# Patient Record
Sex: MALE | Race: BLACK OR AFRICAN AMERICAN | Employment: UNEMPLOYED | URBAN - METROPOLITAN AREA
[De-identification: names, ages, dates, MRNs, and addresses within clinical notes are randomized per-mention and may not be internally consistent; named-entity substitution may affect disease eponyms.]

---

## 2019-06-01 ENCOUNTER — HOSPITAL ENCOUNTER (EMERGENCY)
Age: 19
Discharge: HOME | End: 2019-06-01
Payer: MEDICAID

## 2019-06-01 VITALS
DIASTOLIC BLOOD PRESSURE: 66 MMHG | TEMPERATURE: 97.2 F | OXYGEN SATURATION: 100 % | WEIGHT: 130 LBS | HEIGHT: 70 IN | HEART RATE: 69 BPM | RESPIRATION RATE: 16 BRPM | BODY MASS INDEX: 18.61 KG/M2 | SYSTOLIC BLOOD PRESSURE: 104 MMHG

## 2019-06-01 DIAGNOSIS — R11.2 NAUSEA AND VOMITING, INTRACTABILITY OF VOMITING NOT SPECIFIED, UNSPECIFIED VOMITING TYPE: ICD-10-CM

## 2019-06-01 DIAGNOSIS — E86.0 DEHYDRATION: Primary | ICD-10-CM

## 2019-06-01 LAB
ALBUMIN SERPL-MCNC: 5.2 G/DL (ref 3.5–5.2)
ALP BLD-CCNC: 140 U/L (ref 40–129)
ALT SERPL-CCNC: 14 U/L (ref 0–40)
ANION GAP SERPL CALCULATED.3IONS-SCNC: 12 MMOL/L (ref 7–16)
AST SERPL-CCNC: 30 U/L (ref 0–39)
BACTERIA: ABNORMAL /HPF
BASOPHILS ABSOLUTE: 0.01 E9/L (ref 0–0.2)
BASOPHILS RELATIVE PERCENT: 0.1 % (ref 0–2)
BILIRUB SERPL-MCNC: 0.3 MG/DL (ref 0–1.2)
BILIRUBIN URINE: NEGATIVE
BLOOD, URINE: ABNORMAL
BUN BLDV-MCNC: 22 MG/DL (ref 6–20)
CALCIUM SERPL-MCNC: 10.1 MG/DL (ref 8.6–10.2)
CHLORIDE BLD-SCNC: 100 MMOL/L (ref 98–107)
CLARITY: CLEAR
CO2: 24 MMOL/L (ref 22–29)
COLOR: YELLOW
CREAT SERPL-MCNC: 1.2 MG/DL (ref 0.7–1.2)
EOSINOPHILS ABSOLUTE: 0 E9/L (ref 0.05–0.5)
EOSINOPHILS RELATIVE PERCENT: 0 % (ref 0–6)
GFR AFRICAN AMERICAN: >60
GFR NON-AFRICAN AMERICAN: >60 ML/MIN/1.73
GLUCOSE BLD-MCNC: 96 MG/DL (ref 74–99)
GLUCOSE URINE: NEGATIVE MG/DL
HCT VFR BLD CALC: 41.2 % (ref 37–54)
HEMOGLOBIN: 13.7 G/DL (ref 12.5–16.5)
IMMATURE GRANULOCYTES #: 0.05 E9/L
IMMATURE GRANULOCYTES %: 0.6 % (ref 0–5)
KETONES, URINE: 15 MG/DL
LACTIC ACID: 0.9 MMOL/L (ref 0.5–2.2)
LEUKOCYTE ESTERASE, URINE: NEGATIVE
LIPASE: 23 U/L (ref 13–60)
LYMPHOCYTES ABSOLUTE: 0.66 E9/L (ref 1.5–4)
LYMPHOCYTES RELATIVE PERCENT: 7.4 % (ref 20–42)
MCH RBC QN AUTO: 29.8 PG (ref 26–35)
MCHC RBC AUTO-ENTMCNC: 33.3 % (ref 32–34.5)
MCV RBC AUTO: 89.8 FL (ref 80–99.9)
MONOCYTES ABSOLUTE: 0.54 E9/L (ref 0.1–0.95)
MONOCYTES RELATIVE PERCENT: 6.1 % (ref 2–12)
NEUTROPHILS ABSOLUTE: 7.64 E9/L (ref 1.8–7.3)
NEUTROPHILS RELATIVE PERCENT: 85.8 % (ref 43–80)
NITRITE, URINE: NEGATIVE
PDW BLD-RTO: 13.3 FL (ref 11.5–15)
PH UA: 5.5 (ref 5–9)
PLATELET # BLD: 248 E9/L (ref 130–450)
PMV BLD AUTO: 10 FL (ref 7–12)
POTASSIUM SERPL-SCNC: 5.1 MMOL/L (ref 3.5–5)
PROTEIN UA: 30 MG/DL
RBC # BLD: 4.59 E12/L (ref 3.8–5.8)
RBC UA: ABNORMAL /HPF (ref 0–2)
SODIUM BLD-SCNC: 136 MMOL/L (ref 132–146)
SPECIFIC GRAVITY UA: 1.02 (ref 1–1.03)
TOTAL PROTEIN: 9.4 G/DL (ref 6.4–8.3)
UROBILINOGEN, URINE: 0.2 E.U./DL
WBC # BLD: 8.9 E9/L (ref 4.5–11.5)
WBC UA: ABNORMAL /HPF (ref 0–5)

## 2019-06-01 PROCEDURE — 6360000002 HC RX W HCPCS: Performed by: NURSE PRACTITIONER

## 2019-06-01 PROCEDURE — 2580000003 HC RX 258: Performed by: NURSE PRACTITIONER

## 2019-06-01 PROCEDURE — 96374 THER/PROPH/DIAG INJ IV PUSH: CPT

## 2019-06-01 PROCEDURE — 81001 URINALYSIS AUTO W/SCOPE: CPT

## 2019-06-01 PROCEDURE — 99283 EMERGENCY DEPT VISIT LOW MDM: CPT

## 2019-06-01 PROCEDURE — 83690 ASSAY OF LIPASE: CPT

## 2019-06-01 PROCEDURE — 83605 ASSAY OF LACTIC ACID: CPT

## 2019-06-01 PROCEDURE — 36415 COLL VENOUS BLD VENIPUNCTURE: CPT

## 2019-06-01 PROCEDURE — 85025 COMPLETE CBC W/AUTO DIFF WBC: CPT

## 2019-06-01 PROCEDURE — 80053 COMPREHEN METABOLIC PANEL: CPT

## 2019-06-01 RX ORDER — ONDANSETRON 2 MG/ML
4 INJECTION INTRAMUSCULAR; INTRAVENOUS ONCE
Status: COMPLETED | OUTPATIENT
Start: 2019-06-01 | End: 2019-06-01

## 2019-06-01 RX ORDER — ONDANSETRON 4 MG/1
4 TABLET, ORALLY DISINTEGRATING ORAL EVERY 8 HOURS PRN
Qty: 24 TABLET | Refills: 0 | Status: SHIPPED | OUTPATIENT
Start: 2019-06-01 | End: 2020-06-29

## 2019-06-01 RX ORDER — 0.9 % SODIUM CHLORIDE 0.9 %
1000 INTRAVENOUS SOLUTION INTRAVENOUS ONCE
Status: COMPLETED | OUTPATIENT
Start: 2019-06-01 | End: 2019-06-01

## 2019-06-01 RX ADMIN — ONDANSETRON HYDROCHLORIDE 4 MG: 2 SOLUTION INTRAMUSCULAR; INTRAVENOUS at 02:51

## 2019-06-01 RX ADMIN — SODIUM CHLORIDE 1000 ML: 9 INJECTION, SOLUTION INTRAVENOUS at 02:50

## 2019-06-01 SDOH — HEALTH STABILITY: MENTAL HEALTH: HOW OFTEN DO YOU HAVE A DRINK CONTAINING ALCOHOL?: NEVER

## 2019-06-01 ASSESSMENT — PAIN DESCRIPTION - ORIENTATION
ORIENTATION: MID;UPPER
ORIENTATION: MID;UPPER

## 2019-06-01 ASSESSMENT — PAIN DESCRIPTION - PROGRESSION
CLINICAL_PROGRESSION: NOT CHANGED
CLINICAL_PROGRESSION: RESOLVED

## 2019-06-01 ASSESSMENT — PAIN SCALES - GENERAL
PAINLEVEL_OUTOF10: 7
PAINLEVEL_OUTOF10: 7

## 2019-06-01 ASSESSMENT — PAIN DESCRIPTION - LOCATION
LOCATION: ABDOMEN
LOCATION: ABDOMEN

## 2019-06-01 ASSESSMENT — PAIN DESCRIPTION - PAIN TYPE
TYPE: ACUTE PAIN
TYPE: ACUTE PAIN

## 2019-06-01 ASSESSMENT — PAIN DESCRIPTION - DESCRIPTORS
DESCRIPTORS: SHARP
DESCRIPTORS: STABBING

## 2019-06-01 ASSESSMENT — PAIN DESCRIPTION - FREQUENCY
FREQUENCY: INTERMITTENT
FREQUENCY: INTERMITTENT

## 2019-06-01 ASSESSMENT — PAIN DESCRIPTION - ONSET: ONSET: ON-GOING

## 2019-06-01 NOTE — ED NOTES
Patient remains A&OX4, respirations non-labored, skin warm/dry, no distress noted. Patient discharged per order.       Johnathon Joaquin RN  06/01/19 8854

## 2019-06-04 NOTE — ED PROVIDER NOTES
[facility]   HPI:  6/4/19, Time: 12:30 AM         Nunu Henson is a 22 y.o. male presenting to the ED for nausea and vomiting after playing basketball. Patient reports having midepigastric abdominal pain as well as emesis ×5 this evening. Patient reports that he attempted to eat and drink while playing basketball but afterwards started having emesis. Denies any other family members ill with symptoms. Denies any diarrhea. Denies any back pain with urination. Review of Systems:   Pertinent positives and negatives are stated within HPI, all other systems reviewed and are negative.          --------------------------------------------- PAST HISTORY ---------------------------------------------  Past Medical History:  has no past medical history on file. Past Surgical History:  has no past surgical history on file. Social History:  reports that he has never smoked. He has never used smokeless tobacco. He reports that he has current or past drug history. Drug: Marijuana. He reports that he does not drink alcohol. Family History: family history is not on file. The patients home medications have been reviewed. Allergies: Patient has no known allergies.     -------------------------------------------------- RESULTS -------------------------------------------------  All laboratory and radiology results have been personally reviewed by myself   LABS:  Results for orders placed or performed during the hospital encounter of 06/01/19   CBC Auto Differential   Result Value Ref Range    WBC 8.9 4.5 - 11.5 E9/L    RBC 4.59 3.80 - 5.80 E12/L    Hemoglobin 13.7 12.5 - 16.5 g/dL    Hematocrit 41.2 37.0 - 54.0 %    MCV 89.8 80.0 - 99.9 fL    MCH 29.8 26.0 - 35.0 pg    MCHC 33.3 32.0 - 34.5 %    RDW 13.3 11.5 - 15.0 fL    Platelets 417 509 - 179 E9/L    MPV 10.0 7.0 - 12.0 fL    Neutrophils % 85.8 (H) 43.0 - 80.0 %    Immature Granulocytes % 0.6 0.0 - 5.0 %    Lymphocytes % 7.4 (L) 20.0 - 42.0 %    Monocytes % 6.1 2.0 - 12.0 %    Eosinophils % 0.0 0.0 - 6.0 %    Basophils % 0.1 0.0 - 2.0 %    Neutrophils # 7.64 (H) 1.80 - 7.30 E9/L    Immature Granulocytes # 0.05 E9/L    Lymphocytes # 0.66 (L) 1.50 - 4.00 E9/L    Monocytes # 0.54 0.10 - 0.95 E9/L    Eosinophils # 0.00 (L) 0.05 - 0.50 E9/L    Basophils # 0.01 0.00 - 0.20 E9/L   Comprehensive Metabolic Panel   Result Value Ref Range    Sodium 136 132 - 146 mmol/L    Potassium 5.1 (H) 3.5 - 5.0 mmol/L    Chloride 100 98 - 107 mmol/L    CO2 24 22 - 29 mmol/L    Anion Gap 12 7 - 16 mmol/L    Glucose 96 74 - 99 mg/dL    BUN 22 (H) 6 - 20 mg/dL    CREATININE 1.2 0.7 - 1.2 mg/dL    GFR Non-African American >60 >=60 mL/min/1.73    GFR African American >60     Calcium 10.1 8.6 - 10.2 mg/dL    Total Protein 9.4 (H) 6.4 - 8.3 g/dL    Alb 5.2 3.5 - 5.2 g/dL    Total Bilirubin 0.3 0.0 - 1.2 mg/dL    Alkaline Phosphatase 140 (H) 40 - 129 U/L    ALT 14 0 - 40 U/L    AST 30 0 - 39 U/L   Lactic Acid, Plasma   Result Value Ref Range    Lactic Acid 0.9 0.5 - 2.2 mmol/L   Lipase   Result Value Ref Range    Lipase 23 13 - 60 U/L   Urinalysis   Result Value Ref Range    Color, UA Yellow Straw/Yellow    Clarity, UA Clear Clear    Glucose, Ur Negative Negative mg/dL    Bilirubin Urine Negative Negative    Ketones, Urine 15 (A) Negative mg/dL    Specific Gravity, UA 1.025 1.005 - 1.030    Blood, Urine SMALL (A) Negative    pH, UA 5.5 5.0 - 9.0    Protein, UA 30 (A) Negative mg/dL    Urobilinogen, Urine 0.2 <2.0 E.U./dL    Nitrite, Urine Negative Negative    Leukocyte Esterase, Urine Negative Negative   Microscopic Urinalysis   Result Value Ref Range    WBC, UA 2-5 0 - 5 /HPF    RBC, UA 0-1 0 - 2 /HPF    Bacteria, UA FEW (A) /HPF       RADIOLOGY:  Interpreted by Radiologist.  No orders to display       ------------------------- NURSING NOTES AND VITALS REVIEWED ---------------------------   The nursing notes within the ED encounter and vital signs as below have been reviewed.    /66   Pulse 69 Temp 97.2 °F (36.2 °C) (Infrared)   Resp 16   Ht 5' 10\" (1.778 m)   Wt 130 lb (59 kg)   SpO2 100%   BMI 18.65 kg/m²   Oxygen Saturation Interpretation: Normal      ---------------------------------------------------PHYSICAL EXAM--------------------------------------      Constitutional/General: Alert and oriented x3, well appearing, non toxic in NAD  Head: Normocephalic and atraumatic  Eyes: PERRL, EOMI  Mouth: Oropharynx clear, handling secretions, no trismus  Neck: Supple, full ROM,   Pulmonary: Lungs clear to auscultation bilaterally, no wheezes, rales, or rhonchi. Not in respiratory distress  Cardiovascular:  Regular rate and rhythm, no murmurs, gallops, or rubs. 2+ distal pulses  Abdomen: Soft, non tender, non distended,   Extremities: Moves all extremities x 4. Warm and well perfused  Skin: warm and dry without rash  Neurologic: GCS 15,  Psych: Normal Affect      ------------------------------ ED COURSE/MEDICAL DECISION MAKING----------------------  Medications   0.9 % sodium chloride bolus (0 mLs Intravenous Stopped 6/1/19 3723)   ondansetron (ZOFRAN) injection 4 mg (4 mg Intravenous Given 6/1/19 2911)         ED COURSE:       Medical Decision Making: Plan will be for labs. Will also medicate with 1 L normal saline and IV Zofran. Patient neurovascularly intact. Labs resulted in are unremarkable. Patient upon reevaluation after 1 L normal saline. Patient reports feeling markedly improved. Plan will be further discharge home with prescription for Zofran he was educated on slowly advancing his diet and to start with clear liquids. Patient neurovascularly intact. Patient expressed understanding and safely discharged home       Counseling: The emergency provider has spoken with the patient and discussed todays results, in addition to providing specific details for the plan of care and counseling regarding the diagnosis and prognosis.   Questions are answered at this time and they are agreeable with the plan.      --------------------------------- IMPRESSION AND DISPOSITION ---------------------------------    IMPRESSION  1. Nausea and vomiting, intractability of vomiting not specified, unspecified vomiting type    2. Dehydration        DISPOSITION  Disposition: Discharge to home  Patient condition is good      NOTE: This report was transcribed using voice recognition software.  Every effort was made to ensure accuracy; however, inadvertent computerized transcription errors may be present     Perri Taveras, DARWIN - [facility]  06/04/19 3567 BERTA Lorenz, DARWIN - CNP  06/04/19 1225

## 2019-10-23 ENCOUNTER — HOSPITAL ENCOUNTER (EMERGENCY)
Age: 19
Discharge: HOME | End: 2019-10-23
Payer: MEDICAID

## 2019-10-23 ENCOUNTER — APPOINTMENT (OUTPATIENT)
Dept: GENERAL RADIOLOGY | Age: 19
End: 2019-10-23
Payer: MEDICAID

## 2019-10-23 VITALS
SYSTOLIC BLOOD PRESSURE: 117 MMHG | WEIGHT: 165 LBS | BODY MASS INDEX: 23.1 KG/M2 | RESPIRATION RATE: 16 BRPM | HEIGHT: 71 IN | TEMPERATURE: 98.3 F | DIASTOLIC BLOOD PRESSURE: 77 MMHG | HEART RATE: 70 BPM | OXYGEN SATURATION: 99 %

## 2019-10-23 DIAGNOSIS — J06.9 VIRAL URI WITH COUGH: Primary | ICD-10-CM

## 2019-10-23 PROCEDURE — 99283 EMERGENCY DEPT VISIT LOW MDM: CPT

## 2019-10-23 PROCEDURE — 71046 X-RAY EXAM CHEST 2 VIEWS: CPT

## 2019-10-23 RX ORDER — GUAIFENESIN/DEXTROMETHORPHAN 100-10MG/5
5 SYRUP ORAL 3 TIMES DAILY PRN
Qty: 120 ML | Refills: 0 | Status: SHIPPED | OUTPATIENT
Start: 2019-10-23 | End: 2019-11-02

## 2019-10-23 RX ORDER — METHYLPREDNISOLONE 4 MG/1
TABLET ORAL
Qty: 1 KIT | Refills: 0 | Status: SHIPPED | OUTPATIENT
Start: 2019-10-23 | End: 2019-10-29

## 2019-10-23 ASSESSMENT — PAIN DESCRIPTION - PAIN TYPE: TYPE: ACUTE PAIN

## 2019-10-23 ASSESSMENT — PAIN DESCRIPTION - LOCATION: LOCATION: CHEST

## 2019-10-23 ASSESSMENT — PAIN SCALES - GENERAL: PAINLEVEL_OUTOF10: 3

## 2019-10-23 ASSESSMENT — PAIN DESCRIPTION - DESCRIPTORS: DESCRIPTORS: BURNING

## 2019-10-23 ASSESSMENT — PAIN SCALES - WONG BAKER: WONGBAKER_NUMERICALRESPONSE: 2

## 2020-06-29 ENCOUNTER — HOSPITAL ENCOUNTER (EMERGENCY)
Age: 20
Discharge: HOME | End: 2020-06-29
Payer: MEDICAID

## 2020-06-29 ENCOUNTER — APPOINTMENT (OUTPATIENT)
Dept: CT IMAGING | Age: 20
End: 2020-06-29
Payer: MEDICAID

## 2020-06-29 VITALS
HEIGHT: 71 IN | TEMPERATURE: 97.9 F | RESPIRATION RATE: 14 BRPM | SYSTOLIC BLOOD PRESSURE: 109 MMHG | BODY MASS INDEX: 18.2 KG/M2 | HEART RATE: 68 BPM | OXYGEN SATURATION: 100 % | WEIGHT: 130 LBS | DIASTOLIC BLOOD PRESSURE: 78 MMHG

## 2020-06-29 LAB
ALBUMIN SERPL-MCNC: 4.6 G/DL (ref 3.5–5.2)
ALP BLD-CCNC: 73 U/L (ref 40–129)
ALT SERPL-CCNC: 10 U/L (ref 0–40)
ANION GAP SERPL CALCULATED.3IONS-SCNC: 10 MMOL/L (ref 7–16)
AST SERPL-CCNC: 18 U/L (ref 0–39)
BASOPHILS ABSOLUTE: 0.01 E9/L (ref 0–0.2)
BASOPHILS RELATIVE PERCENT: 0.5 % (ref 0–2)
BILIRUB SERPL-MCNC: 0.4 MG/DL (ref 0–1.2)
BILIRUBIN URINE: NEGATIVE
BLOOD, URINE: NEGATIVE
BUN BLDV-MCNC: 9 MG/DL (ref 6–20)
CALCIUM SERPL-MCNC: 9.6 MG/DL (ref 8.6–10.2)
CHLORIDE BLD-SCNC: 104 MMOL/L (ref 98–107)
CLARITY: CLEAR
CO2: 25 MMOL/L (ref 22–29)
COLOR: YELLOW
CREAT SERPL-MCNC: 0.8 MG/DL (ref 0.7–1.2)
EOSINOPHILS ABSOLUTE: 0.05 E9/L (ref 0.05–0.5)
EOSINOPHILS RELATIVE PERCENT: 2.3 % (ref 0–6)
GFR AFRICAN AMERICAN: >60
GFR NON-AFRICAN AMERICAN: >60 ML/MIN/1.73
GLUCOSE BLD-MCNC: 108 MG/DL (ref 74–99)
GLUCOSE URINE: NEGATIVE MG/DL
HCT VFR BLD CALC: 38.8 % (ref 37–54)
HEMOGLOBIN: 12.9 G/DL (ref 12.5–16.5)
IMMATURE GRANULOCYTES #: 0.03 E9/L
IMMATURE GRANULOCYTES %: 1.4 % (ref 0–5)
KETONES, URINE: NEGATIVE MG/DL
LACTIC ACID: 0.6 MMOL/L (ref 0.5–2.2)
LEUKOCYTE ESTERASE, URINE: NEGATIVE
LIPASE: 36 U/L (ref 13–60)
LYMPHOCYTES ABSOLUTE: 0.73 E9/L (ref 1.5–4)
LYMPHOCYTES RELATIVE PERCENT: 33.6 % (ref 20–42)
MCH RBC QN AUTO: 30.9 PG (ref 26–35)
MCHC RBC AUTO-ENTMCNC: 33.2 % (ref 32–34.5)
MCV RBC AUTO: 92.8 FL (ref 80–99.9)
MONOCYTES ABSOLUTE: 0.17 E9/L (ref 0.1–0.95)
MONOCYTES RELATIVE PERCENT: 7.8 % (ref 2–12)
NEUTROPHILS ABSOLUTE: 1.18 E9/L (ref 1.8–7.3)
NEUTROPHILS RELATIVE PERCENT: 54.4 % (ref 43–80)
NITRITE, URINE: NEGATIVE
PDW BLD-RTO: 14.1 FL (ref 11.5–15)
PH UA: 6 (ref 5–9)
PLATELET # BLD: 163 E9/L (ref 130–450)
PMV BLD AUTO: 10 FL (ref 7–12)
POTASSIUM REFLEX MAGNESIUM: 4.1 MMOL/L (ref 3.5–5)
PROTEIN UA: NEGATIVE MG/DL
RBC # BLD: 4.18 E12/L (ref 3.8–5.8)
SODIUM BLD-SCNC: 139 MMOL/L (ref 132–146)
SPECIFIC GRAVITY UA: 1.01 (ref 1–1.03)
TOTAL PROTEIN: 7.5 G/DL (ref 6.4–8.3)
UROBILINOGEN, URINE: 1 E.U./DL
WBC # BLD: 2.2 E9/L (ref 4.5–11.5)

## 2020-06-29 PROCEDURE — 83605 ASSAY OF LACTIC ACID: CPT

## 2020-06-29 PROCEDURE — 80053 COMPREHEN METABOLIC PANEL: CPT

## 2020-06-29 PROCEDURE — 96374 THER/PROPH/DIAG INJ IV PUSH: CPT

## 2020-06-29 PROCEDURE — 99284 EMERGENCY DEPT VISIT MOD MDM: CPT

## 2020-06-29 PROCEDURE — 6360000002 HC RX W HCPCS: Performed by: NURSE PRACTITIONER

## 2020-06-29 PROCEDURE — 85025 COMPLETE CBC W/AUTO DIFF WBC: CPT

## 2020-06-29 PROCEDURE — 2580000003 HC RX 258: Performed by: NURSE PRACTITIONER

## 2020-06-29 PROCEDURE — 74177 CT ABD & PELVIS W/CONTRAST: CPT

## 2020-06-29 PROCEDURE — 83690 ASSAY OF LIPASE: CPT

## 2020-06-29 PROCEDURE — 6360000004 HC RX CONTRAST MEDICATION: Performed by: RADIOLOGY

## 2020-06-29 PROCEDURE — 81003 URINALYSIS AUTO W/O SCOPE: CPT

## 2020-06-29 PROCEDURE — 96375 TX/PRO/DX INJ NEW DRUG ADDON: CPT

## 2020-06-29 RX ORDER — CYANOCOBALAMIN (VITAMIN B-12) 500 MCG
500 TABLET ORAL DAILY
COMMUNITY
End: 2021-06-01

## 2020-06-29 RX ORDER — DOCUSATE SODIUM 100 MG/1
100 CAPSULE, LIQUID FILLED ORAL 2 TIMES DAILY
Qty: 14 CAPSULE | Refills: 0 | Status: SHIPPED | OUTPATIENT
Start: 2020-06-29 | End: 2020-07-06

## 2020-06-29 RX ORDER — KETOROLAC TROMETHAMINE 30 MG/ML
15 INJECTION, SOLUTION INTRAMUSCULAR; INTRAVENOUS ONCE
Status: COMPLETED | OUTPATIENT
Start: 2020-06-29 | End: 2020-06-29

## 2020-06-29 RX ORDER — 0.9 % SODIUM CHLORIDE 0.9 %
1000 INTRAVENOUS SOLUTION INTRAVENOUS ONCE
Status: COMPLETED | OUTPATIENT
Start: 2020-06-29 | End: 2020-06-29

## 2020-06-29 RX ORDER — POLYETHYLENE GLYCOL 3350 17 G/17G
17 POWDER, FOR SOLUTION ORAL DAILY
Qty: 510 G | Refills: 0 | Status: SHIPPED | OUTPATIENT
Start: 2020-06-29 | End: 2020-07-29

## 2020-06-29 RX ORDER — ONDANSETRON 2 MG/ML
4 INJECTION INTRAMUSCULAR; INTRAVENOUS ONCE
Status: COMPLETED | OUTPATIENT
Start: 2020-06-29 | End: 2020-06-29

## 2020-06-29 RX ADMIN — KETOROLAC TROMETHAMINE 15 MG: 30 INJECTION, SOLUTION INTRAMUSCULAR at 10:29

## 2020-06-29 RX ADMIN — SODIUM CHLORIDE 1000 ML: 9 INJECTION, SOLUTION INTRAVENOUS at 10:27

## 2020-06-29 RX ADMIN — IOPAMIDOL 110 ML: 755 INJECTION, SOLUTION INTRAVENOUS at 11:59

## 2020-06-29 RX ADMIN — ONDANSETRON 4 MG: 2 INJECTION INTRAMUSCULAR; INTRAVENOUS at 10:27

## 2020-06-29 ASSESSMENT — PAIN SCALES - GENERAL
PAINLEVEL_OUTOF10: 5
PAINLEVEL_OUTOF10: 0

## 2020-06-29 ASSESSMENT — PAIN DESCRIPTION - ORIENTATION: ORIENTATION: UPPER

## 2020-06-29 ASSESSMENT — PAIN DESCRIPTION - LOCATION: LOCATION: ABDOMEN

## 2020-06-29 ASSESSMENT — PAIN DESCRIPTION - PAIN TYPE: TYPE: ACUTE PAIN

## 2020-06-29 ASSESSMENT — PAIN DESCRIPTION - DESCRIPTORS: DESCRIPTORS: SHARP

## 2020-06-29 NOTE — ED PROVIDER NOTES
[facility]       Department of Emergency Medicine   ED  Provider Note  Admit Date/RoomTime: 6/29/2020  9:26 AM  ED Room: 14/14B-14  Chief Complaint     Abdominal Pain (pt woke up with mid epigastric abdominal pain)    History of Present Illness   Source of history provided by:  patient. History/Exam Limitations: none. Hallie Manriquez is a 20 y.o. old male who has a past medical history of: History reviewed. No pertinent past medical history. presents to the emergency department for complaint of abdominal pain that started when he woke up this morning. There has been no similar episodes in the past.  Since onset the symptoms have been persistent. The pain is associated with nothing pertinent. The pain is aggravated by none and relieved by nothing. There has been NO back pain, chills, cloudy urine, diarrhea, dysuria, headache, hematuria, penile discharge, sweating, urinary frequency, urinary incontinence, urinary urgency or vomiting. Denies fever or chills. No scrotal pain or swelling. Reports that his last bowel movement was 3 days ago. Reports his normal bowel pattern is every other day. Denies melena or hematochezia. admits to the use of alcohol on the weekends only. Denies illicit drug use. .  ROS   Pertinent positives and negatives are stated within HPI, all other systems reviewed and are negative. History reviewed. No pertinent surgical history. Social History:  reports that he has never smoked. He has never used smokeless tobacco. He reports current drug use. Drug: Marijuana. He reports that he does not drink alcohol. Family History: family history is not on file. Allergies: Patient has no known allergies.     Physical Exam           ED Triage Vitals   BP Temp Temp src Pulse Resp SpO2 Height Weight   06/29/20 0924 06/29/20 0923 -- 06/29/20 0913 06/29/20 0923 06/29/20 0913 06/29/20 0923 06/29/20 0923   136/60 97.9 °F (36.6 °C)  71 18 97 % 5' 11\" (1.803 m) 130 lb (59 kg)      Oxygen Saturation Interpretation: Normal.    · General Appearance/Constitutional:  Alert, development consistent with age. · HEENT:  NC/NT. PERRLA. Airway patent. · Neck:  Supple. No lymphadenopathy. · Respiratory: Lungs Clear to auscultation and breath sounds equal.  · CV:  Regular rate and rhythm. · GI:  General Appearance: normal.         Bowel sounds: hypoactive bowel sounds. Distension:  None. Tenderness: mild tenderness is present in the periumbilical area. Liver: non-tender. Spleen:  non-tender. Pulsatile Mass: absent. Hernia:  no inguinal or femoral hernias noted. · Back: CVA Tenderness: No.  · Integument:  Normal turgor. Warm, dry, without visible rash, unless noted elsewhere. · Neurological:  Orientation age-appropriate. Motor functions intact.     Lab / Imaging Results   (All laboratory and radiology results have been personally reviewed by myself)  Labs:  Results for orders placed or performed during the hospital encounter of 06/29/20   CBC Auto Differential   Result Value Ref Range    WBC 2.2 (L) 4.5 - 11.5 E9/L    RBC 4.18 3.80 - 5.80 E12/L    Hemoglobin 12.9 12.5 - 16.5 g/dL    Hematocrit 38.8 37.0 - 54.0 %    MCV 92.8 80.0 - 99.9 fL    MCH 30.9 26.0 - 35.0 pg    MCHC 33.2 32.0 - 34.5 %    RDW 14.1 11.5 - 15.0 fL    Platelets 901 564 - 635 E9/L    MPV 10.0 7.0 - 12.0 fL    Neutrophils % 54.4 43.0 - 80.0 %    Immature Granulocytes % 1.4 0.0 - 5.0 %    Lymphocytes % 33.6 20.0 - 42.0 %    Monocytes % 7.8 2.0 - 12.0 %    Eosinophils % 2.3 0.0 - 6.0 %    Basophils % 0.5 0.0 - 2.0 %    Neutrophils Absolute 1.18 (L) 1.80 - 7.30 E9/L    Immature Granulocytes # 0.03 E9/L    Lymphocytes Absolute 0.73 (L) 1.50 - 4.00 E9/L    Monocytes Absolute 0.17 0.10 - 0.95 E9/L    Eosinophils Absolute 0.05 0.05 - 0.50 E9/L    Basophils Absolute 0.01 0.00 - 0.20 E9/L   Comprehensive Metabolic Panel w/ Reflex to MG   Result Value Ref Range    Sodium 139 132 - 146 mmol/L    Potassium reflex Magnesium 4.1 3.5 - 5.0 mmol/L    Chloride 104 98 - 107 mmol/L    CO2 25 22 - 29 mmol/L    Anion Gap 10 7 - 16 mmol/L    Glucose 108 (H) 74 - 99 mg/dL    BUN 9 6 - 20 mg/dL    CREATININE 0.8 0.7 - 1.2 mg/dL    GFR Non-African American >60 >=60 mL/min/1.73    GFR African American >60     Calcium 9.6 8.6 - 10.2 mg/dL    Total Protein 7.5 6.4 - 8.3 g/dL    Alb 4.6 3.5 - 5.2 g/dL    Total Bilirubin 0.4 0.0 - 1.2 mg/dL    Alkaline Phosphatase 73 40 - 129 U/L    ALT 10 0 - 40 U/L    AST 18 0 - 39 U/L   Lactic Acid, Plasma   Result Value Ref Range    Lactic Acid 0.6 0.5 - 2.2 mmol/L   Lipase   Result Value Ref Range    Lipase 36 13 - 60 U/L   Urinalysis, reflex to microscopic   Result Value Ref Range    Color, UA Yellow Straw/Yellow    Clarity, UA Clear Clear    Glucose, Ur Negative Negative mg/dL    Bilirubin Urine Negative Negative    Ketones, Urine Negative Negative mg/dL    Specific Gravity, UA 1.015 1.005 - 1.030    Blood, Urine Negative Negative    pH, UA 6.0 5.0 - 9.0    Protein, UA Negative Negative mg/dL    Urobilinogen, Urine 1.0 <2.0 E.U./dL    Nitrite, Urine Negative Negative    Leukocyte Esterase, Urine Negative Negative     Imaging: All Radiology results interpreted by Radiologist unless otherwise noted. CT ABDOMEN PELVIS W IV CONTRAST Additional Contrast? None   Final Result   Mild cardiomegaly. ED Course / Medical Decision Making     Medications   0.9 % sodium chloride bolus (0 mLs Intravenous Stopped 6/29/20 1130)   ondansetron (ZOFRAN) injection 4 mg (4 mg Intravenous Given 6/29/20 1027)   ketorolac (TORADOL) injection 15 mg (15 mg Intravenous Given 6/29/20 1029)   iopamidol (ISOVUE-370) 76 % injection 110 mL (110 mLs Intravenous Given 6/29/20 1159)        Re-Evaluations:  6/29/20       Patients symptoms are improving.     Consultations:             None    Procedures:   none    MDM:  Willem Castro is a 79-year-old male who presented to the emergency department for complaint of abdominal pain that started this morning when he awakened. Pain is localized in the periumbilical region. No nausea or vomiting. No fever or chills. No diarrhea. he reported no BM x 3 days. CBC: wbc 2.2, H&H 12.9/38. 8. MP unremarkable. Lactic acid 0.6. Lipase 36. UA negative for any concerns for UTI. CT abdomen showed mild cardiomegaly, no acute abdominal findings. He had no complaints of chest pain or shortness of breath. Specifically the appendix was found to be normal. The gallbladder and bile ducts were unremarkable. No evidence of free air or bowel obstruction. Prescribed Colace and MiraLAX for complaint of constipation. Results of his CT, which included the incidental finding of mild cardiomegaly was discussed with him in addition to his low white blood cell count. He was instructed to follow up with his family doctor for repeat blood work and possible need for further evaluation. He verbalized understanding and agrees with plan. He was given IV fluids and medicated for pain with positive results. He remained hemodynamically stable, non-toxic, and appropriate for outpatient management. He was advised to return for any new, changing, worsening symptoms or concerns. At this time the patient is without objective evidence of an acute process requiring hospitalization or inpatient management. They have remained hemodynamically stable throughout their entire ED visit and are stable for discharge with outpatient follow-up. The plan has been discussed in detail and they are aware of the specific conditions for emergent return, as well as the importance of follow-up. Counseling:   I have spoken with the patient and discussed todays results, in addition to providing specific details for the plan of care and counseling regarding the diagnosis and prognosis and are agreeable with the plan. Assessment      1. Abdominal pain, unspecified abdominal location    2. Leukopenia, unspecified type    3. Constipation, unspecified constipation type      This patient's ED course included: a personal history and physicial examination and re-evaluation prior to disposition  This patient has remained hemodynamically stable and improved during their ED course. Plan   Discharge to home. Patient condition is good. New Medications     Discharge Medication List as of 6/29/2020  2:04 PM      START taking these medications    Details   docusate sodium (COLACE) 100 MG capsule Take 1 capsule by mouth 2 times daily for 7 days, Disp-14 capsule, R-0Print      polyethylene glycol (MIRALAX) 17 GM/SCOOP powder Take 17 g by mouth daily, Disp-510 g, R-0Print           Electronically signed by DARWIN Christianson CNP   DD: 6/29/20  **This report was transcribed using voice recognition software. Every effort was made to ensure accuracy; however, inadvertent computerized transcription errors may be present.   END OF PROVIDER NOTE      DARWIN Christianson CNP  06/29/20 2013

## 2020-08-05 ENCOUNTER — APPOINTMENT (OUTPATIENT)
Dept: GENERAL RADIOLOGY | Age: 20
End: 2020-08-05
Payer: MEDICAID

## 2020-08-05 ENCOUNTER — HOSPITAL ENCOUNTER (EMERGENCY)
Age: 20
Discharge: HOME | End: 2020-08-05
Attending: EMERGENCY MEDICINE
Payer: MEDICAID

## 2020-08-05 VITALS
WEIGHT: 146 LBS | TEMPERATURE: 97.3 F | SYSTOLIC BLOOD PRESSURE: 116 MMHG | OXYGEN SATURATION: 99 % | DIASTOLIC BLOOD PRESSURE: 78 MMHG | HEIGHT: 71 IN | BODY MASS INDEX: 20.44 KG/M2 | HEART RATE: 68 BPM | RESPIRATION RATE: 16 BRPM

## 2020-08-05 LAB
ALBUMIN SERPL-MCNC: 4.6 G/DL (ref 3.5–5.2)
ALP BLD-CCNC: 71 U/L (ref 40–129)
ALT SERPL-CCNC: 11 U/L (ref 0–40)
ANION GAP SERPL CALCULATED.3IONS-SCNC: 14 MMOL/L (ref 7–16)
AST SERPL-CCNC: 24 U/L (ref 0–39)
BACTERIA: ABNORMAL /HPF
BASOPHILS ABSOLUTE: 0.01 E9/L (ref 0–0.2)
BASOPHILS RELATIVE PERCENT: 0.4 % (ref 0–2)
BILIRUB SERPL-MCNC: 0.4 MG/DL (ref 0–1.2)
BILIRUBIN URINE: NEGATIVE
BLOOD, URINE: ABNORMAL
BUN BLDV-MCNC: 10 MG/DL (ref 6–20)
CALCIUM SERPL-MCNC: 10 MG/DL (ref 8.6–10.2)
CHLORIDE BLD-SCNC: 100 MMOL/L (ref 98–107)
CLARITY: CLEAR
CO2: 22 MMOL/L (ref 22–29)
COLOR: YELLOW
CREAT SERPL-MCNC: 0.8 MG/DL (ref 0.7–1.2)
EKG ATRIAL RATE: 59 BPM
EKG P AXIS: 5 DEGREES
EKG P-R INTERVAL: 200 MS
EKG Q-T INTERVAL: 438 MS
EKG QRS DURATION: 124 MS
EKG QTC CALCULATION (BAZETT): 433 MS
EKG R AXIS: 97 DEGREES
EKG T AXIS: 61 DEGREES
EKG VENTRICULAR RATE: 59 BPM
EOSINOPHILS ABSOLUTE: 0.08 E9/L (ref 0.05–0.5)
EOSINOPHILS RELATIVE PERCENT: 3.4 % (ref 0–6)
EPITHELIAL CELLS, UA: ABNORMAL /HPF
GFR AFRICAN AMERICAN: >60
GFR NON-AFRICAN AMERICAN: >60 ML/MIN/1.73
GLUCOSE BLD-MCNC: 81 MG/DL (ref 74–99)
GLUCOSE URINE: NEGATIVE MG/DL
HCT VFR BLD CALC: 40.4 % (ref 37–54)
HEMOGLOBIN: 13.6 G/DL (ref 12.5–16.5)
IMMATURE GRANULOCYTES #: 0.01 E9/L
IMMATURE GRANULOCYTES %: 0.4 % (ref 0–5)
KETONES, URINE: 15 MG/DL
LACTIC ACID: 0.8 MMOL/L (ref 0.5–2.2)
LEUKOCYTE ESTERASE, URINE: NEGATIVE
LIPASE: 32 U/L (ref 13–60)
LYMPHOCYTES ABSOLUTE: 0.78 E9/L (ref 1.5–4)
LYMPHOCYTES RELATIVE PERCENT: 33.2 % (ref 20–42)
MCH RBC QN AUTO: 31.4 PG (ref 26–35)
MCHC RBC AUTO-ENTMCNC: 33.7 % (ref 32–34.5)
MCV RBC AUTO: 93.3 FL (ref 80–99.9)
MONOCYTES ABSOLUTE: 0.23 E9/L (ref 0.1–0.95)
MONOCYTES RELATIVE PERCENT: 9.8 % (ref 2–12)
NEUTROPHILS ABSOLUTE: 1.24 E9/L (ref 1.8–7.3)
NEUTROPHILS RELATIVE PERCENT: 52.8 % (ref 43–80)
NITRITE, URINE: NEGATIVE
PDW BLD-RTO: 13.6 FL (ref 11.5–15)
PH UA: 6 (ref 5–9)
PLATELET # BLD: 148 E9/L (ref 130–450)
PMV BLD AUTO: 9.9 FL (ref 7–12)
POTASSIUM SERPL-SCNC: 4.9 MMOL/L (ref 3.5–5)
PROTEIN UA: NEGATIVE MG/DL
RBC # BLD: 4.33 E12/L (ref 3.8–5.8)
RBC UA: ABNORMAL /HPF (ref 0–2)
SODIUM BLD-SCNC: 136 MMOL/L (ref 132–146)
SPECIFIC GRAVITY UA: 1.02 (ref 1–1.03)
TOTAL PROTEIN: 7.5 G/DL (ref 6.4–8.3)
TROPONIN: <0.01 NG/ML (ref 0–0.03)
UROBILINOGEN, URINE: 0.2 E.U./DL
WBC # BLD: 2.4 E9/L (ref 4.5–11.5)
WBC UA: ABNORMAL /HPF (ref 0–5)

## 2020-08-05 PROCEDURE — 99284 EMERGENCY DEPT VISIT MOD MDM: CPT

## 2020-08-05 PROCEDURE — 84484 ASSAY OF TROPONIN QUANT: CPT

## 2020-08-05 PROCEDURE — 6370000000 HC RX 637 (ALT 250 FOR IP): Performed by: EMERGENCY MEDICINE

## 2020-08-05 PROCEDURE — 96372 THER/PROPH/DIAG INJ SC/IM: CPT

## 2020-08-05 PROCEDURE — 83605 ASSAY OF LACTIC ACID: CPT

## 2020-08-05 PROCEDURE — 93005 ELECTROCARDIOGRAM TRACING: CPT | Performed by: EMERGENCY MEDICINE

## 2020-08-05 PROCEDURE — 81001 URINALYSIS AUTO W/SCOPE: CPT

## 2020-08-05 PROCEDURE — 96374 THER/PROPH/DIAG INJ IV PUSH: CPT

## 2020-08-05 PROCEDURE — 6360000002 HC RX W HCPCS: Performed by: EMERGENCY MEDICINE

## 2020-08-05 PROCEDURE — 83690 ASSAY OF LIPASE: CPT

## 2020-08-05 PROCEDURE — 2580000003 HC RX 258: Performed by: EMERGENCY MEDICINE

## 2020-08-05 PROCEDURE — 74022 RADEX COMPL AQT ABD SERIES: CPT

## 2020-08-05 PROCEDURE — C9113 INJ PANTOPRAZOLE SODIUM, VIA: HCPCS | Performed by: EMERGENCY MEDICINE

## 2020-08-05 PROCEDURE — 80053 COMPREHEN METABOLIC PANEL: CPT

## 2020-08-05 PROCEDURE — 85025 COMPLETE CBC W/AUTO DIFF WBC: CPT

## 2020-08-05 PROCEDURE — 36415 COLL VENOUS BLD VENIPUNCTURE: CPT

## 2020-08-05 RX ORDER — PANTOPRAZOLE SODIUM 40 MG/1
40 TABLET, DELAYED RELEASE ORAL DAILY
Qty: 30 TABLET | Refills: 0 | Status: SHIPPED | OUTPATIENT
Start: 2020-08-05 | End: 2021-06-01

## 2020-08-05 RX ORDER — PANTOPRAZOLE SODIUM 40 MG/10ML
40 INJECTION, POWDER, LYOPHILIZED, FOR SOLUTION INTRAVENOUS ONCE
Status: COMPLETED | OUTPATIENT
Start: 2020-08-05 | End: 2020-08-05

## 2020-08-05 RX ORDER — SODIUM CHLORIDE 9 MG/ML
INJECTION, SOLUTION INTRAVENOUS CONTINUOUS
Status: DISCONTINUED | OUTPATIENT
Start: 2020-08-05 | End: 2020-08-05 | Stop reason: HOSPADM

## 2020-08-05 RX ADMIN — PANTOPRAZOLE SODIUM 40 MG: 40 INJECTION, POWDER, FOR SOLUTION INTRAVENOUS at 05:46

## 2020-08-05 RX ADMIN — LIDOCAINE HYDROCHLORIDE: 20 SOLUTION ORAL; TOPICAL at 06:15

## 2020-08-05 ASSESSMENT — PAIN DESCRIPTION - PROGRESSION
CLINICAL_PROGRESSION: RESOLVED
CLINICAL_PROGRESSION: RESOLVED

## 2020-08-05 ASSESSMENT — PAIN DESCRIPTION - DESCRIPTORS: DESCRIPTORS: DISCOMFORT

## 2020-08-05 ASSESSMENT — PAIN DESCRIPTION - PAIN TYPE: TYPE: ACUTE PAIN

## 2020-08-05 ASSESSMENT — PAIN DESCRIPTION - ORIENTATION: ORIENTATION: MID

## 2020-08-05 ASSESSMENT — PAIN DESCRIPTION - LOCATION: LOCATION: ABDOMEN

## 2020-08-05 NOTE — ED PROVIDER NOTES
HPI:  8/5/20, Time: 4:50 AM EDT         Corey Briseno is a 20 y.o. male presenting to the ED for abdominal pain, beginning hours ago. The complaint has been persistent, moderate in severity, and worsened by nothing. Patient reporting epigastric pain that started a short time prior to arrival.  Patient reporting no vomiting or diarrhea. He reports no black or tarry stools. He reports the pain is causing him to be short of breath. Patient reporting no leg pain or swelling there is no cough. There is no fever or chills. Patient reporting no weakness or dizziness. He reports no neck or back pain. ROS:   Pertinent positives and negatives are stated within HPI, all other systems reviewed and are negative.  --------------------------------------------- PAST HISTORY ---------------------------------------------  Past Medical History:  has no past medical history on file. Past Surgical History:  has no past surgical history on file. Social History:  reports that he has been smoking cigarettes. He has been smoking about 0.25 packs per day. He has never used smokeless tobacco. He reports current drug use. Drug: Marijuana. He reports that he does not drink alcohol. Family History: family history is not on file. The patients home medications have been reviewed. Allergies: Patient has no known allergies. ---------------------------------------------------PHYSICAL EXAM--------------------------------------    Constitutional/General: Alert and oriented x3, mild distress  Head: Normocephalic and atraumatic  Eyes: PERRL, EOMI  Mouth: Oropharynx clear, handling secretions, no trismus  Neck: Supple, full ROM, non tender to palpation in the midline, no stridor, no crepitus, no meningeal signs  Pulmonary: Lungs clear to auscultation bilaterally, no wheezes, rales, or rhonchi. Not in respiratory distress  Cardiovascular:  Regular rate. Regular rhythm. No murmurs, gallops, or rubs.  2+ distal pulses  Chest: no chest wall tenderness  Abdomen: Soft. Tender mid upper abdomen non distended. +BS. No rebound, guarding, or rigidity. No pulsatile masses appreciated. Musculoskeletal: Moves all extremities x 4. Warm and well perfused, no clubbing, cyanosis, or edema. Capillary refill <3 seconds  Skin: warm and dry. No rashes. Neurologic: GCS 15, CN 2-12 grossly intact, no focal deficits, symmetric strength 5/5 in the upper and lower extremities bilaterally  Psych: Normal Affect    -------------------------------------------------- RESULTS -------------------------------------------------  I have personally reviewed all laboratory and imaging results for this patient. Results are listed below.      LABS:  Results for orders placed or performed during the hospital encounter of 08/05/20   CBC auto differential   Result Value Ref Range    WBC 2.4 (L) 4.5 - 11.5 E9/L    RBC 4.33 3.80 - 5.80 E12/L    Hemoglobin 13.6 12.5 - 16.5 g/dL    Hematocrit 40.4 37.0 - 54.0 %    MCV 93.3 80.0 - 99.9 fL    MCH 31.4 26.0 - 35.0 pg    MCHC 33.7 32.0 - 34.5 %    RDW 13.6 11.5 - 15.0 fL    Platelets 809 373 - 780 E9/L    MPV 9.9 7.0 - 12.0 fL    Neutrophils % 52.8 43.0 - 80.0 %    Immature Granulocytes % 0.4 0.0 - 5.0 %    Lymphocytes % 33.2 20.0 - 42.0 %    Monocytes % 9.8 2.0 - 12.0 %    Eosinophils % 3.4 0.0 - 6.0 %    Basophils % 0.4 0.0 - 2.0 %    Neutrophils Absolute 1.24 (L) 1.80 - 7.30 E9/L    Immature Granulocytes # 0.01 E9/L    Lymphocytes Absolute 0.78 (L) 1.50 - 4.00 E9/L    Monocytes Absolute 0.23 0.10 - 0.95 E9/L    Eosinophils Absolute 0.08 0.05 - 0.50 E9/L    Basophils Absolute 0.01 0.00 - 0.20 E9/L   Comprehensive Metabolic Panel   Result Value Ref Range    Sodium 136 132 - 146 mmol/L    Potassium 4.9 3.5 - 5.0 mmol/L    Chloride 100 98 - 107 mmol/L    CO2 22 22 - 29 mmol/L    Anion Gap 14 7 - 16 mmol/L    Glucose 81 74 - 99 mg/dL    BUN 10 6 - 20 mg/dL    CREATININE 0.8 0.7 - 1.2 mg/dL    GFR Non-African American >60 >=60 mL/min/1.73    GFR African American >60     Calcium 10.0 8.6 - 10.2 mg/dL    Total Protein 7.5 6.4 - 8.3 g/dL    Alb 4.6 3.5 - 5.2 g/dL    Total Bilirubin 0.4 0.0 - 1.2 mg/dL    Alkaline Phosphatase 71 40 - 129 U/L    ALT 11 0 - 40 U/L    AST 24 0 - 39 U/L   Troponin   Result Value Ref Range    Troponin <0.01 0.00 - 0.03 ng/mL   Lactic Acid, Plasma   Result Value Ref Range    Lactic Acid 0.8 0.5 - 2.2 mmol/L   Lipase   Result Value Ref Range    Lipase 32 13 - 60 U/L       RADIOLOGY:  Interpreted by Radiologist.  XR ACUTE ABD SERIES CHEST 1 VW   Final Result   No significant abnormal findings. EKG:  This EKG is signed and interpreted by me. Rate: 59  Rhythm: Sinus  Interpretation: Early repolarization  Comparison: no previous EKG available        ------------------------- NURSING NOTES AND VITALS REVIEWED ---------------------------   The nursing notes within the ED encounter and vital signs as below have been reviewed by myself. /73   Pulse 73   Temp 97.6 °F (36.4 °C)   Resp 16   Ht 5' 11\" (1.803 m)   Wt 146 lb (66.2 kg)   SpO2 99%   BMI 20.36 kg/m²   Oxygen Saturation Interpretation: Normal    The patients available past medical records and past encounters were reviewed. ------------------------------ ED COURSE/MEDICAL DECISION MAKING----------------------  Medications   0.9 % sodium chloride infusion ( Intravenous Not Given 8/5/20 9803)   pantoprazole (PROTONIX) injection 40 mg (40 mg Intravenous Given 8/5/20 3803)   aluminum & magnesium hydroxide-simethicone (MAALOX) 30 mL, lidocaine viscous hcl (XYLOCAINE) 5 mL (GI COCKTAIL) ( Oral Given 8/5/20 1807)             Medical Decision Making:        Re-Evaluations:  Patient rechecked and medicated. Patient significantly improved after being given Protonix. Patient made aware of findings and plan. Patient comfortable with being discharged home. He does report he has an appoint with hematologist in about a week.   Patient does know about his leukopenia. He reports no fever he reports no chills he reports no chest pain. Patient CT from prior evaluation at the end of June noted and reviewed  Patient's abdomen is soft there is no rebound no guarding he longer has abdominal pain in his epigastric region. Patient told to return anytime for any problems. Consultations:                 Critical Care: This patient's ED course included: a personal history and physicial eaxmination    This patient has been closely monitored during their ED course. Counseling: The emergency provider has spoken with the patient and discussed todays results, in addition to providing specific details for the plan of care and counseling regarding the diagnosis and prognosis. Questions are answered at this time and they are agreeable with the plan.       --------------------------------- IMPRESSION AND DISPOSITION ---------------------------------    IMPRESSION  1. Pain of upper abdomen    2. Leukopenia, unspecified type        DISPOSITION  Disposition: Discharge to home  Patient condition is stable        NOTE: This report was transcribed using voice recognition software.  Every effort was made to ensure accuracy; however, inadvertent computerized transcription errors may be present          Iglesia Colvin MD  08/05/20 [address] MD Bro  08/05/20 [address] [facility], MD  08/05/20 0162

## 2020-08-05 NOTE — ED NOTES
Bed: 18B-18  Expected date:   Expected time:   Means of arrival:   Comments:  Mendel Ronde, RN  08/05/20 1160

## 2020-08-05 NOTE — ED NOTES
Patient A&OX4, respirations non-labored, skin warm/dry, no distress noted. Patient discharged per order.       Emiliano Esqueda RN  08/05/20 8466

## 2021-06-01 ENCOUNTER — APPOINTMENT (OUTPATIENT)
Dept: GENERAL RADIOLOGY | Age: 21
DRG: 342 | End: 2021-06-01
Payer: MEDICAID

## 2021-06-01 ENCOUNTER — HOSPITAL ENCOUNTER (INPATIENT)
Age: 21
LOS: 3 days | Discharge: HOME | DRG: 342 | End: 2021-06-04
Attending: EMERGENCY MEDICINE | Admitting: INTERNAL MEDICINE
Payer: MEDICAID

## 2021-06-01 ENCOUNTER — APPOINTMENT (OUTPATIENT)
Dept: CT IMAGING | Age: 21
DRG: 342 | End: 2021-06-01
Payer: MEDICAID
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
## 2021-06-01 DIAGNOSIS — J98.4 CAVITARY LESION OF LUNG: ICD-10-CM

## 2021-06-01 DIAGNOSIS — S52.501A CLOSED FRACTURE OF DISTAL END OF RIGHT RADIUS, UNSPECIFIED FRACTURE MORPHOLOGY, INITIAL ENCOUNTER: ICD-10-CM

## 2021-06-01 LAB
ALBUMIN SERPL-MCNC: 4.9 G/DL (ref 3.5–5.2)
ALP BLD-CCNC: 85 U/L (ref 40–129)
ALT SERPL-CCNC: 156 U/L (ref 0–40)
ANION GAP SERPL CALCULATED.3IONS-SCNC: 16 MMOL/L (ref 7–16)
AST SERPL-CCNC: 206 U/L (ref 0–39)
BASOPHILS ABSOLUTE: 0.01 E9/L (ref 0–0.2)
BASOPHILS RELATIVE PERCENT: 0.1 % (ref 0–2)
BILIRUB SERPL-MCNC: 0.2 MG/DL (ref 0–1.2)
BUN BLDV-MCNC: 7 MG/DL (ref 6–20)
CALCIUM SERPL-MCNC: 9.8 MG/DL (ref 8.6–10.2)
CHLORIDE BLD-SCNC: 102 MMOL/L (ref 98–107)
CO2: 22 MMOL/L (ref 22–29)
CREAT SERPL-MCNC: 0.7 MG/DL (ref 0.7–1.2)
EOSINOPHILS ABSOLUTE: 0 E9/L (ref 0.05–0.5)
EOSINOPHILS RELATIVE PERCENT: 0 % (ref 0–6)
GFR AFRICAN AMERICAN: >60
GFR NON-AFRICAN AMERICAN: >60 ML/MIN/1.73
GLUCOSE BLD-MCNC: 93 MG/DL (ref 74–99)
HCT VFR BLD CALC: 38.9 % (ref 37–54)
HEMOGLOBIN: 13.2 G/DL (ref 12.5–16.5)
IMMATURE GRANULOCYTES #: 0.05 E9/L
IMMATURE GRANULOCYTES %: 0.6 % (ref 0–5)
LACTIC ACID, SEPSIS: 1.9 MMOL/L (ref 0.5–1.9)
LYMPHOCYTES ABSOLUTE: 0.68 E9/L (ref 1.5–4)
LYMPHOCYTES RELATIVE PERCENT: 8.5 % (ref 20–42)
MCH RBC QN AUTO: 31 PG (ref 26–35)
MCHC RBC AUTO-ENTMCNC: 33.9 % (ref 32–34.5)
MCV RBC AUTO: 91.3 FL (ref 80–99.9)
MONOCYTES ABSOLUTE: 0.31 E9/L (ref 0.1–0.95)
MONOCYTES RELATIVE PERCENT: 3.9 % (ref 2–12)
NEUTROPHILS ABSOLUTE: 6.94 E9/L (ref 1.8–7.3)
NEUTROPHILS RELATIVE PERCENT: 86.9 % (ref 43–80)
PDW BLD-RTO: 13.5 FL (ref 11.5–15)
PLATELET # BLD: 210 E9/L (ref 130–450)
PMV BLD AUTO: 9.5 FL (ref 7–12)
POTASSIUM REFLEX MAGNESIUM: 4 MMOL/L (ref 3.5–5)
RBC # BLD: 4.26 E12/L (ref 3.8–5.8)
SARS-COV-2, NAAT: NOT DETECTED
SODIUM BLD-SCNC: 140 MMOL/L (ref 132–146)
TOTAL PROTEIN: 8.3 G/DL (ref 6.4–8.3)
WBC # BLD: 8 E9/L (ref 4.5–11.5)

## 2021-06-01 PROCEDURE — 85025 COMPLETE CBC W/AUTO DIFF WBC: CPT

## 2021-06-01 PROCEDURE — 73070 X-RAY EXAM OF ELBOW: CPT

## 2021-06-01 PROCEDURE — 70450 CT HEAD/BRAIN W/O DYE: CPT

## 2021-06-01 PROCEDURE — 87040 BLOOD CULTURE FOR BACTERIA: CPT

## 2021-06-01 PROCEDURE — 1200000000 HC SEMI PRIVATE

## 2021-06-01 PROCEDURE — 87635 SARS-COV-2 COVID-19 AMP PRB: CPT

## 2021-06-01 PROCEDURE — 96374 THER/PROPH/DIAG INJ IV PUSH: CPT

## 2021-06-01 PROCEDURE — 6370000000 HC RX 637 (ALT 250 FOR IP): Performed by: NURSE PRACTITIONER

## 2021-06-01 PROCEDURE — 99284 EMERGENCY DEPT VISIT MOD MDM: CPT

## 2021-06-01 PROCEDURE — 80053 COMPREHEN METABOLIC PANEL: CPT

## 2021-06-01 PROCEDURE — 71250 CT THORAX DX C-: CPT

## 2021-06-01 PROCEDURE — 70486 CT MAXILLOFACIAL W/O DYE: CPT

## 2021-06-01 PROCEDURE — 2580000003 HC RX 258: Performed by: NURSE PRACTITIONER

## 2021-06-01 PROCEDURE — 99223 1ST HOSP IP/OBS HIGH 75: CPT | Performed by: INTERNAL MEDICINE

## 2021-06-01 PROCEDURE — 73110 X-RAY EXAM OF WRIST: CPT

## 2021-06-01 PROCEDURE — 6360000002 HC RX W HCPCS: Performed by: STUDENT IN AN ORGANIZED HEALTH CARE EDUCATION/TRAINING PROGRAM

## 2021-06-01 PROCEDURE — 73030 X-RAY EXAM OF SHOULDER: CPT

## 2021-06-01 PROCEDURE — 29125 APPL SHORT ARM SPLINT STATIC: CPT

## 2021-06-01 PROCEDURE — 83605 ASSAY OF LACTIC ACID: CPT

## 2021-06-01 PROCEDURE — 72125 CT NECK SPINE W/O DYE: CPT

## 2021-06-01 RX ORDER — FENTANYL CITRATE 50 UG/ML
50 INJECTION, SOLUTION INTRAMUSCULAR; INTRAVENOUS ONCE
Status: DISCONTINUED | OUTPATIENT
Start: 2021-06-01 | End: 2021-06-01

## 2021-06-01 RX ORDER — IBUPROFEN 400 MG/1
400 TABLET ORAL EVERY 6 HOURS PRN
Status: DISCONTINUED | OUTPATIENT
Start: 2021-06-01 | End: 2021-06-04 | Stop reason: HOSPADM

## 2021-06-01 RX ORDER — TRAMADOL HYDROCHLORIDE 50 MG/1
50 TABLET ORAL EVERY 6 HOURS PRN
Status: DISCONTINUED | OUTPATIENT
Start: 2021-06-01 | End: 2021-06-04 | Stop reason: HOSPADM

## 2021-06-01 RX ORDER — FENTANYL CITRATE 50 UG/ML
50 INJECTION, SOLUTION INTRAMUSCULAR; INTRAVENOUS ONCE
Status: COMPLETED | OUTPATIENT
Start: 2021-06-01 | End: 2021-06-01

## 2021-06-01 RX ORDER — TRAMADOL HYDROCHLORIDE 50 MG/1
100 TABLET ORAL EVERY 6 HOURS PRN
Status: DISCONTINUED | OUTPATIENT
Start: 2021-06-01 | End: 2021-06-04 | Stop reason: HOSPADM

## 2021-06-01 RX ORDER — PROMETHAZINE HYDROCHLORIDE 25 MG/1
12.5 TABLET ORAL EVERY 6 HOURS PRN
Status: DISCONTINUED | OUTPATIENT
Start: 2021-06-01 | End: 2021-06-04 | Stop reason: HOSPADM

## 2021-06-01 RX ORDER — POLYETHYLENE GLYCOL 3350 17 G/17G
17 POWDER, FOR SOLUTION ORAL DAILY PRN
Status: DISCONTINUED | OUTPATIENT
Start: 2021-06-01 | End: 2021-06-04 | Stop reason: HOSPADM

## 2021-06-01 RX ORDER — ACETAMINOPHEN 325 MG/1
650 TABLET ORAL EVERY 6 HOURS PRN
Status: DISCONTINUED | OUTPATIENT
Start: 2021-06-01 | End: 2021-06-01

## 2021-06-01 RX ORDER — ONDANSETRON 2 MG/ML
4 INJECTION INTRAMUSCULAR; INTRAVENOUS EVERY 6 HOURS PRN
Status: DISCONTINUED | OUTPATIENT
Start: 2021-06-01 | End: 2021-06-04 | Stop reason: HOSPADM

## 2021-06-01 RX ORDER — SODIUM CHLORIDE 0.9 % (FLUSH) 0.9 %
5-40 SYRINGE (ML) INJECTION PRN
Status: DISCONTINUED | OUTPATIENT
Start: 2021-06-01 | End: 2021-06-04 | Stop reason: HOSPADM

## 2021-06-01 RX ORDER — ACETAMINOPHEN 650 MG/1
650 SUPPOSITORY RECTAL EVERY 6 HOURS PRN
Status: DISCONTINUED | OUTPATIENT
Start: 2021-06-01 | End: 2021-06-01

## 2021-06-01 RX ORDER — SODIUM CHLORIDE 9 MG/ML
25 INJECTION, SOLUTION INTRAVENOUS PRN
Status: DISCONTINUED | OUTPATIENT
Start: 2021-06-01 | End: 2021-06-04 | Stop reason: HOSPADM

## 2021-06-01 RX ORDER — SODIUM CHLORIDE 0.9 % (FLUSH) 0.9 %
5-40 SYRINGE (ML) INJECTION EVERY 12 HOURS SCHEDULED
Status: DISCONTINUED | OUTPATIENT
Start: 2021-06-01 | End: 2021-06-04 | Stop reason: HOSPADM

## 2021-06-01 RX ADMIN — IBUPROFEN 400 MG: 400 TABLET, FILM COATED ORAL at 20:19

## 2021-06-01 RX ADMIN — Medication 10 ML: at 20:19

## 2021-06-01 RX ADMIN — TRAMADOL HYDROCHLORIDE 100 MG: 50 TABLET, FILM COATED ORAL at 17:38

## 2021-06-01 RX ADMIN — FENTANYL CITRATE 50 MCG: 50 INJECTION, SOLUTION INTRAMUSCULAR; INTRAVENOUS at 11:27

## 2021-06-01 ASSESSMENT — PAIN DESCRIPTION - LOCATION
LOCATION: WRIST
LOCATION: WRIST

## 2021-06-01 ASSESSMENT — ENCOUNTER SYMPTOMS
COUGH: 0
DIARRHEA: 0
BACK PAIN: 0
SHORTNESS OF BREATH: 0
NAUSEA: 0
CHEST TIGHTNESS: 0
PHOTOPHOBIA: 0
ABDOMINAL DISTENTION: 0
VOMITING: 0
ABDOMINAL PAIN: 0

## 2021-06-01 ASSESSMENT — PAIN DESCRIPTION - ONSET: ONSET: ON-GOING

## 2021-06-01 ASSESSMENT — PAIN SCALES - GENERAL
PAINLEVEL_OUTOF10: 6
PAINLEVEL_OUTOF10: 3
PAINLEVEL_OUTOF10: 10
PAINLEVEL_OUTOF10: 4
PAINLEVEL_OUTOF10: 3
PAINLEVEL_OUTOF10: 0
PAINLEVEL_OUTOF10: 10

## 2021-06-01 ASSESSMENT — PAIN DESCRIPTION - PROGRESSION: CLINICAL_PROGRESSION: NOT CHANGED

## 2021-06-01 ASSESSMENT — PAIN DESCRIPTION - DESCRIPTORS
DESCRIPTORS: ACHING;THROBBING
DESCRIPTORS: SORE

## 2021-06-01 ASSESSMENT — PAIN DESCRIPTION - ORIENTATION
ORIENTATION: RIGHT
ORIENTATION: RIGHT

## 2021-06-01 ASSESSMENT — PAIN DESCRIPTION - FREQUENCY: FREQUENCY: CONTINUOUS

## 2021-06-01 ASSESSMENT — PAIN DESCRIPTION - PAIN TYPE: TYPE: ACUTE PAIN
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
## 2021-06-01 NOTE — H&P
Admission:      Prior to Admission medications    Not on File       Allergies:  Patient has no known allergies. Social History:    RESIDENCE: Private residence  TOBACCO:   reports that he has been smoking cigarettes. He has been smoking about 0.25 packs per day. He has never used smokeless tobacco.  ETOH:   reports no history of alcohol use. Family History:    As follows:  History reviewed. No pertinent family history. REVIEW OF SYSTEMS:   Pertinent positives as noted in the HPI. All other systems reviewed and negative. PHYSICAL EXAM:  /65   Pulse 73   Temp 98.1 °F (36.7 °C)   Resp 20   Ht 5' 10\" (1.778 m)   Wt 140 lb (63.5 kg)   SpO2 98%   BMI 20.09 kg/m²   General appearance: No apparent distress, appears stated age and cooperative. HEENT: Normal cephalic, atraumatic without obvious deformity. Pupils equal, round, and reactive to light. Neck: Supple, with full range of motion. No jugular venous distention. Trachea midline. Respiratory:  Clear to auscultation bilaterally. No apparent distress on room air. Cardiovascular:  Regular rate and rhythm. S1, S2 without murmurs, rubs, or gallops. PV: Brisk capillary refill. +2 pedal and radial pulses bilaterally. No edema noted. Abdomen: Soft, non-tender, non-distended. +BS  Musculoskeletal: Right forearm splint in place, fingers warm + brisk cap refill, +movement and sensation  Skin: Normal skin color. No rashes appreciated. Neurologic:  Neurovascularly intact without any focal sensory/motor deficits.   Psychiatric: Alert and oriented, thought content appropriate, normal insight    Reviewed EKG and CXR personally      CBC:   Recent Labs     06/01/21  1132   WBC 8.0   RBC 4.26   HGB 13.2   HCT 38.9   MCV 91.3   RDW 13.5        BMP:   Recent Labs     06/01/21  1132      K 4.0      CO2 22   BUN 7   CREATININE 0.7     LFT:  Recent Labs     06/01/21  1132   PROT 8.3   ALKPHOS 85   *   *   BILITOT 0.2 CE:  No results for input(s): Srinivas Killings in the last 72 hours. PT/INR: No results for input(s): INR, APTT in the last 72 hours. BNP: No results for input(s): BNP in the last 72 hours. ESR: No results found for: SEDRATE  CRP: No results found for: CRP  D Dimer: No results found for: DDIMER   Folate and B12: No results found for: IYRMAEAO77, No results found for: FOLATE  Lactic Acid:   Lab Results   Component Value Date    LACTA 0.8 08/05/2020     Thyroid Studies: No results found for: TSH, N8BZQCD, A8KPYZE, THYROIDAB    Oupatient labs:  No results found for: CHOL, TRIG, HDL, LDLCALC, TSH, PSA, INR, LABA1C    Urinalysis:    Lab Results   Component Value Date    NITRU Negative 08/05/2020    WBCUA 0-1 08/05/2020    BACTERIA FEW 08/05/2020    RBCUA 1-3 08/05/2020    BLOODU TRACE-INTACT 08/05/2020    SPECGRAV 1.025 08/05/2020    GLUCOSEU Negative 08/05/2020       Imaging:  XR SHOULDER RIGHT (MIN 2 VIEWS)    Result Date: 6/1/2021  No acute fracture is seen. The distal clavicle appears slightly elevated with respect to the acromion. If there is concern for acromioclavicular ligamentous injury, dedicated imaging could be obtained with and without weights. XR ELBOW RIGHT (2 VIEWS)    Result Date: 6/1/2021  No acute osseous abnormality is identified. If there are persistent symptoms, follow-up radiograph could be obtained in 7-10 days to exclude occult fracture     XR WRIST RIGHT (MIN 3 VIEWS)    Result Date: 6/1/2021  Findings suggestive of a linear fracture adjacent to the growth plate of the distal radius. It is nondisplaced     CT HEAD WO CONTRAST    Result Date: 6/1/2021  No acute intracranial, Facial or cervical abnormality. CT FACIAL BONES WO CONTRAST    Result Date: 6/1/2021  No acute intracranial, Facial or cervical abnormality. CT CHEST WO CONTRAST    Result Date: 6/1/2021  1. Right lower lobe infiltrate with 6 cm central cavitary component compatible with subacute cavitary pneumonia.
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
## 2021-06-02 LAB
ANION GAP SERPL CALCULATED.3IONS-SCNC: 11 MMOL/L (ref 7–16)
BASOPHILS ABSOLUTE: 0.01 E9/L (ref 0–0.2)
BASOPHILS RELATIVE PERCENT: 0.2 % (ref 0–2)
BUN BLDV-MCNC: 8 MG/DL (ref 6–20)
CALCIUM SERPL-MCNC: 9.8 MG/DL (ref 8.6–10.2)
CHLORIDE BLD-SCNC: 98 MMOL/L (ref 98–107)
CO2: 27 MMOL/L (ref 22–29)
CREAT SERPL-MCNC: 0.8 MG/DL (ref 0.7–1.2)
EOSINOPHILS ABSOLUTE: 0.06 E9/L (ref 0.05–0.5)
EOSINOPHILS RELATIVE PERCENT: 1.4 % (ref 0–6)
GFR AFRICAN AMERICAN: >60
GFR NON-AFRICAN AMERICAN: >60 ML/MIN/1.73
GLUCOSE BLD-MCNC: 85 MG/DL (ref 74–99)
HCT VFR BLD CALC: 39.9 % (ref 37–54)
HEMOGLOBIN: 13.5 G/DL (ref 12.5–16.5)
IMMATURE GRANULOCYTES #: 0.08 E9/L
IMMATURE GRANULOCYTES %: 1.9 % (ref 0–5)
LYMPHOCYTES ABSOLUTE: 0.72 E9/L (ref 1.5–4)
LYMPHOCYTES RELATIVE PERCENT: 16.8 % (ref 20–42)
MCH RBC QN AUTO: 31.8 PG (ref 26–35)
MCHC RBC AUTO-ENTMCNC: 33.8 % (ref 32–34.5)
MCV RBC AUTO: 93.9 FL (ref 80–99.9)
MONOCYTES ABSOLUTE: 0.32 E9/L (ref 0.1–0.95)
MONOCYTES RELATIVE PERCENT: 7.5 % (ref 2–12)
NEUTROPHILS ABSOLUTE: 3.09 E9/L (ref 1.8–7.3)
NEUTROPHILS RELATIVE PERCENT: 72.2 % (ref 43–80)
PDW BLD-RTO: 13.8 FL (ref 11.5–15)
PLATELET # BLD: 199 E9/L (ref 130–450)
PMV BLD AUTO: 9.9 FL (ref 7–12)
POTASSIUM REFLEX MAGNESIUM: 4 MMOL/L (ref 3.5–5)
RBC # BLD: 4.25 E12/L (ref 3.8–5.8)
SODIUM BLD-SCNC: 136 MMOL/L (ref 132–146)
WBC # BLD: 4.3 E9/L (ref 4.5–11.5)

## 2021-06-02 PROCEDURE — 80048 BASIC METABOLIC PNL TOTAL CA: CPT

## 2021-06-02 PROCEDURE — 6370000000 HC RX 637 (ALT 250 FOR IP): Performed by: NURSE PRACTITIONER

## 2021-06-02 PROCEDURE — 6360000002 HC RX W HCPCS: Performed by: INTERNAL MEDICINE

## 2021-06-02 PROCEDURE — 99233 SBSQ HOSP IP/OBS HIGH 50: CPT | Performed by: INTERNAL MEDICINE

## 2021-06-02 PROCEDURE — 2580000003 HC RX 258: Performed by: INTERNAL MEDICINE

## 2021-06-02 PROCEDURE — 2580000003 HC RX 258: Performed by: NURSE PRACTITIONER

## 2021-06-02 PROCEDURE — 6360000002 HC RX W HCPCS: Performed by: NURSE PRACTITIONER

## 2021-06-02 PROCEDURE — 85025 COMPLETE CBC W/AUTO DIFF WBC: CPT

## 2021-06-02 PROCEDURE — 1200000000 HC SEMI PRIVATE

## 2021-06-02 PROCEDURE — 36415 COLL VENOUS BLD VENIPUNCTURE: CPT

## 2021-06-02 RX ADMIN — TRAMADOL HYDROCHLORIDE 100 MG: 50 TABLET, FILM COATED ORAL at 01:22

## 2021-06-02 RX ADMIN — TRAMADOL HYDROCHLORIDE 50 MG: 50 TABLET, FILM COATED ORAL at 23:21

## 2021-06-02 RX ADMIN — AMPICILLIN SODIUM AND SULBACTAM SODIUM 3000 MG: 2; 1 INJECTION, POWDER, FOR SOLUTION INTRAMUSCULAR; INTRAVENOUS at 00:56

## 2021-06-02 RX ADMIN — AMPICILLIN SODIUM AND SULBACTAM SODIUM 3000 MG: 2; 1 INJECTION, POWDER, FOR SOLUTION INTRAMUSCULAR; INTRAVENOUS at 22:57

## 2021-06-02 RX ADMIN — IBUPROFEN 400 MG: 400 TABLET, FILM COATED ORAL at 22:58

## 2021-06-02 RX ADMIN — TRAMADOL HYDROCHLORIDE 100 MG: 50 TABLET, FILM COATED ORAL at 11:07

## 2021-06-02 RX ADMIN — AMPICILLIN SODIUM AND SULBACTAM SODIUM 3000 MG: 2; 1 INJECTION, POWDER, FOR SOLUTION INTRAMUSCULAR; INTRAVENOUS at 05:34

## 2021-06-02 RX ADMIN — ENOXAPARIN SODIUM 40 MG: 40 INJECTION SUBCUTANEOUS at 09:41

## 2021-06-02 RX ADMIN — AMPICILLIN SODIUM AND SULBACTAM SODIUM 3000 MG: 2; 1 INJECTION, POWDER, FOR SOLUTION INTRAMUSCULAR; INTRAVENOUS at 11:53

## 2021-06-02 RX ADMIN — Medication 10 ML: at 09:41

## 2021-06-02 ASSESSMENT — PAIN DESCRIPTION - ORIENTATION: ORIENTATION: RIGHT

## 2021-06-02 ASSESSMENT — PAIN SCALES - GENERAL
PAINLEVEL_OUTOF10: 2
PAINLEVEL_OUTOF10: 2
PAINLEVEL_OUTOF10: 7
PAINLEVEL_OUTOF10: 7
PAINLEVEL_OUTOF10: 5
PAINLEVEL_OUTOF10: 0
PAINLEVEL_OUTOF10: 3
PAINLEVEL_OUTOF10: 5

## 2021-06-02 ASSESSMENT — PAIN DESCRIPTION - LOCATION: LOCATION: WRIST
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
## 2021-06-02 NOTE — PROGRESS NOTES
Pt alert and oriented, minimal pain in the right arm, lungs clear, non-productive cough, S1S2 normal, call light within reach, will continue to monitor
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
## 2021-06-02 NOTE — PLAN OF CARE
Problem: Pain:  Goal: Pain level will decrease  Description: Pain level will decrease  6/2/2021 3326 by Yamilet Olvera RN  Outcome: Ongoing  6/2/2021 0109 by Fatou Escalante RN  Outcome: Ongoing  Goal: Control of acute pain  Description: Control of acute pain  6/2/2021 5217 by Yamilet Olvera RN  Outcome: Ongoing  6/2/2021 0109 by Fatou Escalante RN  Outcome: Ongoing  Goal: Control of chronic pain  Description: Control of chronic pain  6/2/2021 8522 by Yamilet Olvera RN  Outcome: Ongoing  6/2/2021 0109 by Fatou Escalante RN  Outcome: Ongoing

## 2021-06-03 LAB
C-REACTIVE PROTEIN: 1.6 MG/DL (ref 0–0.4)
PROCALCITONIN: 0.12 NG/ML (ref 0–0.08)

## 2021-06-03 PROCEDURE — 36415 COLL VENOUS BLD VENIPUNCTURE: CPT

## 2021-06-03 PROCEDURE — 6370000000 HC RX 637 (ALT 250 FOR IP): Performed by: NURSE PRACTITIONER

## 2021-06-03 PROCEDURE — 86703 HIV-1/HIV-2 1 RESULT ANTBDY: CPT

## 2021-06-03 PROCEDURE — 99233 SBSQ HOSP IP/OBS HIGH 50: CPT | Performed by: INTERNAL MEDICINE

## 2021-06-03 PROCEDURE — 2580000003 HC RX 258: Performed by: INTERNAL MEDICINE

## 2021-06-03 PROCEDURE — 87070 CULTURE OTHR SPECIMN AEROBIC: CPT

## 2021-06-03 PROCEDURE — 2580000003 HC RX 258: Performed by: NURSE PRACTITIONER

## 2021-06-03 PROCEDURE — 84145 PROCALCITONIN (PCT): CPT

## 2021-06-03 PROCEDURE — 87206 SMEAR FLUORESCENT/ACID STAI: CPT

## 2021-06-03 PROCEDURE — 86140 C-REACTIVE PROTEIN: CPT

## 2021-06-03 PROCEDURE — 6360000002 HC RX W HCPCS: Performed by: INTERNAL MEDICINE

## 2021-06-03 PROCEDURE — 1200000000 HC SEMI PRIVATE

## 2021-06-03 RX ADMIN — AMPICILLIN SODIUM AND SULBACTAM SODIUM 3000 MG: 2; 1 INJECTION, POWDER, FOR SOLUTION INTRAMUSCULAR; INTRAVENOUS at 11:50

## 2021-06-03 RX ADMIN — TRAMADOL HYDROCHLORIDE 50 MG: 50 TABLET, FILM COATED ORAL at 22:58

## 2021-06-03 RX ADMIN — AMPICILLIN SODIUM AND SULBACTAM SODIUM 3000 MG: 2; 1 INJECTION, POWDER, FOR SOLUTION INTRAMUSCULAR; INTRAVENOUS at 22:58

## 2021-06-03 RX ADMIN — Medication 10 ML: at 20:08

## 2021-06-03 RX ADMIN — AMPICILLIN SODIUM AND SULBACTAM SODIUM 3000 MG: 2; 1 INJECTION, POWDER, FOR SOLUTION INTRAMUSCULAR; INTRAVENOUS at 18:01

## 2021-06-03 RX ADMIN — Medication 10 ML: at 09:10

## 2021-06-03 RX ADMIN — AMPICILLIN SODIUM AND SULBACTAM SODIUM 3000 MG: 2; 1 INJECTION, POWDER, FOR SOLUTION INTRAMUSCULAR; INTRAVENOUS at 06:18

## 2021-06-03 ASSESSMENT — PAIN SCALES - GENERAL
PAINLEVEL_OUTOF10: 5
PAINLEVEL_OUTOF10: 5
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
## 2021-06-03 NOTE — PLAN OF CARE
Problem: Pain:  Goal: Pain level will decrease  Description: Pain level will decrease  6/3/2021 0141 by Philip Schulte RN  Outcome: Met This Shift  6/2/2021 8954 by Adam Dior RN  Outcome: Ongoing  Goal: Control of acute pain  Description: Control of acute pain  6/3/2021 0141 by Philip Schulte RN  Outcome: Met This Shift  6/2/2021 7876 by Adam Dior RN  Outcome: Ongoing  Goal: Control of chronic pain  Description: Control of chronic pain  6/3/2021 0141 by Philip Schulte RN  Outcome: Met This Shift  6/2/2021 5076 by Adam Dior RN  Outcome: Ongoing
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
## 2021-06-03 NOTE — PROGRESS NOTES
Patient had belongings brought to the ER entrance, upon being delivered the  searched the bag and found two vapes. They were taken and placed in the patient's chart for upon discharge. RN educated the patient.
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
## 2021-06-03 NOTE — PLAN OF CARE
Problem: Pain:  Goal: Pain level will decrease  Description: Pain level will decrease  6/3/2021 4743 by Corey Mahajan RN  Outcome: Ongoing  6/3/2021 0141 by Ana Maria Campbell RN  Outcome: Met This Shift  Goal: Control of acute pain  Description: Control of acute pain  6/3/2021 9760 by Corey Mahajan RN  Outcome: Ongoing  6/3/2021 0141 by Ana Maria Campbell RN  Outcome: Met This Shift  Goal: Control of chronic pain  Description: Control of chronic pain  6/3/2021 1633 by Corey Mahajan RN  Outcome: Ongoing  6/3/2021 0141 by Ana Maria Campbell RN  Outcome: Met This Shift

## 2021-06-03 NOTE — PROGRESS NOTES
Pt alert and oriented, minimal pain in the right arm, lungs clear, non-productive cough, S1S2 normal, call light within reach, will continue to monitor. 1500: Pt coughed up a small amount of blood, made attending and pulmonology aware, orders received for sputum sample.

## 2021-06-04 VITALS
HEART RATE: 64 BPM | BODY MASS INDEX: 20.04 KG/M2 | SYSTOLIC BLOOD PRESSURE: 108 MMHG | HEIGHT: 70 IN | DIASTOLIC BLOOD PRESSURE: 84 MMHG | TEMPERATURE: 97.3 F | OXYGEN SATURATION: 100 % | RESPIRATION RATE: 12 BRPM | WEIGHT: 140 LBS

## 2021-06-04 LAB — HIV-1 AND HIV-2 ANTIBODIES: NORMAL

## 2021-06-04 PROCEDURE — 99232 SBSQ HOSP IP/OBS MODERATE 35: CPT | Performed by: INTERNAL MEDICINE

## 2021-06-04 PROCEDURE — 2580000003 HC RX 258: Performed by: NURSE PRACTITIONER

## 2021-06-04 PROCEDURE — 6360000002 HC RX W HCPCS: Performed by: INTERNAL MEDICINE

## 2021-06-04 PROCEDURE — 6370000000 HC RX 637 (ALT 250 FOR IP): Performed by: NURSE PRACTITIONER

## 2021-06-04 PROCEDURE — 2580000003 HC RX 258: Performed by: INTERNAL MEDICINE

## 2021-06-04 RX ORDER — AMOXICILLIN AND CLAVULANATE POTASSIUM 562.5; 437.5; 62.5 MG/1; MG/1; MG/1
1 TABLET, FILM COATED, EXTENDED RELEASE ORAL 2 TIMES DAILY
Qty: 20 TABLET | Refills: 0 | Status: SHIPPED | OUTPATIENT
Start: 2021-06-04 | End: 2021-06-14

## 2021-06-04 RX ORDER — TRAMADOL HYDROCHLORIDE 50 MG/1
50 TABLET ORAL EVERY 6 HOURS PRN
Qty: 20 TABLET | Refills: 0 | Status: SHIPPED | OUTPATIENT
Start: 2021-06-04 | End: 2021-06-09

## 2021-06-04 RX ADMIN — AMPICILLIN SODIUM AND SULBACTAM SODIUM 3000 MG: 2; 1 INJECTION, POWDER, FOR SOLUTION INTRAMUSCULAR; INTRAVENOUS at 06:19

## 2021-06-04 RX ADMIN — TRAMADOL HYDROCHLORIDE 50 MG: 50 TABLET, FILM COATED ORAL at 07:06

## 2021-06-04 RX ADMIN — Medication 10 ML: at 08:53

## 2021-06-04 RX ADMIN — AMPICILLIN SODIUM AND SULBACTAM SODIUM 3000 MG: 2; 1 INJECTION, POWDER, FOR SOLUTION INTRAMUSCULAR; INTRAVENOUS at 12:06

## 2021-06-04 ASSESSMENT — PAIN SCALES - GENERAL: PAINLEVEL_OUTOF10: 6
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
## 2021-06-04 NOTE — PLAN OF CARE
Problem: Pain:  Goal: Pain level will decrease  Description: Pain level will decrease  6/4/2021 0034 by Gloria Prasad RN  Outcome: Met This Shift  6/3/2021 9266 by Cherylene Edison, RN  Outcome: Ongoing  Goal: Control of acute pain  Description: Control of acute pain  6/4/2021 0034 by Gloria Prasad RN  Outcome: Met This Shift  6/3/2021 4700 by Cherylene Edison, RN  Outcome: Ongoing  Goal: Control of chronic pain  Description: Control of chronic pain  6/4/2021 0034 by Gloria Prasad RN  Outcome: Met This Shift  6/3/2021 0724 by Cherylene Edison, RN  Outcome: Ongoing

## 2021-06-04 NOTE — PROGRESS NOTES
distress  Respiratory: Clear breath sounds, no crackles, no wheezes  Cardiovascular: Regular rate and rhythm, no murmurs  Gastrointestinal: Bowel sounds present, soft, nontender  Skin: Warm and dry, no active dermatoses  Musculoskeletal: No joint swelling, no joint erythema    Labs, imaging, and medical records/notes were personally reviewed. Assessment:  Cavitary lung lesion  Alcohol abuse    Recommendations:  Continue ampicillin-sulbactam 3 g every 6 hours. Anticipate switch to Augmentin XR twice daily on discharge to finish 2 weeks of antibiotic therapy from 06/02-06/15 then repeat chest CT to reassess abscess status.     Plan was discussed with Dr. Aleksander Quach.     Thank you for involving me in the care of Elena Becerra. I will continue to follow. Please do not hesitate to call for any questions or concerns.     Electronically signed by Earnestine Garcia MD on 6/4/2021 at 11:29 AM
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
## 2021-06-04 NOTE — CARE COORDINATION
Per ID note from today, Continue ampicillin-sulbactam 3 g every 6 hours.  Anticipate switch to Augmentin XR twice daily on discharge to finish 2 weeks of antibiotic therapy from 06/02-06/15 then repeat chest CT to reassess abscess status. Pulmonary is also following. CM/Sw following for any discharge needs.   Hira Strickland RN CM

## 2021-06-04 NOTE — PROGRESS NOTES
Pt alert and oriented, minimal pain in the right arm, lungs clear, productive cough with a small amount of blood, S1S2 normal, call light within reach, will continue to monitor

## 2021-06-04 NOTE — PLAN OF CARE
Problem: Pain:  Goal: Pain level will decrease  Description: Pain level will decrease  6/4/2021 3219 by Sunitha Villarreal RN  Outcome: Ongoing  6/4/2021 0034 by Genaro Wheeler RN  Outcome: Met This Shift  Goal: Control of acute pain  Description: Control of acute pain  6/4/2021 5135 by Sunitha Villarreal RN  Outcome: Ongoing  6/4/2021 0034 by Genaro Wheeler RN  Outcome: Met This Shift  Goal: Control of chronic pain  Description: Control of chronic pain  6/4/2021 7273 by Sunitha Villarreal RN  Outcome: Ongoing  6/4/2021 0034 by Genaro Wheeler RN  Outcome: Met This Shift

## 2021-06-05 LAB
CULTURE, RESPIRATORY: NORMAL
SMEAR, RESPIRATORY: NORMAL

## 2021-06-06 LAB
BLOOD CULTURE, ROUTINE: NORMAL
CULTURE, BLOOD 2: NORMAL
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
## 2021-06-21 ENCOUNTER — TELEPHONE (OUTPATIENT)
Dept: PULMONOLOGY | Age: 21
End: 2021-06-21

## 2021-06-21 DIAGNOSIS — S62.101A CLOSED FRACTURE OF RIGHT WRIST, INITIAL ENCOUNTER: Primary | ICD-10-CM

## 2021-06-21 NOTE — TELEPHONE ENCOUNTER
Call back from Erasmo to confirm appt for Chest CT and office visit . Pt verbalized understanding and advised appt letter for CT scan and appt card for office visit sent via mail. Pt requesting assist with PCP. Offered referral to Internal Medicine for routine hospital follow up. Pt states he has a cast on his right arm that needs follow up from recent hospital stay. Advised pt office will place referral for Internal Medicine clinic here at [facility] to follow up with him.

## 2021-06-21 NOTE — TELEPHONE ENCOUNTER
Pt mom calling for hospital follow up in office. Spoke with pt on return call. Chest CT ordered post hospital and Dr. Beavers Mass notes reviewed. Pt reports he finished antibiotics last Friday and was not sure about follow up re: his cast on his arm as well as follow up about his lungs. Appt card to be mailed after Chest CT is scheduled. RN to assist with follow up CT scheduling this week. Pt denies any problems with breathing or fever/cough. RN spoke with [address] [place] [address] and arranged for Chest CT to be done at [facility] on Friday June 25th@ 10am. Call returned to pt to advise on scheduled Chest CT and follow up with Dr. Gamaliel Esposito in office next week(no answer and unable to leave VM on call back). Appt card and CT Appt time/date info mailed to pt.

## 2021-06-25 ENCOUNTER — HOSPITAL ENCOUNTER (OUTPATIENT)
Dept: CT IMAGING | Age: 21
Discharge: HOME | DRG: 137 | End: 2021-06-27
Payer: MEDICAID

## 2021-06-25 DIAGNOSIS — J98.4 CAVITARY LESION OF LUNG: ICD-10-CM

## 2021-06-25 PROCEDURE — 71270 CT THORAX DX C-/C+: CPT

## 2021-06-25 PROCEDURE — 6360000004 HC RX CONTRAST MEDICATION: Performed by: RADIOLOGY

## 2021-06-25 RX ADMIN — IOPAMIDOL 75 ML: 755 INJECTION, SOLUTION INTRAVENOUS at 09:47

## 2021-06-26 ENCOUNTER — HOSPITAL ENCOUNTER (INPATIENT)
Age: 21
LOS: 4 days | Discharge: HOME | DRG: 137 | End: 2021-06-30
Attending: EMERGENCY MEDICINE | Admitting: HOSPITALIST
Payer: MEDICAID

## 2021-06-26 ENCOUNTER — APPOINTMENT (OUTPATIENT)
Dept: GENERAL RADIOLOGY | Age: 21
DRG: 137 | End: 2021-06-26
Payer: MEDICAID

## 2021-06-26 DIAGNOSIS — R04.2 HEMOPTYSIS: ICD-10-CM

## 2021-06-26 DIAGNOSIS — J98.4 CAVITARY LESION OF LUNG: Primary | ICD-10-CM

## 2021-06-26 LAB
ALBUMIN SERPL-MCNC: 4.5 G/DL (ref 3.5–5.2)
ALP BLD-CCNC: 81 U/L (ref 40–129)
ALT SERPL-CCNC: 15 U/L (ref 0–40)
ANION GAP SERPL CALCULATED.3IONS-SCNC: 10 MMOL/L (ref 7–16)
AST SERPL-CCNC: 21 U/L (ref 0–39)
BASOPHILS ABSOLUTE: 0.01 E9/L (ref 0–0.2)
BASOPHILS RELATIVE PERCENT: 0.5 % (ref 0–2)
BILIRUB SERPL-MCNC: 0.4 MG/DL (ref 0–1.2)
BUN BLDV-MCNC: 7 MG/DL (ref 6–20)
CALCIUM SERPL-MCNC: 9.8 MG/DL (ref 8.6–10.2)
CHLORIDE BLD-SCNC: 103 MMOL/L (ref 98–107)
CO2: 25 MMOL/L (ref 22–29)
CREAT SERPL-MCNC: 0.8 MG/DL (ref 0.7–1.2)
EKG ATRIAL RATE: 59 BPM
EKG P AXIS: 61 DEGREES
EKG P-R INTERVAL: 200 MS
EKG Q-T INTERVAL: 422 MS
EKG QRS DURATION: 118 MS
EKG QTC CALCULATION (BAZETT): 417 MS
EKG R AXIS: 93 DEGREES
EKG T AXIS: 85 DEGREES
EKG VENTRICULAR RATE: 59 BPM
EOSINOPHILS ABSOLUTE: 0.06 E9/L (ref 0.05–0.5)
EOSINOPHILS RELATIVE PERCENT: 3 % (ref 0–6)
GFR AFRICAN AMERICAN: >60
GFR NON-AFRICAN AMERICAN: >60 ML/MIN/1.73
GLUCOSE BLD-MCNC: 80 MG/DL (ref 74–99)
HCT VFR BLD CALC: 39.2 % (ref 37–54)
HEMOGLOBIN: 12.9 G/DL (ref 12.5–16.5)
IMMATURE GRANULOCYTES #: 0.03 E9/L
IMMATURE GRANULOCYTES %: 1.5 % (ref 0–5)
LYMPHOCYTES ABSOLUTE: 0.66 E9/L (ref 1.5–4)
LYMPHOCYTES RELATIVE PERCENT: 32.8 % (ref 20–42)
MCH RBC QN AUTO: 30.9 PG (ref 26–35)
MCHC RBC AUTO-ENTMCNC: 32.9 % (ref 32–34.5)
MCV RBC AUTO: 93.8 FL (ref 80–99.9)
MONOCYTES ABSOLUTE: 0.2 E9/L (ref 0.1–0.95)
MONOCYTES RELATIVE PERCENT: 10 % (ref 2–12)
NEUTROPHILS ABSOLUTE: 1.05 E9/L (ref 1.8–7.3)
NEUTROPHILS RELATIVE PERCENT: 52.2 % (ref 43–80)
PDW BLD-RTO: 13.4 FL (ref 11.5–15)
PLATELET # BLD: 164 E9/L (ref 130–450)
PMV BLD AUTO: 9.9 FL (ref 7–12)
POTASSIUM REFLEX MAGNESIUM: 4.4 MMOL/L (ref 3.5–5)
RBC # BLD: 4.18 E12/L (ref 3.8–5.8)
SODIUM BLD-SCNC: 138 MMOL/L (ref 132–146)
TOTAL PROTEIN: 7.6 G/DL (ref 6.4–8.3)
TROPONIN, HIGH SENSITIVITY: <6 NG/L (ref 0–11)
WBC # BLD: 2 E9/L (ref 4.5–11.5)

## 2021-06-26 PROCEDURE — 6360000002 HC RX W HCPCS: Performed by: HOSPITALIST

## 2021-06-26 PROCEDURE — 99284 EMERGENCY DEPT VISIT MOD MDM: CPT

## 2021-06-26 PROCEDURE — 93005 ELECTROCARDIOGRAM TRACING: CPT | Performed by: NURSE PRACTITIONER

## 2021-06-26 PROCEDURE — 6370000000 HC RX 637 (ALT 250 FOR IP): Performed by: HOSPITALIST

## 2021-06-26 PROCEDURE — 73110 X-RAY EXAM OF WRIST: CPT

## 2021-06-26 PROCEDURE — 85025 COMPLETE CBC W/AUTO DIFF WBC: CPT

## 2021-06-26 PROCEDURE — 2580000003 HC RX 258: Performed by: HOSPITALIST

## 2021-06-26 PROCEDURE — 80053 COMPREHEN METABOLIC PANEL: CPT

## 2021-06-26 PROCEDURE — 2060000000 HC ICU INTERMEDIATE R&B

## 2021-06-26 PROCEDURE — 84484 ASSAY OF TROPONIN QUANT: CPT

## 2021-06-26 PROCEDURE — 93010 ELECTROCARDIOGRAM REPORT: CPT | Performed by: INTERNAL MEDICINE

## 2021-06-26 PROCEDURE — 71046 X-RAY EXAM CHEST 2 VIEWS: CPT

## 2021-06-26 PROCEDURE — 87070 CULTURE OTHR SPECIMN AEROBIC: CPT

## 2021-06-26 PROCEDURE — 87206 SMEAR FLUORESCENT/ACID STAI: CPT

## 2021-06-26 PROCEDURE — 99223 1ST HOSP IP/OBS HIGH 75: CPT | Performed by: INTERNAL MEDICINE

## 2021-06-26 RX ORDER — ACETAMINOPHEN 325 MG/1
650 TABLET ORAL EVERY 6 HOURS PRN
Status: DISCONTINUED | OUTPATIENT
Start: 2021-06-26 | End: 2021-06-30 | Stop reason: HOSPADM

## 2021-06-26 RX ORDER — AZITHROMYCIN 250 MG/1
500 TABLET, FILM COATED ORAL EVERY 24 HOURS
Status: COMPLETED | OUTPATIENT
Start: 2021-06-26 | End: 2021-06-28

## 2021-06-26 RX ORDER — ACETAMINOPHEN 650 MG/1
650 SUPPOSITORY RECTAL EVERY 6 HOURS PRN
Status: DISCONTINUED | OUTPATIENT
Start: 2021-06-26 | End: 2021-06-30 | Stop reason: HOSPADM

## 2021-06-26 RX ORDER — POLYETHYLENE GLYCOL 3350 17 G/17G
17 POWDER, FOR SOLUTION ORAL DAILY PRN
Status: DISCONTINUED | OUTPATIENT
Start: 2021-06-26 | End: 2021-06-30 | Stop reason: HOSPADM

## 2021-06-26 RX ORDER — SODIUM CHLORIDE 9 MG/ML
25 INJECTION, SOLUTION INTRAVENOUS PRN
Status: DISCONTINUED | OUTPATIENT
Start: 2021-06-26 | End: 2021-06-30 | Stop reason: HOSPADM

## 2021-06-26 RX ORDER — SODIUM CHLORIDE 0.9 % (FLUSH) 0.9 %
5-40 SYRINGE (ML) INJECTION EVERY 12 HOURS SCHEDULED
Status: DISCONTINUED | OUTPATIENT
Start: 2021-06-26 | End: 2021-06-30 | Stop reason: HOSPADM

## 2021-06-26 RX ORDER — SODIUM CHLORIDE 0.9 % (FLUSH) 0.9 %
5-40 SYRINGE (ML) INJECTION PRN
Status: DISCONTINUED | OUTPATIENT
Start: 2021-06-26 | End: 2021-06-30 | Stop reason: HOSPADM

## 2021-06-26 RX ORDER — ONDANSETRON 4 MG/1
4 TABLET, ORALLY DISINTEGRATING ORAL EVERY 8 HOURS PRN
Status: DISCONTINUED | OUTPATIENT
Start: 2021-06-26 | End: 2021-06-30 | Stop reason: HOSPADM

## 2021-06-26 RX ORDER — ONDANSETRON 2 MG/ML
4 INJECTION INTRAMUSCULAR; INTRAVENOUS EVERY 6 HOURS PRN
Status: DISCONTINUED | OUTPATIENT
Start: 2021-06-26 | End: 2021-06-30 | Stop reason: HOSPADM

## 2021-06-26 RX ADMIN — Medication 10 ML: at 23:13

## 2021-06-26 RX ADMIN — CEFTRIAXONE 1000 MG: 1 INJECTION, POWDER, FOR SOLUTION INTRAMUSCULAR; INTRAVENOUS at 23:13

## 2021-06-26 RX ADMIN — AZITHROMYCIN 500 MG: 250 TABLET, FILM COATED ORAL at 18:48

## 2021-06-26 RX ADMIN — ENOXAPARIN SODIUM 40 MG: 40 INJECTION SUBCUTANEOUS at 18:48

## 2021-06-26 ASSESSMENT — PAIN SCALES - GENERAL: PAINLEVEL_OUTOF10: 0

## 2021-06-26 ASSESSMENT — ENCOUNTER SYMPTOMS: COUGH: 1
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
## 2021-06-26 NOTE — ED NOTES
Bed:  HA  Expected date:   Expected time:   Means of arrival:   Comments:  triage     Claritza De La Garza RN  06/26/21 0304
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
## 2021-06-27 LAB
ALBUMIN SERPL-MCNC: 4.4 G/DL (ref 3.5–5.2)
ALP BLD-CCNC: 82 U/L (ref 40–129)
ALT SERPL-CCNC: 15 U/L (ref 0–40)
ANION GAP SERPL CALCULATED.3IONS-SCNC: 10 MMOL/L (ref 7–16)
ANISOCYTOSIS: ABNORMAL
AST SERPL-CCNC: 20 U/L (ref 0–39)
BASOPHILS ABSOLUTE: 0.01 E9/L (ref 0–0.2)
BASOPHILS RELATIVE PERCENT: 0.5 % (ref 0–2)
BILIRUB SERPL-MCNC: 0.3 MG/DL (ref 0–1.2)
BUN BLDV-MCNC: 9 MG/DL (ref 6–20)
CALCIUM SERPL-MCNC: 9.8 MG/DL (ref 8.6–10.2)
CHLORIDE BLD-SCNC: 104 MMOL/L (ref 98–107)
CO2: 24 MMOL/L (ref 22–29)
CREAT SERPL-MCNC: 0.8 MG/DL (ref 0.7–1.2)
EOSINOPHILS ABSOLUTE: 0.06 E9/L (ref 0.05–0.5)
EOSINOPHILS RELATIVE PERCENT: 2.8 % (ref 0–6)
GFR AFRICAN AMERICAN: >60
GFR NON-AFRICAN AMERICAN: >60 ML/MIN/1.73
GLUCOSE BLD-MCNC: 78 MG/DL (ref 74–99)
HCT VFR BLD CALC: 38.2 % (ref 37–54)
HEMOGLOBIN: 12.8 G/DL (ref 12.5–16.5)
IMMATURE GRANULOCYTES #: 0.04 E9/L
IMMATURE GRANULOCYTES %: 1.9 % (ref 0–5)
LYMPHOCYTES ABSOLUTE: 0.57 E9/L (ref 1.5–4)
LYMPHOCYTES RELATIVE PERCENT: 26.5 % (ref 20–42)
MCH RBC QN AUTO: 30.9 PG (ref 26–35)
MCHC RBC AUTO-ENTMCNC: 33.5 % (ref 32–34.5)
MCV RBC AUTO: 92.3 FL (ref 80–99.9)
MONOCYTES ABSOLUTE: 0.19 E9/L (ref 0.1–0.95)
MONOCYTES RELATIVE PERCENT: 8.8 % (ref 2–12)
NEUTROPHILS ABSOLUTE: 1.28 E9/L (ref 1.8–7.3)
NEUTROPHILS RELATIVE PERCENT: 59.5 % (ref 43–80)
OVALOCYTES: ABNORMAL
PDW BLD-RTO: 13.3 FL (ref 11.5–15)
PLATELET # BLD: 167 E9/L (ref 130–450)
PMV BLD AUTO: 10.1 FL (ref 7–12)
POIKILOCYTES: ABNORMAL
POTASSIUM REFLEX MAGNESIUM: 4.1 MMOL/L (ref 3.5–5)
RBC # BLD: 4.14 E12/L (ref 3.8–5.8)
SODIUM BLD-SCNC: 138 MMOL/L (ref 132–146)
TOTAL PROTEIN: 7.6 G/DL (ref 6.4–8.3)
WBC # BLD: 2.2 E9/L (ref 4.5–11.5)

## 2021-06-27 PROCEDURE — 6360000002 HC RX W HCPCS: Performed by: HOSPITALIST

## 2021-06-27 PROCEDURE — 85025 COMPLETE CBC W/AUTO DIFF WBC: CPT

## 2021-06-27 PROCEDURE — 6370000000 HC RX 637 (ALT 250 FOR IP): Performed by: HOSPITALIST

## 2021-06-27 PROCEDURE — 2580000003 HC RX 258: Performed by: HOSPITALIST

## 2021-06-27 PROCEDURE — 99233 SBSQ HOSP IP/OBS HIGH 50: CPT | Performed by: INTERNAL MEDICINE

## 2021-06-27 PROCEDURE — 2060000000 HC ICU INTERMEDIATE R&B

## 2021-06-27 PROCEDURE — 99222 1ST HOSP IP/OBS MODERATE 55: CPT | Performed by: THORACIC SURGERY (CARDIOTHORACIC VASCULAR SURGERY)

## 2021-06-27 PROCEDURE — 80053 COMPREHEN METABOLIC PANEL: CPT

## 2021-06-27 PROCEDURE — 36415 COLL VENOUS BLD VENIPUNCTURE: CPT

## 2021-06-27 RX ADMIN — AZITHROMYCIN 500 MG: 250 TABLET, FILM COATED ORAL at 18:22

## 2021-06-27 RX ADMIN — Medication 10 ML: at 20:49

## 2021-06-27 RX ADMIN — Medication 10 ML: at 09:50

## 2021-06-27 RX ADMIN — CEFTRIAXONE 1000 MG: 1 INJECTION, POWDER, FOR SOLUTION INTRAMUSCULAR; INTRAVENOUS at 20:47

## 2021-06-27 ASSESSMENT — PAIN SCALES - GENERAL: PAINLEVEL_OUTOF10: 0

## 2021-06-27 NOTE — CONSULTS
Past Surgical History:  History reviewed. No pertinent surgical history. Social History:  Social History     Socioeconomic History    Marital status: Single     Spouse name: Not on file    Number of children: Not on file    Years of education: Not on file    Highest education level: Not on file   Occupational History    Not on file   Tobacco Use    Smoking status: Current Every Day Smoker     Packs/day: 0.25     Types: Cigarettes    Smokeless tobacco: Never Used   Vaping Use    Vaping Use: Never used   Substance and Sexual Activity    Alcohol use: Not Currently    Drug use: Never    Sexual activity: Not on file   Other Topics Concern    Not on file   Social History Narrative    Not on file     Social Determinants of Health     Financial Resource Strain:     Difficulty of Paying Living Expenses:    Food Insecurity:     Worried About Running Out of Food in the Last Year:     Ran Out of Food in the Last Year:    Transportation Needs:     Lack of Transportation (Medical):  Lack of Transportation (Non-Medical):    Physical Activity:     Days of Exercise per Week:     Minutes of Exercise per Session:    Stress:     Feeling of Stress :    Social Connections:     Frequency of Communication with Friends and Family:     Frequency of Social Gatherings with Friends and Family:     Attends Sikhism Services:     Active Member of Clubs or Organizations:     Attends Club or Organization Meetings:     Marital Status:    Intimate Partner Violence:     Fear of Current or Ex-Partner:     Emotionally Abused:     Physically Abused:     Sexually Abused:        Family History:  History reviewed. No pertinent family history. Review of Systems:  Constitutional: Denies fevers, chills, or weight loss. HEENT: Denies visual changes or hearing loss. Heart: As per HPI. Lungs: Denies shortness of breath, cough, or wheezing. Gastrointestinal: Denies nausea, vomiting, constipation, or diarrhea. Genitourinary: dysuria or hematuria. Psychiatric: Patient denies anxiety or depression. Neurologic: Patient denies weakness of the extremities, dizziness, or headaches. All other ROS checked and found to be negative. Labs:  Recent Labs     06/26/21  1249   WBC 2.0*   HGB 12.9   HCT 39.2         Recent Labs     06/26/21  1249   BUN 7   CREATININE 0.8       Objective:  Vitals /74   Pulse 61   Temp 98 °F (36.7 °C) (Temporal)   Resp 16   Ht 5' 11\" (1.803 m)   Wt 130 lb (59 kg)   SpO2 100%   BMI 18.13 kg/m²   General Appearance: Pleasant 23y.o. year old male who appears stated age. Communicates well, no acute distress. HEENT: Head is normocephalic, atraumatic. EOMs intact, PERRL. Trachea midline. Lungs: Normal respiratory rate and normal effort. He is not in respiratory distress. Breath sounds clear to auscultation. No wheezes. Heart: Normal rate. Regular rhythm. S1 normal and S2 normal. Positive for murmur. Chest: Symmetric chest wall expansion. Extremities: Normal range of motion. Neurological: Patient is alert and oriented to person, place and time. Patient has normal reflexes. Skin: Warm and dry. Abdomen: Abdomen is soft and non-distended. Bowel sounds are normal. There is no abdominal tenderness tenderness. There is no guarding. There is no mass. Pulses: Distal pulses are intact. Skin: Warm and dry without lesions. Assessment: Lung abscess        Plan: I agree he needs a bronch to start. We will also get PFTs on him and we will follow along with you. He maybe headed towards a lobectomy.     Electronically signed by Alejandra Norton MD on 6/27/2021 at 9:57 AM
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
## 2021-06-27 NOTE — PROGRESS NOTES
HOSPITALIST PROGRESS NOTE  Date: 6/27/2021   Name: Glenn Gann   MRN: [id number]   YOB: 2000        Hospital Course:   Mr Joseph Robert is 23 YM who presented to the emergency department due to hemoptysis and fatigue. Patient had been previously admitted to the hospital on 06/01/2021 for cavitary lung lesion was seen by pulmonology treated with antibiotics and the lesion improved to fluid-filled lesion. At this time patient stated that 3 days ago he started coughing up and spit not blood again and is complaining of fatigue. Patient was seen in the emergency department by pulmonology who encourage patient be admitted to [facility] service with pulmonary in consultation for evaluation and treatment of cavitary lung lesions with hemoptysis    Subjective/Interval Hx:   Patient complains of abdominal tenderness from the Lovenox injection explained the need for it at this time.   Will consult Ortho to evaluate right radial fracture with cast n.p.o. at this time    Objective:   Physical Exam:   /74   Pulse 61   Temp 98 °F (36.7 °C) (Temporal)   Resp 16   Ht 5' 11\" (1.803 m)   Wt 130 lb (59 kg)   SpO2 100%   BMI 18.13 kg/m²   General: no acute distress, well nourished and well hydrated  HEENT: NCAT  Heart: S1S2 RRR  Lungs: Clear to ascultation bilaterally, respiratory effort normal  Abdomen: soft, NT/ND, positive bowel sounds  Extremities: no pitting edema, nontender   Neuro: patient is awake, alert and orientated times 3, no gross deficits  Skin: no rashes or ecchymosis        Meds:   Meds:    sodium chloride flush  5-40 mL Intravenous 2 times per day    [Held by provider] enoxaparin  40 mg Subcutaneous Daily    cefTRIAXone (ROCEPHIN) IV  1,000 mg Intravenous Q24H    And    azithromycin  500 mg Oral Q24H      Infusions:    sodium chloride       PRN Meds: sodium chloride flush, 5-40 mL, PRN  sodium chloride, 25 mL, PRN  ondansetron, 4 mg, Q8H PRN   Or  ondansetron, 4 mg, Q6H PRN  polyethylene glycol, 17 g, Daily PRN  acetaminophen, 650 mg, Q6H PRN   Or  acetaminophen, 650 mg, Q6H PRN        Data/Labs:     Recent Labs     06/26/21  1249 06/27/21  0922   WBC 2.0* 2.2*   HGB 12.9 12.8   HCT 39.2 38.2    167      Recent Labs     06/26/21  1249 06/27/21  0922    138   K 4.4 4.1    104   CO2 25 24   BUN 7 9   CREATININE 0.8 0.8     Recent Labs     06/26/21  1249 06/27/21  0922   AST 21 20   ALT 15 15   BILITOT 0.4 0.3   ALKPHOS 81 82     No results for input(s): INR in the last 72 hours. No results for input(s): CKTOTAL, CKMB, CKMBINDEX, TROPONINT in the last 72 hours. No intake/output data recorded. No intake or output data in the 24 hours ending 06/27/21 1052     Assessment/Plan:   1. Cavitary lung lesions with recurrent hemoptysis-patient was seen by pulmonary in the emergency department encouraged to be admitted so they can evaluate and possibly do a bronch or cautery what ever is indicated  06/27/2021 thoracic surgery has been consulted by pulmonary for possible surgery we will get PFTs at this time we will continue to monitor  2. Tobacco dependency-patient may have a nicotine patch if desired,  3. Right Radial Fracture-due to fall approximately 4 weeks ago patient heads a splint placed in the emergency department consult Ortho for evaluation for possible removal.    DVT Prophylaxis: Lovenox  Diet: Diet NPO  Code Status: Full Code    Dispo:  When stable    Electronically signed by Natalie Schreiber MD on 6/27/2021 at 10:52 AM  [facility]
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
## 2021-06-28 ENCOUNTER — APPOINTMENT (OUTPATIENT)
Dept: GENERAL RADIOLOGY | Age: 21
DRG: 137 | End: 2021-06-28
Payer: MEDICAID

## 2021-06-28 ENCOUNTER — ANESTHESIA EVENT (OUTPATIENT)
Dept: ENDOSCOPY | Age: 21
DRG: 137 | End: 2021-06-28
Payer: MEDICAID

## 2021-06-28 ENCOUNTER — ANESTHESIA (OUTPATIENT)
Dept: ENDOSCOPY | Age: 21
DRG: 137 | End: 2021-06-28
Payer: MEDICAID

## 2021-06-28 VITALS — SYSTOLIC BLOOD PRESSURE: 121 MMHG | DIASTOLIC BLOOD PRESSURE: 89 MMHG | OXYGEN SATURATION: 99 % | TEMPERATURE: 98.6 F

## 2021-06-28 LAB
CULTURE, RESPIRATORY: NORMAL
SMEAR, RESPIRATORY: NORMAL

## 2021-06-28 PROCEDURE — 87205 SMEAR GRAM STAIN: CPT

## 2021-06-28 PROCEDURE — 6360000002 HC RX W HCPCS

## 2021-06-28 PROCEDURE — 94375 RESPIRATORY FLOW VOLUME LOOP: CPT

## 2021-06-28 PROCEDURE — 2580000003 HC RX 258

## 2021-06-28 PROCEDURE — 87116 MYCOBACTERIA CULTURE: CPT

## 2021-06-28 PROCEDURE — 89051 BODY FLUID CELL COUNT: CPT

## 2021-06-28 PROCEDURE — 0B9F8ZX DRAINAGE OF RIGHT LOWER LUNG LOBE, VIA NATURAL OR ARTIFICIAL OPENING ENDOSCOPIC, DIAGNOSTIC: ICD-10-PCS | Performed by: INTERNAL MEDICINE

## 2021-06-28 PROCEDURE — 6360000002 HC RX W HCPCS: Performed by: HOSPITALIST

## 2021-06-28 PROCEDURE — 2500000003 HC RX 250 WO HCPCS

## 2021-06-28 PROCEDURE — 2580000003 HC RX 258: Performed by: HOSPITALIST

## 2021-06-28 PROCEDURE — 88112 CYTOPATH CELL ENHANCE TECH: CPT

## 2021-06-28 PROCEDURE — 7100000000 HC PACU RECOVERY - FIRST 15 MIN: Performed by: INTERNAL MEDICINE

## 2021-06-28 PROCEDURE — 87206 SMEAR FLUORESCENT/ACID STAI: CPT

## 2021-06-28 PROCEDURE — 7100000001 HC PACU RECOVERY - ADDTL 15 MIN: Performed by: INTERNAL MEDICINE

## 2021-06-28 PROCEDURE — 3700000000 HC ANESTHESIA ATTENDED CARE: Performed by: INTERNAL MEDICINE

## 2021-06-28 PROCEDURE — 2060000000 HC ICU INTERMEDIATE R&B

## 2021-06-28 PROCEDURE — 87070 CULTURE OTHR SPECIMN AEROBIC: CPT

## 2021-06-28 PROCEDURE — 87102 FUNGUS ISOLATION CULTURE: CPT

## 2021-06-28 PROCEDURE — 3700000001 HC ADD 15 MINUTES (ANESTHESIA): Performed by: INTERNAL MEDICINE

## 2021-06-28 PROCEDURE — 71045 X-RAY EXAM CHEST 1 VIEW: CPT

## 2021-06-28 PROCEDURE — 3609010800 HC BRONCHOSCOPY ALVEOLAR LAVAGE: Performed by: INTERNAL MEDICINE

## 2021-06-28 PROCEDURE — 6370000000 HC RX 637 (ALT 250 FOR IP): Performed by: HOSPITALIST

## 2021-06-28 PROCEDURE — 94729 DIFFUSING CAPACITY: CPT

## 2021-06-28 PROCEDURE — 87015 SPECIMEN INFECT AGNT CONCNTJ: CPT

## 2021-06-28 PROCEDURE — 2709999900 HC NON-CHARGEABLE SUPPLY: Performed by: INTERNAL MEDICINE

## 2021-06-28 PROCEDURE — 31624 DX BRONCHOSCOPE/LAVAGE: CPT | Performed by: INTERNAL MEDICINE

## 2021-06-28 RX ORDER — PROMETHAZINE HYDROCHLORIDE 25 MG/ML
6.25 INJECTION, SOLUTION INTRAMUSCULAR; INTRAVENOUS
Status: DISCONTINUED | OUTPATIENT
Start: 2021-06-28 | End: 2021-06-30 | Stop reason: HOSPADM

## 2021-06-28 RX ORDER — MEPERIDINE HYDROCHLORIDE 25 MG/ML
12.5 INJECTION INTRAMUSCULAR; INTRAVENOUS; SUBCUTANEOUS EVERY 5 MIN PRN
Status: DISCONTINUED | OUTPATIENT
Start: 2021-06-28 | End: 2021-06-30 | Stop reason: HOSPADM

## 2021-06-28 RX ORDER — PROPOFOL 10 MG/ML
INJECTION, EMULSION INTRAVENOUS PRN
Status: DISCONTINUED | OUTPATIENT
Start: 2021-06-28 | End: 2021-06-28 | Stop reason: SDUPTHER

## 2021-06-28 RX ORDER — ONDANSETRON 2 MG/ML
INJECTION INTRAMUSCULAR; INTRAVENOUS PRN
Status: DISCONTINUED | OUTPATIENT
Start: 2021-06-28 | End: 2021-06-28 | Stop reason: SDUPTHER

## 2021-06-28 RX ORDER — FENTANYL CITRATE 50 UG/ML
INJECTION, SOLUTION INTRAMUSCULAR; INTRAVENOUS PRN
Status: DISCONTINUED | OUTPATIENT
Start: 2021-06-28 | End: 2021-06-28 | Stop reason: SDUPTHER

## 2021-06-28 RX ORDER — OXYCODONE HYDROCHLORIDE AND ACETAMINOPHEN 5; 325 MG/1; MG/1
1 TABLET ORAL
Status: ACTIVE | OUTPATIENT
Start: 2021-06-28 | End: 2021-06-28

## 2021-06-28 RX ORDER — SUCCINYLCHOLINE/SOD CL,ISO/PF 200MG/10ML
SYRINGE (ML) INTRAVENOUS PRN
Status: DISCONTINUED | OUTPATIENT
Start: 2021-06-28 | End: 2021-06-28 | Stop reason: SDUPTHER

## 2021-06-28 RX ORDER — FENTANYL CITRATE 50 UG/ML
25 INJECTION, SOLUTION INTRAMUSCULAR; INTRAVENOUS EVERY 5 MIN PRN
Status: DISCONTINUED | OUTPATIENT
Start: 2021-06-28 | End: 2021-06-30 | Stop reason: HOSPADM

## 2021-06-28 RX ORDER — LIDOCAINE HYDROCHLORIDE 20 MG/ML
INJECTION, SOLUTION INTRAVENOUS PRN
Status: DISCONTINUED | OUTPATIENT
Start: 2021-06-28 | End: 2021-06-28 | Stop reason: SDUPTHER

## 2021-06-28 RX ORDER — DEXAMETHASONE SODIUM PHOSPHATE 10 MG/ML
INJECTION, SOLUTION INTRAMUSCULAR; INTRAVENOUS PRN
Status: DISCONTINUED | OUTPATIENT
Start: 2021-06-28 | End: 2021-06-28 | Stop reason: SDUPTHER

## 2021-06-28 RX ORDER — SODIUM CHLORIDE 9 MG/ML
INJECTION, SOLUTION INTRAVENOUS CONTINUOUS PRN
Status: DISCONTINUED | OUTPATIENT
Start: 2021-06-28 | End: 2021-06-28 | Stop reason: SDUPTHER

## 2021-06-28 RX ORDER — DIPHENHYDRAMINE HYDROCHLORIDE 50 MG/ML
12.5 INJECTION INTRAMUSCULAR; INTRAVENOUS
Status: ACTIVE | OUTPATIENT
Start: 2021-06-28 | End: 2021-06-28

## 2021-06-28 RX ORDER — FENTANYL CITRATE 50 UG/ML
50 INJECTION, SOLUTION INTRAMUSCULAR; INTRAVENOUS EVERY 5 MIN PRN
Status: DISCONTINUED | OUTPATIENT
Start: 2021-06-28 | End: 2021-06-30 | Stop reason: HOSPADM

## 2021-06-28 RX ADMIN — Medication 140 MG: at 11:43

## 2021-06-28 RX ADMIN — PROPOFOL 200 MG: 10 INJECTION, EMULSION INTRAVENOUS at 11:43

## 2021-06-28 RX ADMIN — SODIUM CHLORIDE: 9 INJECTION, SOLUTION INTRAVENOUS at 11:32

## 2021-06-28 RX ADMIN — CEFTRIAXONE 1000 MG: 1 INJECTION, POWDER, FOR SOLUTION INTRAMUSCULAR; INTRAVENOUS at 22:03

## 2021-06-28 RX ADMIN — Medication 10 ML: at 07:55

## 2021-06-28 RX ADMIN — PROPOFOL 150 MCG/KG/MIN: 10 INJECTION, EMULSION INTRAVENOUS at 11:44

## 2021-06-28 RX ADMIN — FENTANYL CITRATE 100 MCG: 50 INJECTION, SOLUTION INTRAMUSCULAR; INTRAVENOUS at 11:43

## 2021-06-28 RX ADMIN — Medication 40 ML: at 22:04

## 2021-06-28 RX ADMIN — LIDOCAINE HYDROCHLORIDE 100 MG: 20 INJECTION, SOLUTION INTRAVENOUS at 11:43

## 2021-06-28 RX ADMIN — AZITHROMYCIN 500 MG: 250 TABLET, FILM COATED ORAL at 18:13

## 2021-06-28 RX ADMIN — ONDANSETRON HYDROCHLORIDE 4 MG: 2 INJECTION, SOLUTION INTRAMUSCULAR; INTRAVENOUS at 11:55

## 2021-06-28 RX ADMIN — DEXAMETHASONE SODIUM PHOSPHATE 10 MG: 10 INJECTION INTRAMUSCULAR; INTRAVENOUS at 11:55

## 2021-06-28 ASSESSMENT — PAIN SCALES - GENERAL
PAINLEVEL_OUTOF10: 0

## 2021-06-28 ASSESSMENT — PULMONARY FUNCTION TESTS
PIF_VALUE: 1
PIF_VALUE: 1
PIF_VALUE: 36
PIF_VALUE: 22
PIF_VALUE: 16
PIF_VALUE: 22
PIF_VALUE: 17
PIF_VALUE: 16
PIF_VALUE: 1
PIF_VALUE: 3
PIF_VALUE: 1
PIF_VALUE: 19
PIF_VALUE: 16
PIF_VALUE: 1
PIF_VALUE: 18
PIF_VALUE: 27
PIF_VALUE: 1
PIF_VALUE: 2
PIF_VALUE: 23
PIF_VALUE: 0
PIF_VALUE: 16
PIF_VALUE: 27
PIF_VALUE: 1
PIF_VALUE: 2
PIF_VALUE: 16
PIF_VALUE: 3
PIF_VALUE: 0
PIF_VALUE: 28
PIF_VALUE: 1
PIF_VALUE: 2
PIF_VALUE: 27

## 2021-06-28 ASSESSMENT — LIFESTYLE VARIABLES: SMOKING_STATUS: 1

## 2021-06-28 NOTE — ANESTHESIA PRE PROCEDURE
Department of Anesthesiology  Preprocedure Note       Name:  Kim Cooper   Age:  23 y.o.  :  2000                                          MRN:  [id number]         Date:  2021      Surgeon: Ventura Dunne):  Miles Reyna MD    Procedure: Procedure(s):  BRONCHOSCOPY    Medications prior to admission:   Prior to Admission medications    Not on File       Current medications:    Current Facility-Administered Medications   Medication Dose Route Frequency Provider Last Rate Last Admin    sodium chloride flush 0.9 % injection 5-40 mL  5-40 mL Intravenous 2 times per day Yarelis Mckeon MD   10 mL at 21    sodium chloride flush 0.9 % injection 5-40 mL  5-40 mL Intravenous PRN Yarelis Mckeon MD        0.9 % sodium chloride infusion  25 mL Intravenous PRN Yarelis Mckeon MD        [Held by provider] enoxaparin (LOVENOX) injection 40 mg  40 mg Subcutaneous Daily Yarelis Mckeon MD   40 mg at 21 184    ondansetron (ZOFRAN-ODT) disintegrating tablet 4 mg  4 mg Oral Q8H PRN Yarelis Mckeon MD        Or    ondansetron (ZOFRAN) injection 4 mg  4 mg Intravenous Q6H PRN Yarelis Mckeon MD        polyethylene glycol (GLYCOLAX) packet 17 g  17 g Oral Daily PRN Yarelis Mckeon MD        acetaminophen (TYLENOL) tablet 650 mg  650 mg Oral Q6H PRN Yarelis Mckeon MD        Or    acetaminophen (TYLENOL) suppository 650 mg  650 mg Rectal Q6H PRN Yarelis Mckeon MD        cefTRIAXone (ROCEPHIN) 1,000 mg in sterile water 10 mL IV syringe  1,000 mg Intravenous Q24H Yarelis Mckeon MD   1,000 mg at 21    And    azithromycin (ZITHROMAX) tablet 500 mg  500 mg Oral Q24H Yarelis Mckeon MD   500 mg at 21 9630       Allergies:  No Known Allergies    Problem List:    Patient Active Problem List   Diagnosis Code    Cavitary lesion of lung J98.4    Pulmonary cavitary lesion J98.4       Past Medical History:        Diagnosis Date    Broken wrist, right, closed, initial encounter     Heart murmur Applicable): No results found for: PREGTESTUR, PREGSERUM, HCG, HCGQUANT     ABGs: No results found for: PHART, PO2ART, DWO7URK, COX2VZH, BEART, N6KGUDWU     Type & Screen (If Applicable):  No results found for: LABABO, LABRH    Drug/Infectious Status (If Applicable):  No results found for: HIV, HEPCAB    COVID-19 Screening (If Applicable):   Lab Results   Component Value Date    COVID19 Not Detected 06/01/2021       EKG 6/26/21  Sinus bradycardia  Rightward axis  Nonspecific intraventricular conduction delay  Early repolarization  Borderline ECG  When compared with ECG of 05-AUG-2020 06:05,  No significant change was found  Confirmed by Rissa Us ([id number]) on 6/26/2021 2:24:41 PM        Anesthesia Evaluation  Patient summary reviewed and Nursing notes reviewed no history of anesthetic complications:   Airway: Mallampati: I  TM distance: >3 FB   Neck ROM: full  Mouth opening: > = 3 FB Dental:      Comment: Denies missing or lose teeth     Pulmonary:   (+) current smoker ( nicotine and marijuana )          Patient smoked on day of surgery. ROS comment: Cavitary lesion of lung  Pulmonary cavitary lesion       Cardiovascular:  Exercise tolerance: good (>4 METS),       Valvular problems/murmurs:  hx of heart murmur. ECG reviewed               Beta Blocker:  Not on Beta Blocker         Neuro/Psych:   Negative Neuro/Psych ROS              GI/Hepatic/Renal: Neg GI/Hepatic/Renal ROS            Endo/Other: Negative Endo/Other ROS                    Abdominal:             Vascular: negative vascular ROS. Other Findings:           Anesthesia Plan      general and MAC     ASA 2       Induction: intravenous. MIPS: Prophylactic antiemetics administered. Anesthetic plan and risks discussed with patient. Use of blood products discussed with patient whom consented to blood products. Plan discussed with CRNA and attending.                   Lola Burns RN   6/28/2021

## 2021-06-28 NOTE — CARE COORDINATION
Met with pt and mom along with 2 other family members in the room. Nothing has changed since pt was here early this month with home. Pt is currently on IV rocephin q24 and po zithromax daily. Pt went for a bronch today, await cultures. Plan is home at discharge. Will need an appointment with internal medicine clinical for a family doctor at discharge. Family to transport home. Await pulmonary recommendations for discharge needs. Milan Eldridge, MSW, LSW

## 2021-06-28 NOTE — PLAN OF CARE
Problem: Bleeding:  Goal: Will show no signs and symptoms of excessive bleeding  Description: Will show no signs and symptoms of excessive bleeding  Outcome: Met This Shift     Problem: Pain:  Goal: Pain level will decrease  Description: Pain level will decrease  Outcome: Met This Shift 11-Jan-2017

## 2021-06-28 NOTE — OP NOTE
Operative Note    [facility] PROCEDURE NOTE    Patient: Primitivo Johnson  YOB: 2000  MRN: [id number]    Date of Procedure: 6/28/2021    Pre-Op Diagnosis: hemoptysis     Post-Op Diagnosis: Same       Procedure(s):  BRONCHOSCOPY ALVEOLAR LAVAGE    Surgeon(s):  Rory Hallman MD    Assistant:   * No surgical staff found *           ANESTHESIOLOGIST:  Per EPIC Note    SPECIMENS:  [x] Bronchial sample(s) for      Fungal smear & culture,   Acid-fast bacillus Smear and Culture,    Gram stain, C&S,    PCP               Cytology               BD glucan              Galactomanin      Description of Procedure: The [facility]  and the procedure verified as Flexible Fiberoptic Bronchoscopy with BAL       After the patient was controlled with sedation bronchoscope was introduced through ET  without difficulty. The scope was then passed into the trachea. Additional 2% lidocaine was used topically within the airway. Careful inspection of the tracheal lumen was accomplished. The scope was sequentially passed into all bronchial airways. Endobronchial findings:      Trachea:  Normal mucosa, he the part seen outside the ET tube  Sonia  Normal mucosa     Right Main Stem Bronchus  Normal mucosa  Right Upper Lobe Bronchi Normal mucosa  Right Middle Lobe Bronchi  Normal mucosa  Right Lower Lobe Bronchi (including the Superior segment)  Normal mucosa,trace of old blood in the lateral segment and medial segments of RLL      Left Main Stem Bronchus Normal mucosa  Left Upper Lobe Bronchus, Upper Division Normal mucosa  Left Upper Lobe Bronchus, Lingula  Normal mucosa  Left Lower Lobe Bronchus (including the Superior segment)             BAL :RLL     COMPLICATIONS:  Estimated blood loss less than 0 cc     IMPRESSIONS:       RECOMMENDATIONS:   The Patient was taken to the recovery area in satisfactory condition.   Await final test/sample results. Rosie Villegas MD          Specimens:   ID Type Source Tests Collected by Time Destination   1 : Bronch BAL #1 RLL  Respiratory BAL- Bronch. Lavage BODY FLUID CELL COUNT WITH DIFFERENTIAL, CULTURE, FUNGUS, GRAM STAIN, CULTURE WITH SMEAR, ACID FAST BACILLIUS, CULTURE, RESPIRATORY Rosie Villegas MD 6/28/2021 1155    2 : Bronch BAL#2 RLL Respiratory BAL- Bronch. Lavage GRAM STAIN Rosie Villegas MD 6/28/2021 1156    A : Bronch BAL RLL Respiratory BAL- Bronch.  Lavage CYTOLOGY, NON-GYN Rosie Villegas MD 6/28/2021 1153        Electronically signed by Indu Watson MD on 6/28/2021 at 12:08 PM

## 2021-06-28 NOTE — PLAN OF CARE
Problem: Bleeding:  Goal: Will show no signs and symptoms of excessive bleeding  Description: Will show no signs and symptoms of excessive bleeding  Outcome: Met This Shift  Goal: Absence of active bleeding  Outcome: Met This Shift     Problem: Pain:  Goal: Pain level will decrease  Description: Pain level will decrease  Outcome: Met This Shift  Goal: Control of acute pain  Description: Control of acute pain  Outcome: Met This Shift  Goal: Control of chronic pain  Description: Control of chronic pain  Outcome: Met This Shift

## 2021-06-28 NOTE — ANESTHESIA POSTPROCEDURE EVALUATION
Department of Anesthesiology  Postprocedure Note    Patient: Elena Becerra  MRN: [id number]  [place]: 2000  Date of evaluation: 6/28/2021  Time:  1:02 PM     Procedure Summary     Date: 06/28/21 Room / Location: [facility] 03 / [facility]    Anesthesia Start: 1132 Anesthesia Stop: 2442    Procedure: BRONCHOSCOPY ALVEOLAR LAVAGE (N/A ) Diagnosis: (?)    Surgeons: Jae Lopez MD Responsible Provider: Isabel Duron MD    Anesthesia Type: general, MAC ASA Status: 2          Anesthesia Type: general, MAC    Tiago Phase I: Tiago Score: 8    Tiago Phase II:      Last vitals: Reviewed and per EMR flowsheets.        Anesthesia Post Evaluation    Patient location during evaluation: PACU  Patient participation: complete - patient participated  Level of consciousness: awake  Airway patency: patent  Nausea & Vomiting: no vomiting and no nausea  Complications: no  Cardiovascular status: hemodynamically stable  Respiratory status: acceptable  Hydration status: stable

## 2021-06-28 NOTE — PROGRESS NOTES
HOSPITALIST PROGRESS NOTE  Date: 6/28/2021   Name: Linzy Kocher   MRN: [id number]   YOB: 2000        Hospital Course:   Mr Belinda Mendoza is 23 YM who presented to the emergency department due to hemoptysis and fatigue. Patient had been previously admitted to the hospital on 06/01/2021 for cavitary lung lesion was seen by pulmonology treated with antibiotics and the lesion improved to fluid-filled lesion. At this time patient stated that 3 days ago he started coughing up and spit not blood again and is complaining of fatigue.  Patient was seen in the emergency department by pulmonology who encourage patient be admitted to [facility] service with pulmonary in consultation for evaluation and treatment of cavitary lung lesions with hemoptysis    Subjective/Interval Hx:   Patient returned from procedure; stable    Objective:   Physical Exam:   /71   Pulse 59   Temp 97.5 °F (36.4 °C) (Temporal)   Resp 18   Ht 5' 11\" (1.803 m)   Wt 130 lb (59 kg)   SpO2 99%   BMI 18.13 kg/m²   General: no acute distress, well nourished and well hydrated  HEENT: NCAT  Heart: S1S2 RRR  Lungs: Clear to ascultation bilaterally, respiratory effort normal  Abdomen: soft, NT/ND, positive bowel sounds  Extremities: no pitting edema, nontender   Neuro: patient is awake, alert and orientated times 3, no gross deficits  Skin: no rashes or ecchymosis        Meds:   Meds:    sodium chloride flush  5-40 mL Intravenous 2 times per day    [Held by provider] enoxaparin  40 mg Subcutaneous Daily    cefTRIAXone (ROCEPHIN) IV  1,000 mg Intravenous Q24H      Infusions:    sodium chloride       PRN Meds: meperidine, 12.5 mg, Q5 Min PRN  fentanNYL, 25 mcg, Q5 Min PRN  fentanNYL, 50 mcg, Q5 Min PRN  HYDROmorphone, 0.25 mg, Q5 Min PRN  HYDROmorphone, 0.5 mg, Q5 Min PRN  oxyCODONE-acetaminophen, 1 tablet, Once PRN  promethazine, 6.25 mg, Q15 Min PRN  diphenhydrAMINE, 12.5 mg, Once PRN  sodium chloride flush, 5-40 mL, PRN  sodium chloride, 25 mL, PRN  ondansetron, 4 mg, Q8H PRN   Or  ondansetron, 4 mg, Q6H PRN  polyethylene glycol, 17 g, Daily PRN  acetaminophen, 650 mg, Q6H PRN   Or  acetaminophen, 650 mg, Q6H PRN        Data/Labs:     Recent Labs     06/26/21  1249 06/27/21  0922   WBC 2.0* 2.2*   HGB 12.9 12.8   HCT 39.2 38.2    167      Recent Labs     06/26/21  1249 06/27/21  0922    138   K 4.4 4.1    104   CO2 25 24   BUN 7 9   CREATININE 0.8 0.8     Recent Labs     06/26/21  1249 06/27/21  0922   AST 21 20   ALT 15 15   BILITOT 0.4 0.3   ALKPHOS 81 82     No results for input(s): INR in the last 72 hours. No results for input(s): CKTOTAL, CKMB, CKMBINDEX, TROPONINT in the last 72 hours. I/O last 3 completed shifts: In: 560 [P.O.:360; I.V.:200]  Out: 0     Intake/Output Summary (Last 24 hours) at 6/28/2021 1836  Last data filed at 6/28/2021 1212  Gross per 24 hour   Intake 560 ml   Output 0 ml   Net 560 ml        Assessment/Plan:   1. Cavitary lung lesions with recurrent hemoptysis-patient was seen by pulmonary in the emergency department encouraged to be admitted so they can evaluate and possibly do a bronch or cautery what ever is indicated  06/27/2021 thoracic surgery has been consulted by pulmonary for possible surgery we will get PFTs at this time we will continue to monitor  06/28/2021- patient returned from procedure- bronch; will await results per pulmonary  2. Tobacco dependency-patient may have a nicotine patch if desired,  3. Right Radial Fracture-due to fall approximately 4 weeks ago patient heads a splint placed in the emergency department consult Ortho for evaluation for possible removal.    DVT Prophylaxis: Lovenox  Diet: ADULT DIET; Regular  Code Status: Full Code    Dispo:  When stable    Electronically signed by Batsheva Krishnamurthy MD on 6/28/2021 at 6:36 PM  [facility] Hospitalist

## 2021-06-29 LAB
APPEARANCE FLUID: CLEAR
BASO FLUID: 0 %
CELL COUNT FLUID TYPE: NORMAL
COLOR FLUID: COLORLESS
EOSINOPHIL FLUID: 0 %
GRAM STAIN ORDERABLE: NORMAL
GRAM STAIN ORDERABLE: NORMAL
LYMPHOCYTES, BODY FLUID: 27 %
MONOCYTE, FLUID: 69 %
NEUTROPHIL, FLUID: 4 %
NUCLEATED CELLS FLUID: 4 /UL
RBC FLUID: <2000 /UL

## 2021-06-29 PROCEDURE — 6360000002 HC RX W HCPCS: Performed by: HOSPITALIST

## 2021-06-29 PROCEDURE — 2580000003 HC RX 258: Performed by: HOSPITALIST

## 2021-06-29 PROCEDURE — 99233 SBSQ HOSP IP/OBS HIGH 50: CPT | Performed by: INTERNAL MEDICINE

## 2021-06-29 PROCEDURE — 99232 SBSQ HOSP IP/OBS MODERATE 35: CPT | Performed by: THORACIC SURGERY (CARDIOTHORACIC VASCULAR SURGERY)

## 2021-06-29 PROCEDURE — 2060000000 HC ICU INTERMEDIATE R&B

## 2021-06-29 RX ADMIN — Medication 40 ML: at 22:10

## 2021-06-29 RX ADMIN — CEFTRIAXONE 1000 MG: 1 INJECTION, POWDER, FOR SOLUTION INTRAMUSCULAR; INTRAVENOUS at 20:43

## 2021-06-29 RX ADMIN — Medication 5 ML: at 08:46

## 2021-06-29 ASSESSMENT — PAIN SCALES - GENERAL
PAINLEVEL_OUTOF10: 0

## 2021-06-29 NOTE — PLAN OF CARE
Problem: Bleeding:  Goal: Will show no signs and symptoms of excessive bleeding  Description: Will show no signs and symptoms of excessive bleeding  6/29/2021 0258 by Jakob Daniel RN  Outcome: Met This Shift     Problem: Bleeding:  Goal: Absence of active bleeding  6/29/2021 0258 by Jakob Daniel RN  Outcome: Met This Shift     Problem: Pain:  Goal: Pain level will decrease  Description: Pain level will decrease  6/29/2021 0258 by Jakob Daniel RN  Outcome: Met This Shift     Problem: Pain:  Goal: Control of acute pain  Description: Control of acute pain  6/29/2021 0258 by Jakob Daniel RN  Outcome: Met This Shift     Problem: Pain:  Goal: Control of chronic pain  Description: Control of chronic pain  6/29/2021 0258 by Jakob Daniel RN  Outcome: Met This Shift

## 2021-06-29 NOTE — PROGRESS NOTES
CC:Lung abscess    Brief HPI:  Patient seen Awake, alert. No complaints. Past Medical History:   Diagnosis Date    Broken wrist, right, closed, initial encounter     Heart murmur      History reviewed. No pertinent surgical history. Social History     Socioeconomic History    Marital status: Single     Spouse name: Not on file    Number of children: Not on file    Years of education: Not on file    Highest education level: Not on file   Occupational History    Not on file   Tobacco Use    Smoking status: Current Every Day Smoker     Packs/day: 0.25     Types: Cigarettes    Smokeless tobacco: Never Used   Vaping Use    Vaping Use: Never used   Substance and Sexual Activity    Alcohol use: Not Currently    Drug use: Never    Sexual activity: Not on file   Other Topics Concern    Not on file   Social History Narrative    Not on file     Social Determinants of Health     Financial Resource Strain:     Difficulty of Paying Living Expenses:    Food Insecurity:     Worried About Running Out of Food in the Last Year:     [address] in the Last Year:    Transportation Needs:     Lack of Transportation (Medical):  Lack of Transportation (Non-Medical):    Physical Activity:     Days of Exercise per Week:     Minutes of Exercise per Session:    Stress:     Feeling of Stress :    Social Connections:     Frequency of Communication with Friends and Family:     Frequency of Social Gatherings with Friends and Family:     Attends Yaz[employer] Services:     Active Member of Clubs or Organizations:     Attends Club or Organization Meetings:     Marital Status:    Intimate Partner Violence:     Fear of Current or Ex-Partner:     Emotionally Abused:     Physically Abused:     Sexually Abused:      History reviewed. No pertinent family history.     Vitals:    06/28/21 1315 06/28/21 1408 06/28/21 2200 06/29/21 0830   BP: 104/76 108/71 (!) 103/90 115/69   Pulse: 60 59 70 79   Resp: 20 18 22 16 Temp: 97.3 °F (36.3 °C) 97.5 °F (36.4 °C) 97.6 °F (36.4 °C) 98.1 °F (36.7 °C)   TempSrc:  Temporal Temporal Temporal   SpO2: 98% 99% 98%    Weight:       Height:               Intake/Output Summary (Last 24 hours) at 6/29/2021 1331  Last data filed at 6/29/2021 0830  Gross per 24 hour   Intake 480 ml   Output --   Net 480 ml         Recent Labs     06/27/21  0922   WBC 2.2*   HGB 12.8   HCT 38.2         Recent Labs     06/27/21  0922   BUN 9   CREATININE 0.8         ROS:   Negative for CP, palpitations, SOB at rest, dizziness/lightheadedness. Physical Exam   Constitutional: Oriented to person, place, and time. Appears well-developed. No distress. Cardiovascular: Normal rate, regular rhythm and normal heart sounds. Pulmonary/Chest: Effort normal. No respiratory distress. Abdominal: Soft. Bowel sounds are normal.   Musculoskeletal: Normal range of motion. Neurological: alert and oriented to person, place, and time. Skin: Skin is warm and dry. Psychiatric: normal mood and affect. A/P:  1) Lung Abscess  - post Bronch yesterday   - PFTs in chart reviewed. FEV1 104% predicted  DLCO 89% predicted   - Will discuss with attending   - Cont supportive care                 Note: 25 minutes was spent providing face-to-face patient care, including:  and coordinating care, reviewing the chart, labs, and diagnostics, as well as medical decision making. Greater than 50% of this time was spent instructing and counseling the patient face to face regarding findings and recommendations. As above. PFTs good. Cultures pending. Would prefer to wait if possible to give him more chance to heal.  I will see him as an outpatient after he is discharged.   Anam Garcia MD

## 2021-06-29 NOTE — PROGRESS NOTES
Hospitalist Progress Note      SYNOPSIS: Patient admitted on 2021 for Hemoptysis. Patient had been previously admitted to the hospital on 2021 for cavitary lung lesion was seen by pulmonology treated with antibiotics and the lesion improved to fluid-filled lesion. At this time patient stated that 3 days ago he started coughing up and spit not blood again and is complaining of fatigue. Patient was seen in the emergency department by pulmonology who [facility] in consultation for evaluation and treatment of cavitary lung lesions with hemoptysis      SUBJECTIVE:    Patient seen and examined  Records reviewed. Upon evaluation patient was lying on the bed w/o supplemental oxygen. He didn't appear in acute pain or respiratory distress. Patient denied chest pain,SOB and wheezing. No fever,chills or rigors. No vomiting,abdominal pain and diarrhea. Stable overnight. No other overnight issues reported. Temp (24hrs), Av.6 °F (36.4 °C), Min:97.3 °F (36.3 °C), Max:98.1 °F (36.7 °C)    DIET: ADULT DIET; Regular  CODE: Full Code    Intake/Output Summary (Last 24 hours) at 2021 1254  Last data filed at 2021 0830  Gross per 24 hour   Intake 480 ml   Output --   Net 480 ml       OBJECTIVE:    /69   Pulse 79   Temp 98.1 °F (36.7 °C) (Temporal)   Resp 16   Ht 5' 11\" (1.803 m)   Wt 130 lb (59 kg)   SpO2 98%   BMI 18.13 kg/m²     General appearance: No apparent distress, appears stated age and cooperative. HEENT:  Conjunctivae/corneas clear. Neck: Supple. No jugular venous distention. Respiratory: Clear to auscultation bilaterally, normal respiratory effort  Cardiovascular: Regular rate rhythm, normal S1-S2  Abdomen: Soft, nontender, nondistended  Musculoskeletal: No clubbing, cyanosis, no bilateral lower extremity edema. Brisk capillary refill.    Skin:  No rashes  on visible skin  Neurologic: awake, alert and following commands     ASSESSMENT:   # Cavitary lung lesions with recurrent hemoptysis-patient was seen by pulmonary.       -Had bronchoscopy and no bronchial lesion or mass noted. Flu bronchial lavage result. # Chronic tobacco use disorder     - for cessation of smoking. # Right Radial Fracture-due to fall approximately 4 weeks ago patient heads a splint placed in the emergency   - Patient was seen by the Ortho team and the team input noted-continue removable wrist brace,pain control and also outpatient follow up.     DVT Prophylaxis: Lovenox  Diet: Diet NPO  Code Status: Full Code       DISPOSITION: to be determined. Medications:  REVIEWED DAILY    Infusion Medications    sodium chloride       Scheduled Medications    sodium chloride flush  5-40 mL Intravenous 2 times per day    [Held by provider] enoxaparin  40 mg Subcutaneous Daily    cefTRIAXone (ROCEPHIN) IV  1,000 mg Intravenous Q24H     PRN Meds: meperidine, fentanNYL, fentanNYL, HYDROmorphone, HYDROmorphone, promethazine, sodium chloride flush, sodium chloride, ondansetron **OR** ondansetron, polyethylene glycol, acetaminophen **OR** acetaminophen    Labs:     Recent Labs     06/27/21  0922   WBC 2.2*   HGB 12.8   HCT 38.2          Recent Labs     06/27/21  0922      K 4.1      CO2 24   BUN 9   CREATININE 0.8   CALCIUM 9.8       Recent Labs     06/27/21 0922   PROT 7.6   ALKPHOS 82   ALT 15   AST 20   BILITOT 0.3       No results for input(s): INR in the last 72 hours. No results for input(s): [place] in the last 72 hours.     Chronic labs:    No results found for: CHOL, TRIG, HDL, LDLCALC, TSH, PSA, INR, LABA1C    Radiology: REVIEWED DAILY    +++++++++++++++++++++++++++++++++++++++++++++++++  Roldan Floyd MD  [facility] Physician - [address] [place] [address]  +++++++++++++++++++++++++++++++++++++++++++++++++  NOTE: This report was transcribed using voice recognition

## 2021-06-30 VITALS
BODY MASS INDEX: 18.2 KG/M2 | OXYGEN SATURATION: 98 % | DIASTOLIC BLOOD PRESSURE: 59 MMHG | WEIGHT: 130 LBS | TEMPERATURE: 98.5 F | HEIGHT: 71 IN | SYSTOLIC BLOOD PRESSURE: 104 MMHG | HEART RATE: 61 BPM | RESPIRATION RATE: 16 BRPM

## 2021-06-30 DIAGNOSIS — J98.4 PULMONARY CAVITARY LESION: Primary | ICD-10-CM

## 2021-06-30 PROCEDURE — 2580000003 HC RX 258: Performed by: HOSPITALIST

## 2021-06-30 PROCEDURE — 99232 SBSQ HOSP IP/OBS MODERATE 35: CPT | Performed by: THORACIC SURGERY (CARDIOTHORACIC VASCULAR SURGERY)

## 2021-06-30 PROCEDURE — 99232 SBSQ HOSP IP/OBS MODERATE 35: CPT | Performed by: INTERNAL MEDICINE

## 2021-06-30 RX ORDER — AMOXICILLIN AND CLAVULANATE POTASSIUM 562.5; 437.5; 62.5 MG/1; MG/1; MG/1
2 TABLET, FILM COATED, EXTENDED RELEASE ORAL EVERY 12 HOURS SCHEDULED
Status: DISCONTINUED | OUTPATIENT
Start: 2021-06-30 | End: 2021-06-30 | Stop reason: HOSPADM

## 2021-06-30 RX ORDER — AMOXICILLIN AND CLAVULANATE POTASSIUM 562.5; 437.5; 62.5 MG/1; MG/1; MG/1
2 TABLET, FILM COATED, EXTENDED RELEASE ORAL EVERY 12 HOURS SCHEDULED
Qty: 120 TABLET | Refills: 0 | Status: SHIPPED | OUTPATIENT
Start: 2021-06-30 | End: 2021-07-30

## 2021-06-30 RX ADMIN — Medication 10 ML: at 09:02
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
## 2021-06-30 NOTE — PLAN OF CARE
Problem: Bleeding:  Goal: Will show no signs and symptoms of excessive bleeding  Description: Will show no signs and symptoms of excessive bleeding  Outcome: Met This Shift     Problem: Bleeding:  Goal: Absence of active bleeding  Outcome: Met This Shift     Problem: Pain:  Goal: Pain level will decrease  Description: Pain level will decrease  Outcome: Met This Shift     Problem: Pain:  Goal: Control of acute pain  Description: Control of acute pain  Outcome: Met This Shift     Problem: Pain:  Goal: Control of chronic pain  Description: Control of chronic pain  Outcome: Met This Shift
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
## 2021-06-30 NOTE — CARE COORDINATION
Thoracic surgery ok for discharge, to follow up in office 7/27 with a CT scan. New ID consult, await recommendations, spoke with pt, no pcp, he will need an appointment set at discharge. Await ID recommendations for discharge planning. If IV antibiotics q24 can arrange infusion center, if more than once a day then unable to send home with [facility] due to no PCP, will await ID. Pt to go home on oral antibiotics, encouraged pt to follow up with a PCP. JeanP-aul Deluca, MSW, LSW

## 2021-06-30 NOTE — PROGRESS NOTES
CC:Abscess    Brief HPI:  Awake, alert. No complaints. Past Medical History:   Diagnosis Date    Broken wrist, right, closed, initial encounter     Heart murmur      Past Surgical History:   Procedure Laterality Date    BRONCHOSCOPY N/A 6/28/2021    BRONCHOSCOPY ALVEOLAR LAVAGE performed by Gerardo Alexis MD at [facility] History     Socioeconomic History    Marital status: Single     Spouse name: Not on file    Number of children: Not on file    Years of education: Not on file    Highest education level: Not on file   Occupational History    Not on file   Tobacco Use    Smoking status: Current Every Day Smoker     Packs/day: 0.25     Types: Cigarettes    Smokeless tobacco: Never Used   Vaping Use    Vaping Use: Never used   Substance and Sexual Activity    Alcohol use: Not Currently    Drug use: Never    Sexual activity: Not on file   Other Topics Concern    Not on file   Social History Narrative    Not on file     Social Determinants of Health     Financial Resource Strain:     Difficulty of Paying Living Expenses:    Food Insecurity:     Worried About Running Out of Food in the Last Year:     Marko of Food in the Last Year:    Transportation Needs:     Lack of Transportation (Medical):  Lack of Transportation (Non-Medical):    Physical Activity:     Days of Exercise per Week:     Minutes of Exercise per Session:    Stress:     Feeling of Stress :    Social Connections:     Frequency of Communication with Friends and Family:     Frequency of Social Gatherings with Friends and Family:     Attends Sabianist Services:     Active Member of Clubs or Organizations:     Attends Club or Organization Meetings:     Marital Status:    Intimate Partner Violence:     Fear of Current or Ex-Partner:     Emotionally Abused:     Physically Abused:     Sexually Abused:      History reviewed. No pertinent family history.     Vitals:    06/29/21 1545 06/29/21 2030 06/30/21 0000
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
## 2021-07-01 LAB
CULTURE, RESPIRATORY: NORMAL
SMEAR, RESPIRATORY: NORMAL

## 2021-07-06 PROCEDURE — 94726 PLETHYSMOGRAPHY LUNG VOLUMES: CPT | Performed by: INTERNAL MEDICINE

## 2021-07-06 PROCEDURE — 94729 DIFFUSING CAPACITY: CPT | Performed by: INTERNAL MEDICINE

## 2021-07-06 PROCEDURE — 94010 BREATHING CAPACITY TEST: CPT | Performed by: INTERNAL MEDICINE

## 2021-07-07 NOTE — PROCEDURES
[address] [facility],  [facility]                               PULMONARY FUNCTION    PATIENT NAME: Warren Grissom                       :        2000  MED REC NO:   [id number]                            ROOM:       9309  ACCOUNT NO:   [de-identified]                           ADMIT DATE: 2021  PROVIDER:     Mohini Harris MD    DATE OF PROCEDURE:  2021    AGE:  20. HEIGHT:  71.    WEIGHT:  130. SPIROMETRY:  FVC 5.51 which is 113 of predicted. FEV1, which is 4.28,  which is 104 of predicted. FEV1/FVC 78 which is 90% of predicted. MVV  134 which is 69 of predicted. DIFFUSION CAPACITY:  DLCO 30.13 which is 89. DL/VA is 132. IMPRESSION:  This PFT is showing normal spirometry, normal diffusion  capacity. Clinical and radiological correlation indicated.         Bekah Leigh MD    D: 2021 14:53:13       T: 2021 14:58:02     [email or web address]  Job#: [id number]     Doc#: [id number]

## 2021-08-02 LAB
FUNGUS (MYCOLOGY) CULTURE: NORMAL
FUNGUS STAIN: NORMAL

## 2021-08-17 LAB
AFB CULTURE (MYCOBACTERIA): NORMAL
AFB SMEAR: NORMAL

## 2021-10-21 NOTE — PROGRESS NOTES
[facility]  Department of Family Medicine      Patient:  Jennifer Rachel 24 y.o. male     Date of Service: 10/21/21      Chief complaint:   Chief Complaint   Patient presents with   [employer] Establish Care    Cough     coughing up mucus, lump under chin    Fatigue         History ofPresent Illness   The patient is a 24 y.o. male  presented to the clinic with complaints as above. NTP  -has hx of cavitary lung lesion, was bronched, no mass or lesion noted, lavage negative for malignant cells, CT wanted to wait to do anything and have f/u as outpt, ID saw patient and put on oral augmentin for 4 weeks with close f/u in 1-2 weeks  -did finish his four week abx course   -currently, still having a lot of congestion, loss of appetite, notices neck mass which is getting larger, missed appointment do to transportation issues   -has right radius fracture during that hospital stay, was treated with splint and removable brace, currently pain has completely resolved and is able to do push ups   -having bumps in his groin area, been going on for a month, just noticed them, not itchy not painful  -having some constipation however denies any urinary issues or penile discharge  -having some chills, denies fever  -having night sweats, drenching     Past Medical History:      Diagnosis Date    Broken wrist, right, closed, initial encounter     Heart murmur        PastSurgical History:        Procedure Laterality Date    BRONCHOSCOPY N/A 6/28/2021    BRONCHOSCOPY ALVEOLAR LAVAGE performed by Marisol Hemphill MD at [facility]COPY       Allergies:    Patient has no known allergies.     Social History:   Social History     Socioeconomic History    Marital status: Single     Spouse name: Not on file    Number of children: Not on file    Years of education: Not on file    Highest education level: Not on file   Occupational History    Not on file   Tobacco Use    Smoking status: Current Every Day Smoker Packs/day: 0.25     Types: Cigarettes    Smokeless tobacco: Never Used   Vaping Use    Vaping Use: Never used   Substance and Sexual Activity    Alcohol use: Not Currently    Drug use: Never    Sexual activity: Not on file   Other Topics Concern    Not on file   Social History Narrative    Not on file     Social Determinants of Health     Financial Resource Strain: Low Risk     Difficulty of Paying Living Expenses: Not very hard   Food Insecurity: No Food Insecurity    Worried About Running Out of Food in the Last Year: Never true    Marko of Food in the Last Year: Never true   Transportation Needs:     Lack of Transportation (Medical):  Lack of Transportation (Non-Medical):    Physical Activity:     Days of Exercise per Week:     Minutes of Exercise per Session:    Stress:     Feeling of Stress :    Social Connections:     Frequency of Communication with Friends and Family:     Frequency of Social Gatherings with Friends and Family:     Attends Jew Services:     Active Member of Clubs or Organizations:     Attends Club or Organization Meetings:     Marital Status:    Intimate Partner Violence:     Fear of Current or Ex-Partner:     Emotionally Abused:     Physically Abused:     Sexually Abused:         Family History:   History reviewed. No pertinent family history. Review of Systems:   Review of Systems - as above     Physical Exam   Vitals: /72   Pulse 71   Temp 99.1 °F (37.3 °C) (Temporal)   Resp 20   Ht 5' 11\" (1.803 m)   Wt 141 lb 12.8 oz (64.3 kg)   SpO2 100%   BMI 19.78 kg/m²   Physical Exam  Constitutional:       Appearance: He is well-developed. HENT:      Head: Normocephalic and atraumatic. Nose: Mucosal edema present. Right Turbinates: Enlarged and swollen. Left Turbinates: Enlarged and swollen. Eyes:      General:         Right eye: No discharge. Left eye: No discharge.       Conjunctiva/sclera: Conjunctivae normal.   Neck: Trachea: No tracheal deviation. Cardiovascular:      Rate and Rhythm: Normal rate and regular rhythm. Heart sounds: Normal heart sounds. Pulmonary:      Effort: Pulmonary effort is normal. No respiratory distress. Breath sounds: No wheezing. Comments: Diminished in RUL  Abdominal:      General: Bowel sounds are normal. There is no distension. Palpations: Abdomen is soft. Tenderness: There is no abdominal tenderness. Musculoskeletal:         General: No tenderness. Cervical back: Normal range of motion and neck supple. Lymphadenopathy:      Comments: LAD of neck and groin  No supraclavicular LAD appreciated    Skin:     General: Skin is warm and dry. Neurological:      Mental Status: He is alert. Psychiatric:         Behavior: Behavior normal.           Assessment and Plan       1. Cavitary lesion of lung  HFU issue  -Unclear if this has resolved as he was lost to follow up as he could not get transportation  -Will order CT chest stat and refer to pulm and CT for further evaluation, cancel the appointment with CT if chest scan is negative  -Unclear if the neck mass (LAD) is related to his cavitary lesion??? Will get lab work, has hx of leukopenia, if still leukopenic will consider referral to hem/onc  - CBC; Future  - Yamini Oneal MD, Cardiothoracic Surgery, [facility] Bryan Burton MD, Pulmonary, [facility]; Future    2. Neck mass  New issue  -Unclear etiology, could be infection vs other, given size of mass will get US and if above 1 cm will consider biopsy vs treating with abx to see if this improves the size, will also do infectious work up as below  - [address]; Future  - TSH; Future  - US SOFT TISSUE LIMITED AREA; Future    3.  Allergic rhinitis, unspecified seasonality, unspecified trigger  Chronic issue  -Has PE findings suggestive of this and symptoms also, will trial med as below to see if this improves his issues  - azelastine (ASTELIN) 0.1 % nasal spray; 2 sprays by Nasal route 2 times daily Use in each nostril as directed  Dispense: 120 mL; Refill: 1    4. Lymphadenopathy  Discussion as above  -Infectious vs other, will do infectious work up, if negative and still leukopenic, will refer to hem/onc for further evaluation   - Lyme Disease Acute Reflexive Panel; Future  - CYTOMEGALOVIRUS ANTIBODY, IGG; Future  - CYTOMEGALOVIRUS ANTIBODY, IGM; Future  - HEPATITIS C ANTIBODY; Future  - HIV-1 AND HIV-2 ANTIBODIES; Future  - C.TRACHOMATIS Kolleen Prey; Future  - RPR; Future  - HEPATITIS PANEL, ACUTE; Future      Counseled regarding above diagnosis, including possible risks and complications,  especially if left uncontrolled. Counseled regarding the possible side effects, risks, benefits and alternatives to treatment;patient and/or guardian verbalizes understanding, agrees, feels comfortable with and wishes to proceed with above treatment plan. Call or go to [address] [place] if symptoms worsen or persist. Advised patient to call with any new medication issues, and, as applicable, read all Rx info from pharmacy to assure aware of all possible risks and side effects of medicationbefore taking. Patient and/or guardian given opportunity to ask questions/raise concerns. The patient verbalized comfort and understanding ofinstructions. I encourage further reading and education about your health conditions. Information on many health conditions is provided by [facility] [employer]: [email or web address]  Please bring any questions to me at your nextvisit. Return to Office: Return in about 2 weeks (around 11/5/2021) for f/u neck mass .     Medication List:    Current Outpatient Medications   Medication Sig Dispense Refill    azelastine (ASTELIN) 0.1 % nasal spray 2 sprays by Nasal route 2 times daily Use in each nostril as directed 120 mL 1     No current facility-administered medications for this visit. Sabi Meneses,        This document may have been prepared at least partially through the use of voice recognition software. Although effort is taken to assure the accuracy ofthis document, it is possible that grammatical, syntax,  or spelling errors may occur.

## 2021-10-22 ENCOUNTER — OFFICE VISIT (OUTPATIENT)
Dept: PRIMARY CARE CLINIC | Age: 21
End: 2021-10-22
Payer: MEDICAID

## 2021-10-22 ENCOUNTER — TELEPHONE (OUTPATIENT)
Dept: PULMONOLOGY | Age: 21
End: 2021-10-22

## 2021-10-22 ENCOUNTER — HOSPITAL ENCOUNTER (OUTPATIENT)
Age: 21
Discharge: HOME | End: 2021-10-22
Payer: MEDICAID

## 2021-10-22 ENCOUNTER — HOSPITAL ENCOUNTER (OUTPATIENT)
Age: 21
Discharge: HOME | End: 2021-10-24
Payer: MEDICAID

## 2021-10-22 ENCOUNTER — HOSPITAL ENCOUNTER (OUTPATIENT)
Dept: GENERAL RADIOLOGY | Age: 21
Discharge: HOME | End: 2021-10-24
Payer: MEDICAID

## 2021-10-22 ENCOUNTER — HOSPITAL ENCOUNTER (OUTPATIENT)
Dept: CT IMAGING | Age: 21
Discharge: HOME | End: 2021-10-24
Payer: MEDICAID

## 2021-10-22 VITALS
WEIGHT: 141.8 LBS | SYSTOLIC BLOOD PRESSURE: 120 MMHG | BODY MASS INDEX: 19.85 KG/M2 | HEART RATE: 71 BPM | HEIGHT: 71 IN | DIASTOLIC BLOOD PRESSURE: 72 MMHG | OXYGEN SATURATION: 100 % | RESPIRATION RATE: 20 BRPM | TEMPERATURE: 99.1 F

## 2021-10-22 DIAGNOSIS — R59.1 LYMPHADENOPATHY: ICD-10-CM

## 2021-10-22 DIAGNOSIS — J30.9 ALLERGIC RHINITIS, UNSPECIFIED SEASONALITY, UNSPECIFIED TRIGGER: ICD-10-CM

## 2021-10-22 DIAGNOSIS — D72.819 CHRONIC LEUKOPENIA: ICD-10-CM

## 2021-10-22 DIAGNOSIS — J98.4 CAVITARY LESION OF LUNG: Primary | ICD-10-CM

## 2021-10-22 DIAGNOSIS — J98.4 CAVITARY LESION OF LUNG: ICD-10-CM

## 2021-10-22 DIAGNOSIS — R22.1 NECK MASS: ICD-10-CM

## 2021-10-22 DIAGNOSIS — J98.4 CAVITARY LUNG DISEASE: ICD-10-CM

## 2021-10-22 LAB
ALBUMIN SERPL-MCNC: 4.6 G/DL (ref 3.5–5.2)
ALP BLD-CCNC: 78 U/L (ref 40–129)
ALT SERPL-CCNC: 18 U/L (ref 0–40)
ANION GAP SERPL CALCULATED.3IONS-SCNC: 11 MMOL/L (ref 7–16)
AST SERPL-CCNC: 25 U/L (ref 0–39)
BILIRUB SERPL-MCNC: 0.3 MG/DL (ref 0–1.2)
BUN BLDV-MCNC: 10 MG/DL (ref 6–20)
CALCIUM SERPL-MCNC: 9.6 MG/DL (ref 8.6–10.2)
CHLORIDE BLD-SCNC: 103 MMOL/L (ref 98–107)
CO2: 24 MMOL/L (ref 22–29)
CREAT SERPL-MCNC: 0.8 MG/DL (ref 0.7–1.2)
GFR AFRICAN AMERICAN: >60
GFR NON-AFRICAN AMERICAN: >60 ML/MIN/1.73
GLUCOSE BLD-MCNC: 86 MG/DL (ref 74–99)
HCT VFR BLD CALC: 39 % (ref 37–54)
HEMOGLOBIN: 12.8 G/DL (ref 12.5–16.5)
MCH RBC QN AUTO: 30.8 PG (ref 26–35)
MCHC RBC AUTO-ENTMCNC: 32.8 % (ref 32–34.5)
MCV RBC AUTO: 93.8 FL (ref 80–99.9)
PDW BLD-RTO: 13.5 FL (ref 11.5–15)
PLATELET # BLD: 162 E9/L (ref 130–450)
PMV BLD AUTO: 10.2 FL (ref 7–12)
POTASSIUM SERPL-SCNC: 4.2 MMOL/L (ref 3.5–5)
RBC # BLD: 4.16 E12/L (ref 3.8–5.8)
SODIUM BLD-SCNC: 138 MMOL/L (ref 132–146)
TOTAL PROTEIN: 7.4 G/DL (ref 6.4–8.3)
TSH SERPL DL<=0.05 MIU/L-ACNC: 0.68 UIU/ML (ref 0.27–4.2)
WBC # BLD: 2 E9/L (ref 4.5–11.5)

## 2021-10-22 PROCEDURE — 86645 CMV ANTIBODY IGM: CPT

## 2021-10-22 PROCEDURE — 86592 SYPHILIS TEST NON-TREP QUAL: CPT

## 2021-10-22 PROCEDURE — 80074 ACUTE HEPATITIS PANEL: CPT

## 2021-10-22 PROCEDURE — 80053 COMPREHEN METABOLIC PANEL: CPT

## 2021-10-22 PROCEDURE — 36415 COLL VENOUS BLD VENIPUNCTURE: CPT

## 2021-10-22 PROCEDURE — G8427 DOCREV CUR MEDS BY ELIG CLIN: HCPCS | Performed by: STUDENT IN AN ORGANIZED HEALTH CARE EDUCATION/TRAINING PROGRAM

## 2021-10-22 PROCEDURE — 85027 COMPLETE CBC AUTOMATED: CPT

## 2021-10-22 PROCEDURE — 4004F PT TOBACCO SCREEN RCVD TLK: CPT | Performed by: STUDENT IN AN ORGANIZED HEALTH CARE EDUCATION/TRAINING PROGRAM

## 2021-10-22 PROCEDURE — 84443 ASSAY THYROID STIM HORMONE: CPT

## 2021-10-22 PROCEDURE — G8484 FLU IMMUNIZE NO ADMIN: HCPCS | Performed by: STUDENT IN AN ORGANIZED HEALTH CARE EDUCATION/TRAINING PROGRAM

## 2021-10-22 PROCEDURE — 86703 HIV-1/HIV-2 1 RESULT ANTBDY: CPT

## 2021-10-22 PROCEDURE — 86618 LYME DISEASE ANTIBODY: CPT

## 2021-10-22 PROCEDURE — G8420 CALC BMI NORM PARAMETERS: HCPCS | Performed by: STUDENT IN AN ORGANIZED HEALTH CARE EDUCATION/TRAINING PROGRAM

## 2021-10-22 PROCEDURE — 99205 OFFICE O/P NEW HI 60 MIN: CPT | Performed by: STUDENT IN AN ORGANIZED HEALTH CARE EDUCATION/TRAINING PROGRAM

## 2021-10-22 PROCEDURE — 71250 CT THORAX DX C-: CPT

## 2021-10-22 PROCEDURE — 86644 CMV ANTIBODY: CPT

## 2021-10-22 PROCEDURE — 71046 X-RAY EXAM CHEST 2 VIEWS: CPT

## 2021-10-22 RX ORDER — AZELASTINE 1 MG/ML
2 SPRAY, METERED NASAL 2 TIMES DAILY
Qty: 120 ML | Refills: 1 | Status: SHIPPED | OUTPATIENT
Start: 2021-10-22

## 2021-10-22 SDOH — ECONOMIC STABILITY: FOOD INSECURITY: WITHIN THE PAST 12 MONTHS, YOU WORRIED THAT YOUR FOOD WOULD RUN OUT BEFORE YOU GOT MONEY TO BUY MORE.: NEVER TRUE

## 2021-10-22 SDOH — ECONOMIC STABILITY: FOOD INSECURITY: WITHIN THE PAST 12 MONTHS, THE FOOD YOU BOUGHT JUST DIDN'T LAST AND YOU DIDN'T HAVE MONEY TO GET MORE.: NEVER TRUE

## 2021-10-22 ASSESSMENT — PATIENT HEALTH QUESTIONNAIRE - PHQ9
SUM OF ALL RESPONSES TO PHQ QUESTIONS 1-9: 0
2. FEELING DOWN, DEPRESSED OR HOPELESS: 0
SUM OF ALL RESPONSES TO PHQ9 QUESTIONS 1 & 2: 0
1. LITTLE INTEREST OR PLEASURE IN DOING THINGS: 0

## 2021-10-22 ASSESSMENT — SOCIAL DETERMINANTS OF HEALTH (SDOH): HOW HARD IS IT FOR YOU TO PAY FOR THE VERY BASICS LIKE FOOD, HOUSING, MEDICAL CARE, AND HEATING?: NOT VERY HARD

## 2021-10-25 ENCOUNTER — HOSPITAL ENCOUNTER (OUTPATIENT)
Dept: ULTRASOUND IMAGING | Age: 21
Discharge: HOME | End: 2021-10-27
Payer: MEDICAID

## 2021-10-25 DIAGNOSIS — R22.1 NECK MASS: ICD-10-CM

## 2021-10-25 LAB
HAV IGM SER IA-ACNC: NORMAL
HEPATITIS B CORE IGM ANTIBODY: NORMAL
HEPATITIS B SURFACE ANTIGEN INTERPRETATION: NORMAL
HEPATITIS C ANTIBODY INTERPRETATION: NORMAL
HIV-1 AND HIV-2 ANTIBODIES: NORMAL
RPR: NORMAL

## 2021-10-25 PROCEDURE — 76536 US EXAM OF HEAD AND NECK: CPT

## 2021-10-26 DIAGNOSIS — R59.1 LYMPHADENOPATHY: Primary | ICD-10-CM

## 2021-10-26 LAB
C. TRACHOMATIS DNA ,URINE: NEGATIVE
LYME, EIA: 0.23 LIV (ref 0–1.2)
N. GONORRHOEAE DNA, URINE: NEGATIVE
SOURCE: NORMAL

## 2021-10-26 RX ORDER — AZITHROMYCIN 250 MG/1
250 TABLET, FILM COATED ORAL SEE ADMIN INSTRUCTIONS
Qty: 6 TABLET | Refills: 0 | Status: SHIPPED | OUTPATIENT
Start: 2021-10-26 | End: 2021-10-31

## 2021-10-26 NOTE — PROGRESS NOTES
Spoke with patient and told him about his referral to hem/onc and will trial abx for the LAD to see if this improves it. If not, will need further evaluated.     Thank Nissa Hernández

## 2021-10-27 LAB
CYTOMEGALOVIRUS IGG ANTIBODY: NORMAL
CYTOMEGALOVIRUS IGM ANTIBODY: NORMAL

## 2021-10-28 ENCOUNTER — TELEPHONE (OUTPATIENT)
Dept: PULMONOLOGY | Age: 21
End: 2021-10-28

## 2021-10-28 DIAGNOSIS — J98.4 PULMONARY CAVITARY LESION: Primary | ICD-10-CM

## 2021-10-28 NOTE — TELEPHONE ENCOUNTER
Call to pt to discuss follow up with Dr. Linda Sotelo. Advised pt that Dr. Linda Sotelo has reviewed most recent Chest CT which shows Cavitary Lungs Lesion has \"resolved\" and that Dr. Linda Sotelo has ordered a repeat Chest CT to be done in 3 mos and office will arrange appt. Advised office will mail out CT appt letter along with appt for follow up visit with Dr. Linda Sotelo after scan. Confirmed with pt that he has info to follow up with orders to see Oncology at [facility]. Re: neck lymph nodes. Pt verbalized that he has their number to contact for appt.

## 2021-11-04 NOTE — PROGRESS NOTES
[facility]  Department of Family Medicine      Patient:  Sanchez Youngblood 24 y.o. male     Date of Service: 10/21/21      Chief complaint:   Chief Complaint   Patient presents with    2 Week Follow-Up         History ofPresent Illness   The patient is a 24 y.o. male  presented to the clinic with complaints as above. Neck LAD  -f/u, given abx after US showed lymph nodes of 1.5 and 1.2 cm  -currently, finished up abx and is feeling alright  -swelling is about the same, no different since being on the abx  -does have f/u with hematologist on Monday  -denies any fever, chills, or night sweats  -still coughing up mucus, feels zyrtec helps only somewhat     Past Medical History:      Diagnosis Date    Broken wrist, right, closed, initial encounter     Heart murmur        PastSurgical History:        Procedure Laterality Date    BRONCHOSCOPY N/A 6/28/2021    BRONCHOSCOPY ALVEOLAR LAVAGE performed by Yesi Sol MD at [facility]COPY       Allergies:    Patient has no known allergies.     Social History:   Social History     Socioeconomic History    Marital status: Single     Spouse name: Not on file    Number of children: Not on file    Years of education: Not on file    Highest education level: Not on file   Occupational History    Not on file   Tobacco Use    Smoking status: Current Every Day Smoker     Packs/day: 0.25     Types: Cigarettes    Smokeless tobacco: Never Used   Vaping Use    Vaping Use: Never used   Substance and Sexual Activity    Alcohol use: Not Currently    Drug use: Never    Sexual activity: Not on file   Other Topics Concern    Not on file   Social History Narrative    Not on file     Social Determinants of Health     Financial Resource Strain: Low Risk     Difficulty of Paying Living Expenses: Not very hard   Food Insecurity: No Food Insecurity    Worried About Running Out of Food in the Last Year: Never true    Marko of Food in the Last Year: Never true   Transportation Needs:     Lack of Transportation (Medical):  Lack of Transportation (Non-Medical):    Physical Activity:     Days of Exercise per Week:     Minutes of Exercise per Session:    Stress:     Feeling of Stress :    Social Connections:     Frequency of Communication with Friends and Family:     Frequency of Social Gatherings with Friends and Family:     Attends Buddhism Services:     Active Member of Clubs or Organizations:     Attends Club or Organization Meetings:     Marital Status:    Intimate Partner Violence:     Fear of Current or Ex-Partner:     Emotionally Abused:     Physically Abused:     Sexually Abused:         Family History:   No family history on file. Review of Systems:   Review of Systems - as above     Physical Exam   Vitals: /70   Pulse 75   Temp 96.9 °F (36.1 °C) (Infrared)   Resp 20   Ht 5' 10\" (1.778 m)   Wt 137 lb (62.1 kg)   SpO2 98%   BMI 19.66 kg/m²   Physical Exam  Constitutional:       Appearance: He is well-developed. HENT:      Head: Normocephalic and atraumatic. Nose: Mucosal edema present. Right Turbinates: Enlarged and swollen. Left Turbinates: Enlarged and swollen. Eyes:      General:         Right eye: No discharge. Left eye: No discharge. Conjunctiva/sclera: Conjunctivae normal.   Neck:      Trachea: No tracheal deviation. Cardiovascular:      Rate and Rhythm: Normal rate and regular rhythm. Heart sounds: Normal heart sounds. Pulmonary:      Effort: Pulmonary effort is normal. No respiratory distress. Breath sounds: No wheezing. Comments: Diminished in RUL  Abdominal:      General: Bowel sounds are normal. There is no distension. Palpations: Abdomen is soft. Tenderness: There is no abdominal tenderness. Musculoskeletal:         General: No tenderness. Cervical back: Normal range of motion and neck supple.    Lymphadenopathy:      Comments: LAD of neck unchanged from previous  No supraclavicular LAD appreciated    Skin:     General: Skin is warm and dry. Neurological:      Mental Status: He is alert. Psychiatric:         Behavior: Behavior normal.           Assessment and Plan       1. Lymphadenopathy  F/u  -Not improved with abx and lab work was non explanatory  -Does have appropriate f/u with specialist for further evaluation of this  -May need biopsy???    2. Allergic rhinitis, unspecified seasonality, unspecified trigger  F/u  -Not improving, advised patient restart his astelin and will start him on flonase as below as this could explain his cough   - fluticasone (FLONASE) 50 MCG/ACT nasal spray; 2 sprays by Nasal route daily  Dispense: 16 g; Refill: 11          Counseled regarding above diagnosis, including possible risks and complications,  especially if left uncontrolled. Counseled regarding the possible side effects, risks, benefits and alternatives to treatment;patient and/or guardian verbalizes understanding, agrees, feels comfortable with and wishes to proceed with above treatment plan. Call or go to [address] [place] if symptoms worsen or persist. Advised patient to call with any new medication issues, and, as applicable, read all Rx info from pharmacy to assure aware of all possible risks and side effects of medicationbefore taking. Patient and/or guardian given opportunity to ask questions/raise concerns. The patient verbalized comfort and understanding ofinstructions. I encourage further reading and education about your health conditions. Information on many health conditions is provided by [facility] [employer] of Family Physicians: [email or web address]  Please bring any questions to me at your nextvisit. Return to Office: Return in about 3 months (around 2/5/2022) for f/u cough and LAD .     Medication List:    Current Outpatient Medications   Medication Sig Dispense Refill    fluticasone (FLONASE) 50 MCG/ACT nasal spray 2 sprays by Nasal route daily 16 g 11    azelastine (ASTELIN) 0.1 % nasal spray 2 sprays by Nasal route 2 times daily Use in each nostril as directed 120 mL 1    cetirizine (ZYRTEC) 10 MG tablet take 1 tablet by mouth once daily       No current facility-administered medications for this visit. Paty Nguyen DO       This document may have been prepared at least partially through the use of voice recognition software. Although effort is taken to assure the accuracy ofthis document, it is possible that grammatical, syntax,  or spelling errors may occur.

## 2021-11-05 ENCOUNTER — OFFICE VISIT (OUTPATIENT)
Dept: PRIMARY CARE CLINIC | Age: 21
End: 2021-11-05
Payer: MEDICAID

## 2021-11-05 VITALS
OXYGEN SATURATION: 98 % | BODY MASS INDEX: 19.61 KG/M2 | TEMPERATURE: 96.9 F | HEIGHT: 70 IN | WEIGHT: 137 LBS | RESPIRATION RATE: 20 BRPM | DIASTOLIC BLOOD PRESSURE: 70 MMHG | HEART RATE: 75 BPM | SYSTOLIC BLOOD PRESSURE: 104 MMHG

## 2021-11-05 DIAGNOSIS — J30.9 ALLERGIC RHINITIS, UNSPECIFIED SEASONALITY, UNSPECIFIED TRIGGER: Primary | ICD-10-CM

## 2021-11-05 DIAGNOSIS — R59.1 LYMPHADENOPATHY: ICD-10-CM

## 2021-11-05 PROCEDURE — 99213 OFFICE O/P EST LOW 20 MIN: CPT | Performed by: STUDENT IN AN ORGANIZED HEALTH CARE EDUCATION/TRAINING PROGRAM

## 2021-11-05 PROCEDURE — G8420 CALC BMI NORM PARAMETERS: HCPCS | Performed by: STUDENT IN AN ORGANIZED HEALTH CARE EDUCATION/TRAINING PROGRAM

## 2021-11-05 PROCEDURE — 4004F PT TOBACCO SCREEN RCVD TLK: CPT | Performed by: STUDENT IN AN ORGANIZED HEALTH CARE EDUCATION/TRAINING PROGRAM

## 2021-11-05 PROCEDURE — G8484 FLU IMMUNIZE NO ADMIN: HCPCS | Performed by: STUDENT IN AN ORGANIZED HEALTH CARE EDUCATION/TRAINING PROGRAM

## 2021-11-05 PROCEDURE — G8427 DOCREV CUR MEDS BY ELIG CLIN: HCPCS | Performed by: STUDENT IN AN ORGANIZED HEALTH CARE EDUCATION/TRAINING PROGRAM

## 2021-11-05 RX ORDER — CETIRIZINE HYDROCHLORIDE 10 MG/1
TABLET ORAL
COMMUNITY
Start: 2021-10-18

## 2021-11-05 RX ORDER — FLUTICASONE PROPIONATE 50 MCG
2 SPRAY, SUSPENSION (ML) NASAL DAILY
Qty: 16 G | Refills: 11 | Status: SHIPPED | OUTPATIENT
Start: 2021-11-05

## 2021-11-22 ENCOUNTER — TELEPHONE (OUTPATIENT)
Dept: PRIMARY CARE CLINIC | Age: 21
End: 2021-11-22

## 2021-11-22 ENCOUNTER — HOSPITAL ENCOUNTER (OUTPATIENT)
Age: 21
Discharge: HOME | End: 2021-11-24
Payer: MEDICAID

## 2021-11-22 DIAGNOSIS — Z01.818 PREOP TESTING: ICD-10-CM

## 2021-11-22 PROCEDURE — U0003 INFECTIOUS AGENT DETECTION BY NUCLEIC ACID (DNA OR RNA); SEVERE ACUTE RESPIRATORY SYNDROME CORONAVIRUS 2 (SARS-COV-2) (CORONAVIRUS DISEASE [COVID-19]), AMPLIFIED PROBE TECHNIQUE, MAKING USE OF HIGH THROUGHPUT TECHNOLOGIES AS DESCRIBED BY CMS-2020-01-R: HCPCS

## 2021-11-22 PROCEDURE — U0005 INFEC AGEN DETEC AMPLI PROBE: HCPCS

## 2021-11-22 NOTE — TELEPHONE ENCOUNTER
Pt's mother, Monalisa Phoenix (is on HIPAA), was wondering if Dr Giovanni Nesbitt can write a work excuse for patient stating she needs it for financial assistance.  If it is ok, please fax to:  [phone]

## 2021-11-22 NOTE — LETTER
[facility]  [address] [place] [address]  Phone: [phone]  Fax: [phone]    Santos De La Cruz DO        December 1, 2021     Patient: Sheryl Rendon   YOB: 2000   Date of Visit: 11/22/2021       To Whom It May Concern: It is my medical opinion that Brooke Matta is unable to work at this time due to medical appointments and upcoming surgery. .    If you have any questions or concerns, please don't hesitate to call.     Sincerely,        Santos De La Cruz DO

## 2021-11-22 NOTE — PROGRESS NOTES
Have you been tested for COVID  Yes           Have you been told you were positive for COVID No  Have you had any known exposure to someone that is positive for COVID No  Do you have a cough                   No              Do you have shortness of breath No                 Do you have a sore throat            No                Are you having chills                    No                Are you having muscle aches. No                    Please come to the hospital wearing a mask and have your significant other wear a mask as well. Both of you should check your temperature before leaving to come here,  if it is 100 or higher please call [phone] for instruction.

## 2021-11-22 NOTE — PROGRESS NOTES
[facility] PRE-ADMISSION TESTING INSTRUCTIONS    The Preadmission Testing patient is instructed accordingly using the following criteria (check applicable):    ARRIVAL INSTRUCTIONS:  [x] Parking the day of Surgery is located in the Main Entrance lot. Upon entering the door, make an immediate right to the surgery reception desk    [x] Bring photo ID and insurance card    [] Bring in a copy of Living will or Durable Power of  papers. [x] Please be sure to arrange for responsible adult to provide transportation to and from the hospital    [x] Please arrange for responsible adult to be with you for the 24 hour period post procedure due to having anesthesia      GENERAL INSTRUCTIONS:    [x] Nothing by mouth after midnight, including gum, candy, mints or water    [x] You may brush your teeth, but do not swallow any water    [] Take medications as instructed with 1-2 oz of water    [] Stop herbal supplements and vitamins 5 days prior to procedure    [] Follow preop dosing of blood thinners per physician instructions    [] Take 1/2 dose of evening insulin, but no insulin after midnight    [] No oral diabetic medications after midnight    [] If diabetic and have low blood sugar or feel symptomatic, take 1-2oz apple juice only    [] Bring inhalers day of surgery    [] Bring C-PAP/ Bi-Pap day of surgery    [] Bring urine specimen day of surgery    [x] Shower or bath with soap, lather and rinse well, AM of Surgery, no lotion, powders or creams to surgical site    [] Follow bowel prep as instructed per surgeon    [x] No tobacco products within 24 hours of surgery     [x] No alcohol or illegal drug use within 24 hours of surgery.     [x] Jewelry, body piercing's, eyeglasses, contact lenses and dentures are not permitted into surgery (bring cases)      [] Please do not wear any nail polish, make up or hair products on the day of surgery    [x] You can expect a call the business day prior to

## 2021-11-23 LAB — SARS-COV-2, PCR: NOT DETECTED

## 2021-11-23 NOTE — TELEPHONE ENCOUNTER
Mother states the letter should state \"That he is unable to work due to the surgery and medical appointments\"

## 2021-11-26 ENCOUNTER — HOSPITAL ENCOUNTER (OUTPATIENT)
Age: 21
Setting detail: OUTPATIENT SURGERY
Discharge: HOME | End: 2021-11-26
Attending: OTOLARYNGOLOGY | Admitting: OTOLARYNGOLOGY
Payer: MEDICAID

## 2021-11-26 ENCOUNTER — ANESTHESIA EVENT (OUTPATIENT)
Dept: OPERATING ROOM | Age: 21
End: 2021-11-26
Payer: MEDICAID

## 2021-11-26 ENCOUNTER — ANESTHESIA (OUTPATIENT)
Dept: OPERATING ROOM | Age: 21
End: 2021-11-26
Payer: MEDICAID

## 2021-11-26 VITALS
OXYGEN SATURATION: 100 % | SYSTOLIC BLOOD PRESSURE: 111 MMHG | WEIGHT: 139 LBS | BODY MASS INDEX: 19.9 KG/M2 | RESPIRATION RATE: 13 BRPM | HEART RATE: 64 BPM | HEIGHT: 70 IN | TEMPERATURE: 96.4 F | DIASTOLIC BLOOD PRESSURE: 72 MMHG

## 2021-11-26 VITALS
DIASTOLIC BLOOD PRESSURE: 65 MMHG | SYSTOLIC BLOOD PRESSURE: 95 MMHG | TEMPERATURE: 93.7 F | RESPIRATION RATE: 12 BRPM | OXYGEN SATURATION: 100 %

## 2021-11-26 DIAGNOSIS — Z01.818 PREOP TESTING: Primary | ICD-10-CM

## 2021-11-26 DIAGNOSIS — G89.18 POST-OP PAIN: ICD-10-CM

## 2021-11-26 LAB
BASOPHILS ABSOLUTE: 0.01 E9/L (ref 0–0.2)
BASOPHILS RELATIVE PERCENT: 0.4 % (ref 0–2)
EOSINOPHILS ABSOLUTE: 0.06 E9/L (ref 0.05–0.5)
EOSINOPHILS RELATIVE PERCENT: 2.2 % (ref 0–6)
HCT VFR BLD CALC: 39.3 % (ref 37–54)
HEMOGLOBIN: 12.9 G/DL (ref 12.5–16.5)
IMMATURE GRANULOCYTES #: 0.02 E9/L
IMMATURE GRANULOCYTES %: 0.7 % (ref 0–5)
LYMPHOCYTES ABSOLUTE: 0.72 E9/L (ref 1.5–4)
LYMPHOCYTES RELATIVE PERCENT: 26.4 % (ref 20–42)
MCH RBC QN AUTO: 31.5 PG (ref 26–35)
MCHC RBC AUTO-ENTMCNC: 32.8 % (ref 32–34.5)
MCV RBC AUTO: 96.1 FL (ref 80–99.9)
MONOCYTES ABSOLUTE: 0.2 E9/L (ref 0.1–0.95)
MONOCYTES RELATIVE PERCENT: 7.3 % (ref 2–12)
NEUTROPHILS ABSOLUTE: 1.72 E9/L (ref 1.8–7.3)
NEUTROPHILS RELATIVE PERCENT: 63 % (ref 43–80)
PDW BLD-RTO: 14.4 FL (ref 11.5–15)
PLATELET # BLD: 171 E9/L (ref 130–450)
PMV BLD AUTO: 10.1 FL (ref 7–12)
RBC # BLD: 4.09 E12/L (ref 3.8–5.8)
WBC # BLD: 2.7 E9/L (ref 4.5–11.5)

## 2021-11-26 PROCEDURE — 88331 PATH CONSLTJ SURG 1 BLK 1SPC: CPT

## 2021-11-26 PROCEDURE — 2709999900 HC NON-CHARGEABLE SUPPLY: Performed by: OTOLARYNGOLOGY

## 2021-11-26 PROCEDURE — 3600000012 HC SURGERY LEVEL 2 ADDTL 15MIN: Performed by: OTOLARYNGOLOGY

## 2021-11-26 PROCEDURE — 88185 FLOWCYTOMETRY/TC ADD-ON: CPT

## 2021-11-26 PROCEDURE — 2500000003 HC RX 250 WO HCPCS: Performed by: NURSE ANESTHETIST, CERTIFIED REGISTERED

## 2021-11-26 PROCEDURE — 88341 IMHCHEM/IMCYTCHM EA ADD ANTB: CPT

## 2021-11-26 PROCEDURE — 7100000010 HC PHASE II RECOVERY - FIRST 15 MIN: Performed by: OTOLARYNGOLOGY

## 2021-11-26 PROCEDURE — 3600000002 HC SURGERY LEVEL 2 BASE: Performed by: OTOLARYNGOLOGY

## 2021-11-26 PROCEDURE — 2580000003 HC RX 258: Performed by: OTOLARYNGOLOGY

## 2021-11-26 PROCEDURE — 88305 TISSUE EXAM BY PATHOLOGIST: CPT

## 2021-11-26 PROCEDURE — 36415 COLL VENOUS BLD VENIPUNCTURE: CPT

## 2021-11-26 PROCEDURE — 85025 COMPLETE CBC W/AUTO DIFF WBC: CPT

## 2021-11-26 PROCEDURE — 3700000000 HC ANESTHESIA ATTENDED CARE: Performed by: OTOLARYNGOLOGY

## 2021-11-26 PROCEDURE — 7100000001 HC PACU RECOVERY - ADDTL 15 MIN: Performed by: OTOLARYNGOLOGY

## 2021-11-26 PROCEDURE — 3700000001 HC ADD 15 MINUTES (ANESTHESIA): Performed by: OTOLARYNGOLOGY

## 2021-11-26 PROCEDURE — 6360000002 HC RX W HCPCS: Performed by: OTOLARYNGOLOGY

## 2021-11-26 PROCEDURE — 6360000002 HC RX W HCPCS: Performed by: NURSE ANESTHETIST, CERTIFIED REGISTERED

## 2021-11-26 PROCEDURE — 2500000003 HC RX 250 WO HCPCS: Performed by: OTOLARYNGOLOGY

## 2021-11-26 PROCEDURE — 7100000011 HC PHASE II RECOVERY - ADDTL 15 MIN: Performed by: OTOLARYNGOLOGY

## 2021-11-26 PROCEDURE — 88184 FLOWCYTOMETRY/ TC 1 MARKER: CPT

## 2021-11-26 PROCEDURE — 88342 IMHCHEM/IMCYTCHM 1ST ANTB: CPT

## 2021-11-26 PROCEDURE — 7100000000 HC PACU RECOVERY - FIRST 15 MIN: Performed by: OTOLARYNGOLOGY

## 2021-11-26 RX ORDER — FENTANYL CITRATE 50 UG/ML
INJECTION, SOLUTION INTRAMUSCULAR; INTRAVENOUS PRN
Status: DISCONTINUED | OUTPATIENT
Start: 2021-11-26 | End: 2021-11-26 | Stop reason: SDUPTHER

## 2021-11-26 RX ORDER — SODIUM CHLORIDE 0.9 % (FLUSH) 0.9 %
5-40 SYRINGE (ML) INJECTION EVERY 12 HOURS SCHEDULED
Status: DISCONTINUED | OUTPATIENT
Start: 2021-11-26 | End: 2021-11-26 | Stop reason: HOSPADM

## 2021-11-26 RX ORDER — SODIUM CHLORIDE 0.9 % (FLUSH) 0.9 %
5-40 SYRINGE (ML) INJECTION PRN
Status: DISCONTINUED | OUTPATIENT
Start: 2021-11-26 | End: 2021-11-26 | Stop reason: HOSPADM

## 2021-11-26 RX ORDER — ONDANSETRON 2 MG/ML
4 INJECTION INTRAMUSCULAR; INTRAVENOUS
Status: DISCONTINUED | OUTPATIENT
Start: 2021-11-26 | End: 2021-11-26 | Stop reason: HOSPADM

## 2021-11-26 RX ORDER — MEPERIDINE HYDROCHLORIDE 25 MG/ML
25 INJECTION INTRAMUSCULAR; INTRAVENOUS; SUBCUTANEOUS EVERY 5 MIN PRN
Status: DISCONTINUED | OUTPATIENT
Start: 2021-11-26 | End: 2021-11-26 | Stop reason: HOSPADM

## 2021-11-26 RX ORDER — LIDOCAINE HYDROCHLORIDE AND EPINEPHRINE 10; 10 MG/ML; UG/ML
INJECTION, SOLUTION INFILTRATION; PERINEURAL PRN
Status: DISCONTINUED | OUTPATIENT
Start: 2021-11-26 | End: 2021-11-26 | Stop reason: ALTCHOICE

## 2021-11-26 RX ORDER — FENTANYL CITRATE 50 UG/ML
50 INJECTION, SOLUTION INTRAMUSCULAR; INTRAVENOUS EVERY 5 MIN PRN
Status: DISCONTINUED | OUTPATIENT
Start: 2021-11-26 | End: 2021-11-26 | Stop reason: HOSPADM

## 2021-11-26 RX ORDER — ONDANSETRON 2 MG/ML
INJECTION INTRAMUSCULAR; INTRAVENOUS PRN
Status: DISCONTINUED | OUTPATIENT
Start: 2021-11-26 | End: 2021-11-26 | Stop reason: SDUPTHER

## 2021-11-26 RX ORDER — SUCCINYLCHOLINE CHLORIDE 20 MG/ML
INJECTION INTRAMUSCULAR; INTRAVENOUS PRN
Status: DISCONTINUED | OUTPATIENT
Start: 2021-11-26 | End: 2021-11-26 | Stop reason: SDUPTHER

## 2021-11-26 RX ORDER — CEPHALEXIN 500 MG/1
500 CAPSULE ORAL 3 TIMES DAILY
Qty: 21 CAPSULE | Refills: 0 | Status: SHIPPED | OUTPATIENT
Start: 2021-11-26 | End: 2021-12-03

## 2021-11-26 RX ORDER — LIDOCAINE HYDROCHLORIDE 20 MG/ML
INJECTION, SOLUTION INFILTRATION; PERINEURAL PRN
Status: DISCONTINUED | OUTPATIENT
Start: 2021-11-26 | End: 2021-11-26 | Stop reason: SDUPTHER

## 2021-11-26 RX ORDER — FENTANYL CITRATE 50 UG/ML
25 INJECTION, SOLUTION INTRAMUSCULAR; INTRAVENOUS EVERY 5 MIN PRN
Status: DISCONTINUED | OUTPATIENT
Start: 2021-11-26 | End: 2021-11-26 | Stop reason: HOSPADM

## 2021-11-26 RX ORDER — GLYCOPYRROLATE 0.2 MG/ML
INJECTION INTRAMUSCULAR; INTRAVENOUS PRN
Status: DISCONTINUED | OUTPATIENT
Start: 2021-11-26 | End: 2021-11-26 | Stop reason: SDUPTHER

## 2021-11-26 RX ORDER — DEXAMETHASONE SODIUM PHOSPHATE 4 MG/ML
INJECTION, SOLUTION INTRA-ARTICULAR; INTRALESIONAL; INTRAMUSCULAR; INTRAVENOUS; SOFT TISSUE PRN
Status: DISCONTINUED | OUTPATIENT
Start: 2021-11-26 | End: 2021-11-26 | Stop reason: SDUPTHER

## 2021-11-26 RX ORDER — MIDAZOLAM HYDROCHLORIDE 1 MG/ML
INJECTION INTRAMUSCULAR; INTRAVENOUS PRN
Status: DISCONTINUED | OUTPATIENT
Start: 2021-11-26 | End: 2021-11-26 | Stop reason: SDUPTHER

## 2021-11-26 RX ORDER — SODIUM CHLORIDE 9 MG/ML
25 INJECTION, SOLUTION INTRAVENOUS PRN
Status: DISCONTINUED | OUTPATIENT
Start: 2021-11-26 | End: 2021-11-26 | Stop reason: HOSPADM

## 2021-11-26 RX ORDER — HYDROCODONE BITARTRATE AND ACETAMINOPHEN 5; 325 MG/1; MG/1
1 TABLET ORAL EVERY 6 HOURS PRN
Qty: 12 TABLET | Refills: 0 | Status: SHIPPED | OUTPATIENT
Start: 2021-11-26 | End: 2021-11-29

## 2021-11-26 RX ORDER — PROPOFOL 10 MG/ML
INJECTION, EMULSION INTRAVENOUS PRN
Status: DISCONTINUED | OUTPATIENT
Start: 2021-11-26 | End: 2021-11-26 | Stop reason: SDUPTHER

## 2021-11-26 RX ORDER — SODIUM CHLORIDE, SODIUM LACTATE, POTASSIUM CHLORIDE, CALCIUM CHLORIDE 600; 310; 30; 20 MG/100ML; MG/100ML; MG/100ML; MG/100ML
INJECTION, SOLUTION INTRAVENOUS CONTINUOUS
Status: DISCONTINUED | OUTPATIENT
Start: 2021-11-26 | End: 2021-11-26 | Stop reason: HOSPADM

## 2021-11-26 RX ORDER — PROPOFOL 10 MG/ML
INJECTION, EMULSION INTRAVENOUS CONTINUOUS PRN
Status: DISCONTINUED | OUTPATIENT
Start: 2021-11-26 | End: 2021-11-26 | Stop reason: SDUPTHER

## 2021-11-26 RX ADMIN — FENTANYL CITRATE 50 MCG: 50 INJECTION, SOLUTION INTRAMUSCULAR; INTRAVENOUS at 11:22

## 2021-11-26 RX ADMIN — FENTANYL CITRATE 100 MCG: 50 INJECTION, SOLUTION INTRAMUSCULAR; INTRAVENOUS at 11:17

## 2021-11-26 RX ADMIN — MIDAZOLAM 2 MG: 1 INJECTION INTRAMUSCULAR; INTRAVENOUS at 11:14

## 2021-11-26 RX ADMIN — SUCCINYLCHOLINE CHLORIDE 160 MG: 20 INJECTION, SOLUTION INTRAMUSCULAR; INTRAVENOUS at 11:17

## 2021-11-26 RX ADMIN — Medication 2000 MG: at 11:14

## 2021-11-26 RX ADMIN — DEXAMETHASONE SODIUM PHOSPHATE 10 MG: 4 INJECTION, SOLUTION INTRAMUSCULAR; INTRAVENOUS at 11:22

## 2021-11-26 RX ADMIN — PROPOFOL 150 MCG/KG/MIN: 10 INJECTION, EMULSION INTRAVENOUS at 11:23

## 2021-11-26 RX ADMIN — LIDOCAINE HYDROCHLORIDE 100 MG: 20 INJECTION, SOLUTION INFILTRATION; PERINEURAL at 11:17

## 2021-11-26 RX ADMIN — SODIUM CHLORIDE: 9 INJECTION, SOLUTION INTRAVENOUS at 11:14

## 2021-11-26 RX ADMIN — GLYCOPYRROLATE 0.2 MG: 0.2 INJECTION INTRAMUSCULAR; INTRAVENOUS at 11:17

## 2021-11-26 RX ADMIN — ONDANSETRON 4 MG: 2 INJECTION INTRAMUSCULAR; INTRAVENOUS at 11:34

## 2021-11-26 RX ADMIN — PROPOFOL 200 MG: 10 INJECTION, EMULSION INTRAVENOUS at 11:17

## 2021-11-26 ASSESSMENT — PULMONARY FUNCTION TESTS
PIF_VALUE: 12
PIF_VALUE: 13
PIF_VALUE: 9
PIF_VALUE: 1
PIF_VALUE: 6
PIF_VALUE: 2
PIF_VALUE: 5
PIF_VALUE: 12
PIF_VALUE: 2
PIF_VALUE: 24
PIF_VALUE: 1
PIF_VALUE: 1
PIF_VALUE: 0
PIF_VALUE: 11
PIF_VALUE: 12
PIF_VALUE: 12
PIF_VALUE: 11
PIF_VALUE: 11
PIF_VALUE: 2
PIF_VALUE: 6
PIF_VALUE: 7
PIF_VALUE: 2
PIF_VALUE: 5
PIF_VALUE: 11
PIF_VALUE: 1
PIF_VALUE: 0
PIF_VALUE: 11
PIF_VALUE: 0
PIF_VALUE: 18
PIF_VALUE: 1
PIF_VALUE: 19
PIF_VALUE: 11
PIF_VALUE: 27
PIF_VALUE: 5
PIF_VALUE: 2
PIF_VALUE: 2
PIF_VALUE: 12
PIF_VALUE: 32
PIF_VALUE: 12
PIF_VALUE: 8
PIF_VALUE: 1
PIF_VALUE: 13
PIF_VALUE: 1
PIF_VALUE: 2
PIF_VALUE: 12
PIF_VALUE: 13
PIF_VALUE: 7
PIF_VALUE: 36
PIF_VALUE: 11
PIF_VALUE: 12
PIF_VALUE: 0
PIF_VALUE: 13
PIF_VALUE: 1
PIF_VALUE: 7
PIF_VALUE: 1
PIF_VALUE: 1
PIF_VALUE: 2
PIF_VALUE: 12
PIF_VALUE: 0
PIF_VALUE: 10
PIF_VALUE: 12
PIF_VALUE: 2
PIF_VALUE: 0
PIF_VALUE: 12
PIF_VALUE: 3
PIF_VALUE: 2
PIF_VALUE: 1

## 2021-11-26 ASSESSMENT — PAIN SCALES - GENERAL
PAINLEVEL_OUTOF10: 0
PAINLEVEL_OUTOF10: 0

## 2021-11-26 ASSESSMENT — PAIN - FUNCTIONAL ASSESSMENT: PAIN_FUNCTIONAL_ASSESSMENT: 0-10

## 2021-11-26 ASSESSMENT — LIFESTYLE VARIABLES: SMOKING_STATUS: 1

## 2021-11-26 NOTE — H&P
H&P reviewed, no changes. No issues. Questions and concerns were answered to the patient's satisfaction.  Will proceed with procedure    Will discuss with attending     Electronically signed by Malorie Chopra DO on 11/26/21 at 10:30 AM EST

## 2021-11-26 NOTE — ANESTHESIA POSTPROCEDURE EVALUATION
Department of Anesthesiology  Postprocedure Note    Patient: Lucy Sharma  MRN: [id number]  [place]: 2000  Date of evaluation: 11/26/2021  Time:  12:23 PM     Procedure Summary     Date: 11/26/21 Room / Location: [facility] 01 / 106 [facility]    Anesthesia Start: 1110 Anesthesia Stop: 0989    Procedure: OPEN BIOPSY OF SUBMENTAL NECK MASS WITH NERVE INTEGRITY MONITOR   (PATHOLOGY NOTIFIED) (N/A Neck) Diagnosis: (NECK MASS)    Surgeons: Tahira Bridges MD Responsible Provider: Armani Mena MD    Anesthesia Type: general ASA Status: 2          Anesthesia Type: general    Tiago Phase I: Tiago Score: 10    Tiago Phase II:      Last vitals: Reviewed and per EMR flowsheets.        Anesthesia Post Evaluation    Patient location during evaluation: PACU  Patient participation: complete - patient participated  Level of consciousness: awake and alert  Airway patency: patent  Nausea & Vomiting: no nausea and no vomiting  Complications: no  Cardiovascular status: blood pressure returned to baseline  Respiratory status: acceptable  Hydration status: euvolemic

## 2021-11-26 NOTE — ANESTHESIA PRE PROCEDURE
Department of Anesthesiology  Preprocedure Note       Name:  Brigid Ba   Age:  24 y.o.  :  2000                                          MRN:  [id number]         Date:  2021      Surgeon: Kandace Salcido):  Shawna Fitzgerald MD    Procedure: Procedure(s):  OPEN BIOPSY OF SUBMENTAL NECK MASS WITH NERVE INTEGRITY MONITOR   (PATHOLOGY NOTIFIED)    Medications prior to admission:   Prior to Admission medications    Medication Sig Start Date End Date Taking?  Authorizing Provider   cetirizine (ZYRTEC) 10 MG tablet take 1 tablet by mouth once daily 10/18/21  Yes Historical Provider, MD   fluticasone (FLONASE) 50 MCG/ACT nasal spray 2 sprays by Nasal route daily 21  Yes Codey Oviedo DO   azelastine (ASTELIN) 0.1 % nasal spray 2 sprays by Nasal route 2 times daily Use in each nostril as directed 10/22/21  Yes Royal Paola DO       Current medications:    Current Facility-Administered Medications   Medication Dose Route Frequency Provider Last Rate Last Admin    lactated ringers infusion   IntraVENous Continuous Shawna Fitzgerald MD        sodium chloride flush 0.9 % injection 5-40 mL  5-40 mL IntraVENous 2 times per day Shawna Fitzgerald MD        sodium chloride flush 0.9 % injection 5-40 mL  5-40 mL IntraVENous PRN Shawna Fitzgerald MD        0.9 % sodium chloride infusion  25 mL IntraVENous PRN Shawna Fitzgerald MD        ceFAZolin (ANCEF) 2000 mg in sterile water 20 mL IV syringe  2,000 mg IntraVENous On Call to [address] [place] [address] [address] MD           Allergies:  No Known Allergies    Problem List:    Patient Active Problem List   Diagnosis Code    Cavitary lesion of lung J98.4    Pulmonary cavitary lesion J98.4       Past Medical History:        Diagnosis Date    Heart murmur     Neck mass        Past Surgical History:        Procedure Laterality Date    BRONCHOSCOPY N/A 2021    BRONCHOSCOPY ALVEOLAR LAVAGE performed by Love Esquivel MD at [address] [place] [address] Social History:    Social History     Tobacco Use    Smoking status: Current Every Day Smoker     Packs/day: 0.25     Years: 5.00     Pack years: 1.25     Types: Cigarettes    Smokeless tobacco: Never Used   Substance Use Topics    Alcohol use: Yes     Alcohol/week: 6.0 standard drinks     Types: 6 Cans of beer per week                                Ready to quit: Not Answered  Counseling given: Not Answered      Vital Signs (Current):   Vitals:    11/22/21 1533 11/26/21 0904   BP:  124/72   Pulse:  71   Resp:  18   Temp:  98.1 °F (36.7 °C)   TempSrc:  Temporal   SpO2:  100%   Weight: 139 lb (63 kg) 139 lb (63 kg)   Height: 5' 10\" (1.778 m) 5' 10\" (1.778 m)                                              BP Readings from Last 3 Encounters:   11/26/21 124/72   11/05/21 104/70   10/22/21 120/72       NPO Status: Time of last liquid consumption: 2350                        Time of last solid consumption: 2350                        Date of last liquid consumption: 11/25/21                        Date of last solid food consumption: 11/25/21    BMI:   Wt Readings from Last 3 Encounters:   11/26/21 139 lb (63 kg)   11/05/21 137 lb (62.1 kg)   10/22/21 141 lb 12.8 oz (64.3 kg)     Body mass index is 19.94 kg/m².     CBC:   Lab Results   Component Value Date    WBC 2.0 10/22/2021    RBC 4.16 10/22/2021    HGB 12.8 10/22/2021    HCT 39.0 10/22/2021    MCV 93.8 10/22/2021    RDW 13.5 10/22/2021     10/22/2021       CMP:   Lab Results   Component Value Date     10/22/2021    K 4.2 10/22/2021    K 4.1 06/27/2021     10/22/2021    CO2 24 10/22/2021    BUN 10 10/22/2021    CREATININE 0.8 10/22/2021    GFRAA >60 10/22/2021    LABGLOM >60 10/22/2021    GLUCOSE 86 10/22/2021    PROT 7.4 10/22/2021    CALCIUM 9.6 10/22/2021    BILITOT 0.3 10/22/2021    ALKPHOS 78 10/22/2021    AST 25 10/22/2021    ALT 18 10/22/2021       POC Tests: No results for input(s): POCGLU, POCNA, POCK, POCCL, POCBUN, POCHEMO, POCHCT in the last 72 hours. Coags: No results found for: PROTIME, INR, APTT    HCG (If Applicable): No results found for: PREGTESTUR, PREGSERUM, HCG, HCGQUANT     ABGs: No results found for: PHART, PO2ART, TKN4KAX, DGH1GEX, BEART, G2OAVFJY     Type & Screen (If Applicable):  No results found for: LABABO, LABRH    Drug/Infectious Status (If Applicable):  No results found for: HIV, HEPCAB    COVID-19 Screening (If Applicable):   Lab Results   Component Value Date    COVID19 Not Detected 11/22/2021           Anesthesia Evaluation  Patient summary reviewed  Airway: Mallampati: II       Mouth opening: > = 3 FB Dental: normal exam         Pulmonary: breath sounds clear to auscultation  (+) current smoker                           Cardiovascular:Negative CV ROS            Rhythm: regular  Rate: normal                    Neuro/Psych:   Negative Neuro/Psych ROS              GI/Hepatic/Renal: Neg GI/Hepatic/Renal ROS            Endo/Other: Negative Endo/Other ROS                    Abdominal:       Abdomen: soft. Vascular: Other Findings:             Anesthesia Plan      general     ASA 2       Induction: intravenous. Plan discussed with CRNA. DOS STAFF ADDENDUM:    Patient seen and examined, physical exam updated as needed, chart reviewed. NPO status confirmed. Anesthetic options and risks discussed with patient/legal guardian. Patient/legal guardian verbalized understanding and agrees to proceed.      DARWIN Rice - CRNA  Staff CRNA  November 26, 2021  10:34 AM                    ASPEN Ibarra MD   11/26/2021

## 2021-11-26 NOTE — BRIEF OP NOTE
Brief Postoperative Note      Patient: Mariel Beth  YOB: 2000  MRN: [id number]     Date of Procedure: 11/26/2021    Pre-Op Diagnosis: NECK MASS    Post-Op Diagnosis: Same       Procedure(s):  OPEN BIOPSY OF SUBMENTAL NECK MASS WITH NERVE INTEGRITY MONITOR   (PATHOLOGY NOTIFIED)    Surgeon(s):  Noni Brito MD    Assistant:  Resident: Frankey Combe, DO    Anesthesia: General    Estimated Blood Loss (mL): Minimal    Complications: None    Specimens:   ID Type Source Tests Collected by Time Destination   A : SUBMENTAL LYMPH NODE Tissue Tissue SURGICAL PATHOLOGY Noni Brito MD 11/26/2021 1137        Implants:  * No implants in log *      Drains: * No LDAs found *    Findings: multiple matted submental lymph nodes. ~2.5 cm lymph node removed, hard, yellow in appearance.      Electronically signed by Frankey Combe, DO on 11/26/2021 at 12:04 PM

## 2021-11-26 NOTE — PROGRESS NOTES
CLINICAL PHARMACY NOTE: MEDS TO BEDS    Total # of Prescriptions Filled: 2   The following medications were delivered to the patient:  · Cephalexin 500 mg  · norco 5-325 mg    Additional Documentation:

## 2021-11-26 NOTE — PROGRESS NOTES
Went over discharge instructions with patient and mother. Both verbalized understanding of instructions. Mother went and picked up prescriptions and then out to the car.

## 2021-11-26 NOTE — PROGRESS NOTES
INTRAOPERATIVE CONSULTATION (with FROZEN SECTION)    Submental lymph node - lymphoproliferative disorder (flow sent)

## 2021-12-02 LAB
Lab: NORMAL
REPORT: NORMAL
THIS TEST SENT TO: NORMAL

## 2021-12-06 ENCOUNTER — TELEPHONE (OUTPATIENT)
Dept: PULMONOLOGY | Age: 21
End: 2021-12-06

## 2021-12-06 NOTE — TELEPHONE ENCOUNTER
Mailed a letter to patient informing him that his CT Chest is scheduled for 1-3-21 at 1:00 pm at the [facility]. He must arrive by 12:30 pm. There is no prep for this test

## 2021-12-14 ENCOUNTER — TELEPHONE (OUTPATIENT)
Dept: SURGERY | Age: 21
End: 2021-12-14

## 2021-12-14 NOTE — TELEPHONE ENCOUNTER
DR. Yuliya Trujillo OFFICE CALLED AND CANCELED BECKI'S MEDIPORT INSERT - HE WANTS TO RUN MORE TESTING BEFORE HE HAS HIM RESCHEDULED FOR HIS MEDIPORT

## 2021-12-19 ENCOUNTER — APPOINTMENT (OUTPATIENT)
Dept: GENERAL RADIOLOGY | Age: 21
End: 2021-12-19
Payer: MEDICAID

## 2021-12-19 ENCOUNTER — HOSPITAL ENCOUNTER (EMERGENCY)
Age: 21
Discharge: HOME | End: 2021-12-19
Attending: STUDENT IN AN ORGANIZED HEALTH CARE EDUCATION/TRAINING PROGRAM
Payer: MEDICAID

## 2021-12-19 VITALS
DIASTOLIC BLOOD PRESSURE: 92 MMHG | WEIGHT: 130 LBS | RESPIRATION RATE: 20 BRPM | OXYGEN SATURATION: 100 % | HEART RATE: 92 BPM | TEMPERATURE: 97.8 F | HEIGHT: 70 IN | BODY MASS INDEX: 18.61 KG/M2 | SYSTOLIC BLOOD PRESSURE: 150 MMHG

## 2021-12-19 DIAGNOSIS — F12.90 CANNABINOID HYPEREMESIS SYNDROME: Primary | ICD-10-CM

## 2021-12-19 DIAGNOSIS — R11.2 CANNABINOID HYPEREMESIS SYNDROME: Primary | ICD-10-CM

## 2021-12-19 LAB
ALBUMIN SERPL-MCNC: 5.4 G/DL (ref 3.5–5.2)
ALP BLD-CCNC: 99 U/L (ref 40–129)
ALT SERPL-CCNC: 16 U/L (ref 0–40)
ANION GAP SERPL CALCULATED.3IONS-SCNC: 16 MMOL/L (ref 7–16)
AST SERPL-CCNC: 22 U/L (ref 0–39)
BASOPHILS ABSOLUTE: 0.01 E9/L (ref 0–0.2)
BASOPHILS RELATIVE PERCENT: 0.3 % (ref 0–2)
BILIRUB SERPL-MCNC: <0.2 MG/DL (ref 0–1.2)
BUN BLDV-MCNC: 12 MG/DL (ref 6–20)
CALCIUM SERPL-MCNC: 10.1 MG/DL (ref 8.6–10.2)
CHLORIDE BLD-SCNC: 102 MMOL/L (ref 98–107)
CO2: 21 MMOL/L (ref 22–29)
CREAT SERPL-MCNC: 0.8 MG/DL (ref 0.7–1.2)
EKG ATRIAL RATE: 63 BPM
EKG P AXIS: 75 DEGREES
EKG P-R INTERVAL: 204 MS
EKG Q-T INTERVAL: 456 MS
EKG QRS DURATION: 118 MS
EKG QTC CALCULATION (BAZETT): 466 MS
EKG R AXIS: 89 DEGREES
EKG T AXIS: 70 DEGREES
EKG VENTRICULAR RATE: 63 BPM
EOSINOPHILS ABSOLUTE: 0.05 E9/L (ref 0.05–0.5)
EOSINOPHILS RELATIVE PERCENT: 1.7 % (ref 0–6)
GFR AFRICAN AMERICAN: >60
GFR NON-AFRICAN AMERICAN: >60 ML/MIN/1.73
GLUCOSE BLD-MCNC: 106 MG/DL (ref 74–99)
HCT VFR BLD CALC: 43.1 % (ref 37–54)
HEMOGLOBIN: 14.4 G/DL (ref 12.5–16.5)
IMMATURE GRANULOCYTES #: 0.01 E9/L
IMMATURE GRANULOCYTES %: 0.3 % (ref 0–5)
LYMPHOCYTES ABSOLUTE: 1.39 E9/L (ref 1.5–4)
LYMPHOCYTES RELATIVE PERCENT: 48.1 % (ref 20–42)
MAGNESIUM: 2 MG/DL (ref 1.6–2.6)
MCH RBC QN AUTO: 31.7 PG (ref 26–35)
MCHC RBC AUTO-ENTMCNC: 33.4 % (ref 32–34.5)
MCV RBC AUTO: 94.9 FL (ref 80–99.9)
MONOCYTES ABSOLUTE: 0.33 E9/L (ref 0.1–0.95)
MONOCYTES RELATIVE PERCENT: 11.4 % (ref 2–12)
NEUTROPHILS ABSOLUTE: 1.1 E9/L (ref 1.8–7.3)
NEUTROPHILS RELATIVE PERCENT: 38.2 % (ref 43–80)
PDW BLD-RTO: 13.7 FL (ref 11.5–15)
PLATELET # BLD: 172 E9/L (ref 130–450)
PMV BLD AUTO: 9.7 FL (ref 7–12)
POTASSIUM SERPL-SCNC: 3.8 MMOL/L (ref 3.5–5)
RBC # BLD: 4.54 E12/L (ref 3.8–5.8)
SODIUM BLD-SCNC: 139 MMOL/L (ref 132–146)
TOTAL PROTEIN: 8.4 G/DL (ref 6.4–8.3)
WBC # BLD: 2.9 E9/L (ref 4.5–11.5)

## 2021-12-19 PROCEDURE — 2580000003 HC RX 258: Performed by: STUDENT IN AN ORGANIZED HEALTH CARE EDUCATION/TRAINING PROGRAM

## 2021-12-19 PROCEDURE — 96372 THER/PROPH/DIAG INJ SC/IM: CPT

## 2021-12-19 PROCEDURE — 80053 COMPREHEN METABOLIC PANEL: CPT

## 2021-12-19 PROCEDURE — 85025 COMPLETE CBC W/AUTO DIFF WBC: CPT

## 2021-12-19 PROCEDURE — 71045 X-RAY EXAM CHEST 1 VIEW: CPT

## 2021-12-19 PROCEDURE — 96374 THER/PROPH/DIAG INJ IV PUSH: CPT

## 2021-12-19 PROCEDURE — 83735 ASSAY OF MAGNESIUM: CPT

## 2021-12-19 PROCEDURE — 93005 ELECTROCARDIOGRAM TRACING: CPT | Performed by: STUDENT IN AN ORGANIZED HEALTH CARE EDUCATION/TRAINING PROGRAM

## 2021-12-19 PROCEDURE — 6360000002 HC RX W HCPCS: Performed by: STUDENT IN AN ORGANIZED HEALTH CARE EDUCATION/TRAINING PROGRAM

## 2021-12-19 PROCEDURE — 99285 EMERGENCY DEPT VISIT HI MDM: CPT

## 2021-12-19 RX ORDER — 0.9 % SODIUM CHLORIDE 0.9 %
1000 INTRAVENOUS SOLUTION INTRAVENOUS ONCE
Status: COMPLETED | OUTPATIENT
Start: 2021-12-19 | End: 2021-12-19

## 2021-12-19 RX ORDER — PROMETHAZINE HYDROCHLORIDE 25 MG/1
25 TABLET ORAL EVERY 6 HOURS PRN
Qty: 10 TABLET | Refills: 0 | Status: SHIPPED | OUTPATIENT
Start: 2021-12-19 | End: 2021-12-22

## 2021-12-19 RX ORDER — DROPERIDOL 2.5 MG/ML
2.5 INJECTION, SOLUTION INTRAMUSCULAR; INTRAVENOUS ONCE
Status: COMPLETED | OUTPATIENT
Start: 2021-12-19 | End: 2021-12-19

## 2021-12-19 RX ORDER — DIPHENHYDRAMINE HYDROCHLORIDE 50 MG/ML
25 INJECTION INTRAMUSCULAR; INTRAVENOUS ONCE
Status: COMPLETED | OUTPATIENT
Start: 2021-12-19 | End: 2021-12-19

## 2021-12-19 RX ADMIN — SODIUM CHLORIDE 1000 ML: 9 INJECTION, SOLUTION INTRAVENOUS at 10:14

## 2021-12-19 RX ADMIN — DROPERIDOL 2.5 MG: 2.5 INJECTION, SOLUTION INTRAMUSCULAR; INTRAVENOUS at 10:14

## 2021-12-19 RX ADMIN — DIPHENHYDRAMINE HYDROCHLORIDE 25 MG: 50 INJECTION, SOLUTION INTRAMUSCULAR; INTRAVENOUS at 10:43

## 2021-12-19 ASSESSMENT — ENCOUNTER SYMPTOMS
NAUSEA: 1
DIARRHEA: 0
VOICE CHANGE: 0
TROUBLE SWALLOWING: 0
WHEEZING: 0
BACK PAIN: 0
COUGH: 0
CONSTIPATION: 0
COLOR CHANGE: 0
SHORTNESS OF BREATH: 0
RECTAL PAIN: 0
VOMITING: 1
ABDOMINAL PAIN: 1

## 2021-12-19 ASSESSMENT — PAIN DESCRIPTION - LOCATION: LOCATION: ABDOMEN

## 2021-12-19 ASSESSMENT — PAIN SCALES - GENERAL: PAINLEVEL_OUTOF10: 10

## 2021-12-19 ASSESSMENT — PAIN DESCRIPTION - PAIN TYPE: TYPE: ACUTE PAIN

## 2021-12-19 NOTE — ED PROVIDER NOTES
[address]      Pt Name: Sheryl Rendon  MRN: [id number]  [place] 2000  Date of evaluation: 12/19/2021      CHIEF COMPLAINT       Chief Complaint   Patient presents with    Abdominal Pain     dx with [facility] Monday and having abdominal pain. HPI  Sheryl Rendon is a 24 y.o. male history of Hodgkin's lymphoma 6 days who admits to drinking a pint of hard alcohol yesterday and smoking marijuana daily presents with nausea vomiting abdominal pain. Patient states he woke up this morning and had cyclic vomiting. States he is multiplied more than 8 times. No blood in it. No alleviating or exacerbating factors. Is not taking any medications measure alleviate symptoms. Denies any recent fevers, chills, chest pain, shortness of breath, flank pain, dysuria, hematuria, diarrhea, constipation, new rashes or sores. Except as noted above the remainder of the review of systems was reviewed and negative. Review of Systems   Constitutional: Negative for appetite change, chills, diaphoresis, fatigue, fever and unexpected weight change. HENT: Negative for trouble swallowing and voice change. Eyes: Negative for visual disturbance. Respiratory: Negative for cough, shortness of breath and wheezing. Cardiovascular: Negative for chest pain. Gastrointestinal: Positive for abdominal pain, nausea and vomiting. Negative for constipation, diarrhea and rectal pain. Endocrine: Negative for polyphagia and polyuria. Genitourinary: Negative for dysuria and frequency. Musculoskeletal: Negative for arthralgias and back pain. Skin: Negative for color change, pallor, rash and wound. Neurological: Negative for weakness and headaches. Hematological: Negative for adenopathy. Psychiatric/Behavioral: Negative for agitation. All other systems reviewed and are negative.        Physical Exam  Vitals and nursing note reviewed. Constitutional:       General: He is not in acute distress. Appearance: Normal appearance. He is not ill-appearing or diaphoretic. HENT:      Head: Normocephalic and atraumatic. Nose: Nose normal.   Eyes:      Extraocular Movements: Extraocular movements intact. Pupils: Pupils are equal, round, and reactive to light. Cardiovascular:      Rate and Rhythm: Normal rate and regular rhythm. Pulses: Normal pulses. Heart sounds: No murmur heard. No friction rub. No gallop. Pulmonary:      Effort: Pulmonary effort is normal.      Breath sounds: Normal breath sounds. Abdominal:      General: Bowel sounds are normal.      Palpations: Abdomen is soft. Tenderness: There is no abdominal tenderness. There is no right CVA tenderness, left CVA tenderness or rebound. Negative signs include Bradshaw's sign, Rovsing's sign and McBurney's sign. Hernia: No hernia is present. Musculoskeletal:         General: Normal range of motion. Cervical back: Normal range of motion. Skin:     General: Skin is warm and dry. Capillary Refill: Capillary refill takes less than 2 seconds. Neurological:      General: No focal deficit present. Mental Status: He is alert and oriented to person, place, and time. Psychiatric:         Mood and Affect: Mood normal.          Procedures     MDM  Number of Diagnoses or Management Options  Cannabinoid hyperemesis syndrome  Diagnosis management comments: 50-year-old male presents with complaints of abdominal pain. He was recently diagnosed with non-Hodgkin's lymphoma 6 days prior. Does add that he was smoking marijuana daily until yesterday as well as training a pint of hard liquor with his friends. Vitals significant for tachycardia. On physical exam patient is no acute distress. He has cyclic vomiting. No blood in his vomitus. Abdomen mildly tender to palpation at the epigastrium. No rebound or guarding.   Diagnostic labs and reports that he does not use drugs. Family History: family history is not on file. The patients home medications have been reviewed. Allergies: Patient has no known allergies.     -------------------------------------------------- RESULTS -------------------------------------------------  Labs:  Results for orders placed or performed during the hospital encounter of 12/19/21   CBC Auto Differential   Result Value Ref Range    WBC 2.9 (L) 4.5 - 11.5 E9/L    RBC 4.54 3.80 - 5.80 E12/L    Hemoglobin 14.4 12.5 - 16.5 g/dL    Hematocrit 43.1 37.0 - 54.0 %    MCV 94.9 80.0 - 99.9 fL    MCH 31.7 26.0 - 35.0 pg    MCHC 33.4 32.0 - 34.5 %    RDW 13.7 11.5 - 15.0 fL    Platelets 499 058 - 837 E9/L    MPV 9.7 7.0 - 12.0 fL    Neutrophils % 38.2 (L) 43.0 - 80.0 %    Immature Granulocytes % 0.3 0.0 - 5.0 %    Lymphocytes % 48.1 (H) 20.0 - 42.0 %    Monocytes % 11.4 2.0 - 12.0 %    Eosinophils % 1.7 0.0 - 6.0 %    Basophils % 0.3 0.0 - 2.0 %    Neutrophils Absolute 1.10 (L) 1.80 - 7.30 E9/L    Immature Granulocytes # 0.01 E9/L    Lymphocytes Absolute 1.39 (L) 1.50 - 4.00 E9/L    Monocytes Absolute 0.33 0.10 - 0.95 E9/L    Eosinophils Absolute 0.05 0.05 - 0.50 E9/L    Basophils Absolute 0.01 0.00 - 0.20 E9/L   Comprehensive Metabolic Panel   Result Value Ref Range    Sodium 139 132 - 146 mmol/L    Potassium 3.8 3.5 - 5.0 mmol/L    Chloride 102 98 - 107 mmol/L    CO2 21 (L) 22 - 29 mmol/L    Anion Gap 16 7 - 16 mmol/L    Glucose 106 (H) 74 - 99 mg/dL    BUN 12 6 - 20 mg/dL    CREATININE 0.8 0.7 - 1.2 mg/dL    GFR Non-African American >60 >=60 mL/min/1.73    GFR African American >60     Calcium 10.1 8.6 - 10.2 mg/dL    Total Protein 8.4 (H) 6.4 - 8.3 g/dL    Albumin 5.4 (H) 3.5 - 5.2 g/dL    Total Bilirubin <0.2 0.0 - 1.2 mg/dL    Alkaline Phosphatase 99 40 - 129 U/L    ALT 16 0 - 40 U/L    AST 22 0 - 39 U/L   Magnesium   Result Value Ref Range    Magnesium 2.0 1.6 - 2.6 mg/dL       ECG:  Please refer to workup tab for EKG read    Radiology:  XR CHEST PORTABLE   Final Result   No acute cardiopulmonary process. ------------------------- NURSING NOTES AND VITALS REVIEWED ---------------------------  Date / Time Roomed:  12/19/2021  9:53 AM  ED Bed Assignment:  07/07    The nursing notes within the ED encounter and vital signs as below have been reviewed. BP (!) 150/92   Pulse 92   Temp 97.8 °F (36.6 °C)   Resp 20   Ht 5' 10\" (1.778 m)   Wt 130 lb (59 kg)   SpO2 100%   BMI 18.65 kg/m²   Oxygen Saturation Interpretation: normal      ------------------------------------------ PROGRESS NOTES ------------------------------------------    I have spoken with the patient and discussed todays results, in addition to providing specific details for the plan of care and counseling regarding the diagnosis and prognosis. Their questions are answered at this time and they are agreeable with the plan. I discussed at length with them reasons for immediate return here for re evaluation. They will followup with their PCP by calling their office tomorrow. --------------------------------- ADDITIONAL PROVIDER NOTES ---------------------------------  At this time the patient is without objective evidence of an acute process requiring hospitalization or inpatient management. They have remained hemodynamically stable throughout their entire ED visit and are stable for discharge with outpatient follow-up. The plan has been discussed in detail and they are aware of the specific conditions for emergent return, as well as the importance of follow-up. New Prescriptions    PROMETHAZINE (PHENERGAN) 25 MG TABLET    Take 1 tablet by mouth every 6 hours as needed for Nausea       Diagnosis:  1. Cannabinoid hyperemesis syndrome        Disposition:  Patient's disposition: Discharge to home  Patient's condition is stable.         Lico Tidwell MD  Resident  12/19/21 [id number]

## 2021-12-20 ENCOUNTER — TELEPHONE (OUTPATIENT)
Dept: SURGERY | Age: 21
End: 2021-12-20

## 2022-01-11 ENCOUNTER — HOSPITAL ENCOUNTER (OUTPATIENT)
Dept: NUCLEAR MEDICINE | Age: 22
Discharge: HOME | End: 2022-01-13
Payer: MEDICAID

## 2022-01-11 DIAGNOSIS — D72.819 LEUKOPENIA, UNSPECIFIED TYPE: ICD-10-CM

## 2022-01-11 PROCEDURE — A9552 F18 FDG: HCPCS | Performed by: RADIOLOGY

## 2022-01-11 PROCEDURE — 78815 PET IMAGE W/CT SKULL-THIGH: CPT | Performed by: RADIOLOGY

## 2022-01-11 PROCEDURE — 3430000000 HC RX DIAGNOSTIC RADIOPHARMACEUTICAL: Performed by: RADIOLOGY

## 2022-01-11 PROCEDURE — 78815 PET IMAGE W/CT SKULL-THIGH: CPT

## 2022-01-11 RX ORDER — FLUDEOXYGLUCOSE F 18 200 MCI/ML
14.3 INJECTION, SOLUTION INTRAVENOUS
Status: COMPLETED | OUTPATIENT
Start: 2022-01-11 | End: 2022-01-11

## 2022-01-11 RX ADMIN — FLUDEOXYGLUCOSE F 18 14.3 MILLICURIE: 200 INJECTION, SOLUTION INTRAVENOUS at 09:15

## 2022-01-14 PROBLEM — C81.90 HODGKIN LYMPHOMA (HCC): Status: ACTIVE | Noted: 2022-01-14

## 2022-01-21 ENCOUNTER — TELEPHONE (OUTPATIENT)
Dept: CASE MANAGEMENT | Age: 22
End: 2022-01-21

## 2022-01-21 ENCOUNTER — HOSPITAL ENCOUNTER (OUTPATIENT)
Dept: RADIATION ONCOLOGY | Age: 22
Discharge: HOME | End: 2022-01-21
Payer: MEDICAID

## 2022-01-21 VITALS
TEMPERATURE: 97.1 F | WEIGHT: 139.8 LBS | RESPIRATION RATE: 18 BRPM | SYSTOLIC BLOOD PRESSURE: 110 MMHG | BODY MASS INDEX: 20.06 KG/M2 | HEART RATE: 76 BPM | OXYGEN SATURATION: 98 % | DIASTOLIC BLOOD PRESSURE: 64 MMHG

## 2022-01-21 DIAGNOSIS — C81.01 NODULAR LYMPHOCYTE PREDOMINANT HODGKIN LYMPHOMA OF LYMPH NODES OF NECK (HCC): Primary | ICD-10-CM

## 2022-01-21 PROCEDURE — 99205 OFFICE O/P NEW HI 60 MIN: CPT

## 2022-01-21 PROCEDURE — 99245 OFF/OP CONSLTJ NEW/EST HI 55: CPT | Performed by: RADIOLOGY

## 2022-01-21 NOTE — PROGRESS NOTES
Radiation Oncology      Katrin Keys. Courtney Dick  [facility]      Referring Physician: Dr. Iva Song      Primary Care Physician:Yaniv Oviedo DO   Primary Oncologist: Dr. Iva Song      Diagnosis: SG IIA contiguous NLPHL      Service:  Radiation Oncology consultation performed on 1/21/21        HPI:        Kemal Retana is a pleasant 24year old with NLPHL, SG II. He presents here after seeing Dr. Connor Lamb for medical oncology and Dr. Max Dey who did the biopsy of his chin/neck. He was complaining of having a lot of mucus and a large size nodule under his chin. He had a bx done on 11/26/21 that presented nodular lymphocyte predominant Hodgkin lymphoma. He had a PET scan on 1/11/22 that had uptake at the level of the hard soft palate, the posterior oral pharynx, the tonsils. PET report noted. The patient presents today to discuss fractionated external beam radiation therapy as a component of multidisciplinary, definative management. We reviewed the available medical records including the complete medical history of this pt today prior to consultation. Epic -CE and available scanned documents per the Epic Media tab were reviewed PRN. A complete ROS was also performed today and is noted below. During consultation today I personally discussed the pts workup to date; including but not limited to applicable imaging studies, Pathology reports, and interventions. The NCCN guidelines, as pertaining to the above diagnosis were also recapped for the pt today in brief. Today, Erasmo Vallejo  notes Sx that include sore throat. KPS 90. Denies significant weight loss and drenching night sweats. ACS lifestyle modification reviewed. -----    Per [address]:      BCC note reviewed.       -----    Pathology reviewed:      Diagnosis:   Nodular lymphocyte predominant Hodgkin lymphoma.  See comment     Comment:   Sections of the lymph node show partial effacement of the   valeri architecture by diffuse to vaguely nodular infiltrate of   predominantly small lymphoid cells.  Scattered admixed large atypical   lymphoid cells some with morphologic features suggestive of Hodgkin cells   are present.  Immunohistochemical stains are performed the results are as   follows:   LCA: Large cells positive CD15: Large cells negative  CD30: Rare large   cells positive   CD 3: Negative   CD20: Large cells positive                    PAX5:   Large cells weakly positive   Flow cytometric immunophenotyping performed at [facility] shows   no evidence of B-cell non-Hodgkin lymphoma.  Please see corresponding   flow cytometry report from [facility] for additional details. This case has been seen in consultation by Dr. Jovan Longo at the [facility] and Dr. Jovan Longo concurs with the above diagnosis. [facility] see   [facility] clinic pathology report for additional details. Intradepartmental and extradepartmental consultation is obtained. -----      Past Medical History:   Diagnosis Date    Heart murmur     Neck mass        Past Surgical History:   Procedure Laterality Date    BRONCHOSCOPY N/A 6/28/2021    BRONCHOSCOPY ALVEOLAR LAVAGE performed by Fatmata Oliveira MD at [facility] N/A 11/26/2021    OPEN BIOPSY OF SUBMENTAL NECK MASS WITH NERVE INTEGRITY MONITOR performed by Flor Bernard MD at [facility]       History reviewed. No pertinent family history. Current Outpatient Medications   Medication Sig Dispense Refill    cetirizine (ZYRTEC) 10 MG tablet take 1 tablet by mouth once daily      fluticasone (FLONASE) 50 MCG/ACT nasal spray 2 sprays by Nasal route daily 16 g 11    azelastine (ASTELIN) 0.1 % nasal spray 2 sprays by Nasal route 2 times daily Use in each nostril as directed 120 mL 1     No current facility-administered medications for this encounter.        No Known Allergies    Social History     Socioeconomic History    Marital status: Single     Spouse name: None    Number of children: None    Years of education: None    Highest education level: None   Occupational History    None   Tobacco Use    Smoking status: Current Every Day Smoker     Packs/day: 0.25     Years: 5.00     Pack years: 1.25     Types: Cigarettes    Smokeless tobacco: Never Used   Vaping Use    Vaping Use: Never used   Substance and Sexual Activity    Alcohol use: Yes     Alcohol/week: 6.0 standard drinks     Types: 6 Cans of beer per week    Drug use: Yes     Types: Marijuana Rondi Baba)    Sexual activity: None   Other Topics Concern    None   Social History Narrative    None     Social Determinants of Health     Financial Resource Strain: Low Risk     Difficulty of Paying Living Expenses: Not very hard   Food Insecurity: No Food Insecurity    Worried About Running Out of Food in the Last Year: Never true    Marko of Food in the Last Year: Never true   Transportation Needs:     Lack of Transportation (Medical): Not on file    Lack of Transportation (Non-Medical):  Not on file   Physical Activity:     Days of Exercise per Week: Not on file    Minutes of Exercise per Session: Not on file   Stress:     Feeling of Stress : Not on file   Social Connections:     Frequency of Communication with Friends and Family: Not on file    Frequency of Social Gatherings with Friends and Family: Not on file    Attends Congregational Services: Not on file    Active Member of [address] [place] [address] or Organizations: Not on file    Attends Club or Organization Meetings: Not on file    Marital Status: Not on file   Intimate Partner Violence:     Fear of Current or Ex-Partner: Not on file    Emotionally Abused: Not on file    Physically Abused: Not on file    Sexually Abused: Not on file   Housing Stability:     Unable to Pay for Housing in the Last Year: Not on file    Number of [place] in the Last Year: Not on file    Unstable Housing in the Last Year: Not on file           Review of Systems - History obtained from chart review and the patient  General ROS: positive for  - fatigue  Psychological ROS: negative  Ophthalmic ROS: negative  ENT ROS: sore throat  Allergy and Immunology ROS: negative  Hematological and Lymphatic ROS: negative  Endocrine ROS: negative  Breast ROS: negative for breast lumps  Respiratory ROS: no cough, shortness of breath, or wheezing  Cardiovascular ROS: no chest pain or dyspnea on exertion  Gastrointestinal ROS: no abdominal pain, change in bowel habits, or black or bloody stools  Genito-Urinary ROS: no dysuria, trouble voiding, or hematuria  Musculoskeletal ROS: negative  Neurological ROS: no TIA or stroke symptoms  Dermatological ROS: negative        Physical Exam  HENT:      Head: Normocephalic and atraumatic. Right Ear: External ear normal.      Left Ear: External ear normal.      Nose: Nose normal.      Mouth/Throat:      Mouth: Mucous membranes are moist.   Eyes:      Pupils: Pupils are equal, round, and reactive to light. Cardiovascular:      Rate and Rhythm: Normal rate. Pulses: Normal pulses. Heart sounds: Normal heart sounds. Pulmonary:      Effort: Pulmonary effort is normal.   Musculoskeletal:         General: Normal range of motion. Lymphadenopathy:      Cervical: Cervical adenopathy present. Skin:     General: Skin is warm. Neurological:      General: No focal deficit present. Mental Status: He is alert and oriented to person, place, and time. Psychiatric:         Mood and Affect: Mood normal.         Behavior: Behavior normal.         Thought Content:  Thought content normal.         Judgment: Judgment normal.           Imaging reviewed:        PET 1/11/22:  Impression   1.  FDG avid tracer uptake at the level of the hard soft palate the   posterior oral pharynx and the tonsils, the finding is nonspecific,   this could be physiologic, correlation with direct visualization is   suggested   2.  FDG avid tracer uptake anterior to C6. This is solitary very   small focus of tracer uptake, without convincing corresponding CT   abnormality, possibly physiologic uptake in the esophagus, although   concerning as there is no other focus of tracer uptake in the   esophagus   3.  Low-level tracer uptake seen in the left anterior ribs which does   not exceed the threshold SUV         Radiation Safety and Treatment Support:  -previous Radiation history: No  -history of connective tissue disease: No  -history of autoimmune disease: No  -pregnant: not applicable  -fertility conservation and /or contraception discussed: no  -nutrition consult prior to [address]: Yes  -PEG: No  -Dental evaluation prior to treatment:No  -Social Work requested: Yes  -Oncology Nurse Navigator requested: Yes  -pre + post treatment PT / Rehab / PM+R evaluation considered: Yes  -ICD: No   -ICD brand: -  -[facility] patient navigator: Kurtis Ricardo  -Nurse Practitioners for Radiation Oncology:    ---Michael Barrios, MSN, RN, FNP-C   ---Asia Martinez, MSN, RN, FNP-BC        Assessment and Plan: Erasmo is a pleasant and cooperative 24year old with a recent diagnosis of AJCC stage group II-A NLPHL. We recommend definative intent fractionated external beam radiation therapy (ISRT per ILROG) [NCCN HL 1.2022 HODG-8]. The risks (including late toxicity), benefits, alternatives, process and logistics of external beam radiation were reviewed today. We answered all of the patient's questions to the best of our ability. Erasmo verbalized understanding and seemed satisfied. Radiation planning will commence within 7 days; the next step in management being the simulation scan, with external beam radiation to commence in a timely fashion thereafter. It was a pleasure meeting Erasmo today and we appreciate the referral and opportunity to be involved in his care.   We had an extensive discussion today regarding the course to date (including a focused review of theapplicable radiographic and laboratory information), multidisciplinary approach to cancer care, and indications for external beam radiation therapy as a component therein. A literature review and multidisciplinary discussion was performed after seeing this patient due to the complexity of the medical decision making in this case. I personally spent greater than 80 minutes on this case and with this patient. I performed the complete history and physical as above at today's visit, at least 45 minutes was in direct discussion and  regarding disease management.          -jeff, ISRT        Remy Escalante MD [place] [address] Oncology  Cell: [phone]    [facility]:  [facility] [address]: [phone]  [address] [place] [address] Street:  [phone]   FAX:    [phone]  [address] [place] [address] Road:  [phone]   FAX:  [phone]        NOTE: This report was transcribed using voice recognition software. Every effort was made to ensure accuracy; however, inadvertent computerized transcription errors may be present.

## 2022-01-21 NOTE — PROGRESS NOTES
Diogo Love  2000 24 y.o. Referring Physician: Dr. Zhen Saleh    PCP: Mary Walton, DO     Vitals:    01/21/22 0839   BP: 110/64   Pulse: 76   Resp: 18   Temp: 97.1 °F (36.2 °C)   SpO2: 98%        Wt Readings from Last 3 Encounters:   01/21/22 139 lb 12.8 oz (63.4 kg)   12/19/21 130 lb (59 kg)   11/26/21 139 lb (63 kg)        Body mass index is 20.06 kg/m². Chief Complaint: No chief complaint on file. Cancer Staging  No matching staging information was found for the patient. Prior Radiation Therapy? NO    Concurrent Chemo/radiation? NO    Prior Chemotherapy? NO    Prior Hormonal Therapy? NO    Head and Neck Cancer? No, patient does NOT have HN cancer. Current Outpatient Medications   Medication Sig Dispense Refill    cetirizine (ZYRTEC) 10 MG tablet take 1 tablet by mouth once daily      fluticasone (FLONASE) 50 MCG/ACT nasal spray 2 sprays by Nasal route daily 16 g 11    azelastine (ASTELIN) 0.1 % nasal spray 2 sprays by Nasal route 2 times daily Use in each nostril as directed 120 mL 1     No current facility-administered medications for this encounter. Past Medical History:   Diagnosis Date    Heart murmur     Neck mass        Past Surgical History:   Procedure Laterality Date    BRONCHOSCOPY N/A 6/28/2021    BRONCHOSCOPY ALVEOLAR LAVAGE performed by Simón Sanchez MD at [facility]. [place] [address] N/A 11/26/2021    OPEN BIOPSY OF SUBMENTAL NECK MASS WITH NERVE INTEGRITY MONITOR performed by Pernell Ledesma MD at [address] [place] [address]       History reviewed. No pertinent family history.     Social History     Socioeconomic History    Marital status: Single     Spouse name: Not on file    Number of children: Not on file    Years of education: Not on file    Highest education level: Not on file   Occupational History    Not on file   Tobacco Use    Smoking status: Current Every Day Smoker     Packs/day: 0.25     Years: 5.00     Pack years: 1.25 Types: Cigarettes    Smokeless tobacco: Never Used   Vaping Use    Vaping Use: Never used   Substance and Sexual Activity    Alcohol use: Yes     Alcohol/week: 6.0 standard drinks     Types: 6 Cans of beer per week    Drug use: Yes     Types: Marijuana [place])    Sexual activity: Not on file   Other Topics Concern    Not on file   Social History Narrative    Not on file     Social Determinants of Health     Financial Resource Strain: Low Risk     Difficulty of Paying Living Expenses: Not very hard   Food Insecurity: No Food Insecurity    Worried About Running Out of Food in the Last Year: Never true    Marko of Food in the Last Year: Never true   Transportation Needs:     Lack of Transportation (Medical): Not on file    Lack of Transportation (Non-Medical): Not on file   Physical Activity:     Days of Exercise per Week: Not on file    Minutes of Exercise per Session: Not on file   Stress:     Feeling of Stress : Not on file   Social Connections:     Frequency of Communication with Friends and Family: Not on file    Frequency of Social Gatherings with Friends and Family: Not on file    Attends Tenriism Services: Not on file    Active Member of [address] [place] [address] or Organizations: Not on file    Attends Club or Organization Meetings: Not on file    Marital Status: Not on file   Intimate Partner Violence:     Fear of Current or Ex-Partner: Not on file    Emotionally Abused: Not on file    Physically Abused: Not on file    Sexually Abused: Not on file   Housing Stability:     Unable to Pay for Housing in the Last Year: Not on file    Number of [place] in the Last Year: Not on file    Unstable Housing in the Last Year: Not on file           Occupation: non  Retired:  NO        [address]: <<For Level 5, 10 or more systems>> approximately >20mins spent with patient and his mother about head and neck radiation therapy utilizing handouts and slides.  He presents here after seeing Dr. Tucker Duron for medical oncology and Dr. Xu Tony who did the biopsy of his chin/neck. He was complaining of having a lot of mucus and a large size nodule under his chin. He had a bx done on 11/26/21 that presented nodular lymphocyte predominant Hodgkin lymphoma. He had a PET scan on 1/11/22 that had uptake at the level of the hard soft palate, the posterior oral pharynx, the tonsils, the anterior to C6 and possibly the esophagus. All questions were answered from a nursing perspective and they expressed understanding of care. Pacemaker/Defibulator/ICD:  No    Mediport: No        FALLS RISK SCREENING ASSESSMENT    Instructions:  Assess the patient and enter the appropriate indicators that are present for fall risk identification. Total the numbers entered and assign a fall risk score from Table 2.  Reassess patient at a minimum every 12 weeks or with status change. Assessment   Date  1/21/2022     1. Mental Ability: confusion/cognitively impaired No - 0       2. Elimination Issues: incontinence, frequency No - 0       3. Ambulatory: use of assistive devices (walker, cane, off-loading devices), attached to equipment (IV pole, oxygen) No - 0     4. Sensory Limitations: dizziness, vertigo, impaired vision No - 0       5. Age Less than 65 years - 0       6. Medication: diuretics, strong analgesics, hypnotics, sedatives, antihypertensive agents   No - 0   7. Falls:  recent history of falls within the last 3 months (not to include slipping or tripping)   No - 0   TOTAL 0    If score of 4 or greater was education given? Yes       TABLE 2   Risk Score Risk Level Plan of Care   0-3 Little or  No Risk 1. Provide assistance as indicated for ambulation activities  2. Reorient confused/cognitively impaired patient  3. Call-light/bell within patient's reach  4. Chair/bed in low position, stretcher/bed with siderails up except when performing patient care activities  5. Educate patient/family/caregiver on falls prevention  6. Reassess in 12 weeks or with any noted change in patient condition which places them at a risk for a fall   4-6 Moderate Risk 1. Provide assistance as indicated for ambulation activities  2. Reorient confused/cognitively impaired patient  3. Call-light/bell within patient's reach  4. Chair/bed in low position, stretcher/bed with siderails up except when performing patient care activities  5. Educate patient/family/caregiver on falls prevention  6. Falls risk precaution (Yellow sticker Level II) placed on patient chart   7 or   Higher High Risk 1. Place patient in easily observable treatment room  2. Patient attended at all times by family member or staff  3. Provide assistance as indicated for ambulation activities  4. Reorient confused/cognitively impaired patient  5. Call-light/bell within patient's reach  6. Chair/bed in low position, stretcher/bed with siderails up except when performing patient care activities  7. Educate patient/family/caregiver on falls prevention  8. Falls risk precaution (Yellow sticker Level III) placed on patient chart           MALNUTRITION RISK SCREENING ASSESSMENT    Instructions:  Assess the patient and enter the appropriate indicators that are present for nutrition risk identification. Total the numbers entered and assign a risk score. Follow the appropriate action for total score listed below. Assessment   Date  1/21/2022     1. Have you lost weight without trying? 0- No     2. Have you been eating poorly because of a decreased appetite? 0- No   3. Do you have a diagnosis of head and neck cancer?       0- No                                                                                    TOTAL 0          Score of 0-1: No action  Score 2 or greater:  · For Non-Diabetic Patient: Recommend adding Ensure Complete 2 x daily and provide patient with Ensure wellness bag with coupons  · For Diabetic Patient: Recommend adding Glucerna Shake 2 x daily and provide patient with [employer] Wellness bag with coupons  · Route to the dietitian via [address]    · Are you having difficulty performing daily routine tasks due to fatigue or weakness (ie: bathing/showering, dressing, housework, meal prep, work, , etc): No     · Do you have any arm flexibility/ROM restrictions, swelling or pain that limit activity: No     · Any changes in memory, attention/focus that impact daily activities: No     · Do you avoid participation in leisure/social activity due to weakness, fatigue or pain: No     ARE ANY OF THE ABOVE ARE ANSWERED YES: No          PT ASSESSMENT FOR REFERRAL    · Have you had any recent falls in the past 2 months: No     · Do you have difficulty going up/down stairs: No     · Are you having difficulty walking: No     · Do you often hold onto furniture/environmental supports or feel off balance when you are walking: No     · Do you need to take rest breaks when you are walking: No     · Any pain on a scale of 1-10 that limits your mobility: No 0/10    ARE ANY OF THE ABOVE ARE ANSWERED YES: No           LYMPHEDEMA SCREENING ASSESSMENT FOR PATIENTS WITH BREAST CANCER    The patient reports the following signs/symptoms of lymphedema: None    Please ask the provider to assess patient for lymphedema for any reported signs or symptoms so a referral to Lymphedema Therapy can be considered. PREHAB AUDIOLOGY REFERRAL    - Is patient planned to receive Cisplatin? No. This patient is not planned to start Cisplatin. - Is patient planned to receive radiation therapy that may be directed toward auditory canals or nerves? No. Patient is not planned to start radiation therapy to auditory canals or nerves. - Is patient complaining of new onset hearing loss? No. Patient is not complaining of new onset hearing loss. Patient education given on radiation to the head and neck.  The patient expresses understanding and acceptance of instructions.  Sherry Carballo RN 1/21/2022 8:42 AM           Sherry Carballo RN

## 2022-01-21 NOTE — TELEPHONE ENCOUNTER
Met with patient and mom during his initial consultation with Dr. Isaac Lam for his recent Hodgkins Lymphoma cancer diagnosis. Introduced myself and explained my role with patients receiving treatment at our center. Patient was friendly and receptive. They deny any needs or questions at this time. He follows with Dr Robert Matos for Medical Oncology. Copy of his pathology findings given including cancer type. Instructed on next steps including CT simulation and Radiation planning and treatments per Dr. Robin Rios recommendations and follow up care. Provided patient with  literature  on ACS Hodgkins Lymphoma, ACS Radiation Therapy and Community Transportation Resource list. Reviewed resources available including Social Work, Dietician, and Financial Navigator. Provided with my contact information and instructed patient to call me with questions or concerns. Verbalizes understanding. Patient appreciative of visit. Will continue to follow.

## 2022-01-26 NOTE — PATIENT INSTRUCTIONS
AFIA Bishop. Jordan Lange MD [place]:  [phone]   FAX: [phone]  [address] [place] [address]:  [phone]   FAX:    [phone]  [address] [place] [address] Road:  [phone]   FAX:  [phone]  Email: [email or web address]

## 2022-01-28 ENCOUNTER — HOSPITAL ENCOUNTER (OUTPATIENT)
Dept: RADIATION ONCOLOGY | Age: 22
Discharge: HOME | End: 2022-01-28
Attending: RADIOLOGY
Payer: MEDICAID

## 2022-01-28 PROCEDURE — 77263 THER RADIOLOGY TX PLNG CPLX: CPT | Performed by: RADIOLOGY

## 2022-01-28 PROCEDURE — 77334 RADIATION TREATMENT AID(S): CPT | Performed by: RADIOLOGY

## 2022-02-09 ENCOUNTER — OFFICE VISIT (OUTPATIENT)
Dept: PRIMARY CARE CLINIC | Age: 22
End: 2022-02-09
Payer: MEDICAID

## 2022-02-09 VITALS
WEIGHT: 140 LBS | RESPIRATION RATE: 18 BRPM | TEMPERATURE: 97.3 F | HEART RATE: 77 BPM | BODY MASS INDEX: 20.04 KG/M2 | OXYGEN SATURATION: 100 % | SYSTOLIC BLOOD PRESSURE: 100 MMHG | DIASTOLIC BLOOD PRESSURE: 70 MMHG | HEIGHT: 70 IN

## 2022-02-09 DIAGNOSIS — N50.9 SCROTAL LESION: Primary | ICD-10-CM

## 2022-02-09 DIAGNOSIS — C81.01 NODULAR LYMPHOCYTE PREDOMINANT HODGKIN LYMPHOMA OF LYMPH NODES OF NECK (HCC): ICD-10-CM

## 2022-02-09 PROCEDURE — G8427 DOCREV CUR MEDS BY ELIG CLIN: HCPCS | Performed by: STUDENT IN AN ORGANIZED HEALTH CARE EDUCATION/TRAINING PROGRAM

## 2022-02-09 PROCEDURE — 99213 OFFICE O/P EST LOW 20 MIN: CPT | Performed by: STUDENT IN AN ORGANIZED HEALTH CARE EDUCATION/TRAINING PROGRAM

## 2022-02-09 PROCEDURE — G8420 CALC BMI NORM PARAMETERS: HCPCS | Performed by: STUDENT IN AN ORGANIZED HEALTH CARE EDUCATION/TRAINING PROGRAM

## 2022-02-09 PROCEDURE — 4004F PT TOBACCO SCREEN RCVD TLK: CPT | Performed by: STUDENT IN AN ORGANIZED HEALTH CARE EDUCATION/TRAINING PROGRAM

## 2022-02-09 PROCEDURE — G8484 FLU IMMUNIZE NO ADMIN: HCPCS | Performed by: STUDENT IN AN ORGANIZED HEALTH CARE EDUCATION/TRAINING PROGRAM

## 2022-02-09 NOTE — PROGRESS NOTES
[facility]  Department of Family Medicine      Patient:  Mirtha Grimm 24 y.o. male     Date of Service: 2/3/22      Chief complaint:   Chief Complaint   Patient presents with    3 Month Follow-Up         History ofPresent Illness   The patient is a 24 y.o. male  presented to the clinic with complaints as above. LAD  -f/u  -did see ENT, did a biopsy which showed nodular predominant hodgkin lymphoma   -following with hem/onc, referred to radiation oncology who recommended definitive intent fractionated external beam radiation therapy, will start this soon, already got fitted for his mask   -states he was told he just needs radiation, no chemo as it has not spread beyond his neck area      Groin lesions  -new issue  -started a couple months ago  -multiple lesions on groin, not itchy, do get painful at times  -some discharge comes out of them, which is white, does not smell bad   -denies any fever or chills     Past Medical History:      Diagnosis Date    Heart murmur     Neck mass        PastSurgical History:        Procedure Laterality Date    BRONCHOSCOPY N/A 6/28/2021    BRONCHOSCOPY ALVEOLAR LAVAGE performed by Geraldine Cain MD at [facility]. [place] [address] N/A 11/26/2021    OPEN BIOPSY OF SUBMENTAL NECK MASS WITH NERVE INTEGRITY MONITOR performed by Bridget Mota MD at [facility] OR       Allergies:    Patient has no known allergies. Social History:   Social History     Socioeconomic History    Marital status: Single     Spouse name: Not on file    Number of children: Not on file    Years of education: Not on file    Highest education level: Not on file   Occupational History    Not on file   Tobacco Use    Smoking status: Current Every Day Smoker     Packs/day: 0.25     Years: 5.00     Pack years: 1.25     Types: Cigarettes    Smokeless tobacco: Never Used   Vaping Use    Vaping Use: Never used   Substance and Sexual Activity    Alcohol use:  Yes Alcohol/week: 6.0 standard drinks     Types: 6 Cans of beer per week    Drug use: Yes     Types: Marijuana Duncan Lugo)    Sexual activity: Not on file   Other Topics Concern    Not on file   Social History Narrative    Not on file     Social Determinants of Health     Financial Resource Strain: Low Risk     Difficulty of Paying Living Expenses: Not very hard   Food Insecurity: No Food Insecurity    Worried About Running Out of Food in the Last Year: Never true    Marko of Food in the Last Year: Never true   Transportation Needs:     Lack of Transportation (Medical): Not on file    Lack of Transportation (Non-Medical): Not on file   Physical Activity:     Days of Exercise per Week: Not on file    Minutes of Exercise per Session: Not on file   Stress:     Feeling of Stress : Not on file   Social Connections:     Frequency of Communication with Friends and Family: Not on file    Frequency of Social Gatherings with Friends and Family: Not on file    Attends Pentecostal Services: Not on file    Active Member of [address] [place] [address] or Organizations: Not on file    Attends Club or Organization Meetings: Not on file    Marital Status: Not on file   Intimate Partner Violence:     Fear of Current or Ex-Partner: Not on file    Emotionally Abused: Not on file    Physically Abused: Not on file    Sexually Abused: Not on file   Housing Stability:     Unable to Pay for Housing in the Last Year: Not on file    Number of [place] in the Last Year: Not on file    Unstable Housing in the Last Year: Not on file        Family History:   No family history on file. Review of Systems:   Review of Systems - as above     Physical Exam   Vitals: /70   Pulse 77   Temp 97.3 °F (36.3 °C) (Infrared)   Resp 18   Ht 5' 10\" (1.778 m)   Wt 140 lb (63.5 kg)   SpO2 100%   BMI 20.09 kg/m²   Physical Exam  Constitutional:       Appearance: He is well-developed. HENT:      Head: Normocephalic and atraumatic.    Eyes: General:         Right eye: No discharge. Left eye: No discharge. Conjunctiva/sclera: Conjunctivae normal.   Neck:      Trachea: No tracheal deviation. Cardiovascular:      Rate and Rhythm: Normal rate and regular rhythm. Heart sounds: Normal heart sounds. Pulmonary:      Effort: Pulmonary effort is normal. No respiratory distress. Breath sounds: Normal breath sounds. No wheezing. Abdominal:      General: Bowel sounds are normal. There is no distension. Palpations: Abdomen is soft. Tenderness: There is no abdominal tenderness. Genitourinary:     Comments: Scortal lesions, raised, nontender to palpation, not draining, look warty in nature  Musculoskeletal:         General: No tenderness. Cervical back: Normal range of motion and neck supple. Skin:     General: Skin is warm and dry. Neurological:      Mental Status: He is alert. Psychiatric:         Behavior: Behavior normal.             Assessment and Plan       1. 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 Scrotal lesion  New issue  -Unclear etiology, looks like genital warts, however will refer to urology for possible biopsy, does not seem related to his Cheyanne Augustine MD, Urology, [facility]      Counseled regarding above diagnosis, including possible risks and complications,  especially if left uncontrolled. Counseled regarding the possible side effects, risks, benefits and alternatives to treatment;patient and/or guardian verbalizes understanding, agrees, feels comfortable with and wishes to proceed with above treatment plan. Call or go to [address] [place] if symptoms worsen or persist. Advised patient to call with any new medication issues, and, as applicable, read all Rx info from pharmacy to assure aware of all possible risks and side effects of medicationbefore taking.      Patient and/or guardian given opportunity to ask questions/raise concerns. The patient verbalized comfort and understanding ofinstructions. I encourage further reading and education about your health conditions. Information on many health conditions is provided by [facility] [employer]: [email or web address] [email or web address]  Please bring any questions to me at your nextvisit. Return to Office: Return in about 3 months (around 5/9/2022) for f/u HL . Medication List:    Current Outpatient Medications   Medication Sig Dispense Refill    cetirizine (ZYRTEC) 10 MG tablet take 1 tablet by mouth once daily (Patient not taking: Reported on 2/9/2022)      fluticasone (FLONASE) 50 MCG/ACT nasal spray 2 sprays by Nasal route daily (Patient not taking: Reported on 2/9/2022) 16 g 11    azelastine (ASTELIN) 0.1 % nasal spray 2 sprays by Nasal route 2 times daily Use in each nostril as directed (Patient not taking: Reported on 2/9/2022) 120 mL 1     No current facility-administered medications for this visit. Camilo Coulter, DO       This document may have been prepared at least partially through the use of voice recognition software. Although effort is taken to assure the accuracy ofthis document, it is possible that grammatical, syntax,  or spelling errors may occur.

## 2022-02-18 ENCOUNTER — TELEMEDICINE (OUTPATIENT)
Dept: PULMONOLOGY | Age: 22
End: 2022-02-18
Payer: MEDICAID

## 2022-02-18 DIAGNOSIS — J85.2 ABSCESS OF LUNG WITHOUT PNEUMONIA, UNSPECIFIED LATERALITY (HCC): Primary | ICD-10-CM

## 2022-02-18 PROCEDURE — 99423 OL DIG E/M SVC 21+ MIN: CPT | Performed by: INTERNAL MEDICINE
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
## 2022-02-22 ENCOUNTER — HOSPITAL ENCOUNTER (OUTPATIENT)
Dept: RADIATION ONCOLOGY | Age: 22
Discharge: HOME | End: 2022-02-22
Attending: RADIOLOGY
Payer: MEDICAID

## 2022-02-22 PROCEDURE — 77301 RADIOTHERAPY DOSE PLAN IMRT: CPT | Performed by: RADIOLOGY

## 2022-02-22 PROCEDURE — 77300 RADIATION THERAPY DOSE PLAN: CPT | Performed by: RADIOLOGY

## 2022-02-22 PROCEDURE — 77338 DESIGN MLC DEVICE FOR IMRT: CPT | Performed by: RADIOLOGY

## 2022-02-28 ENCOUNTER — HOSPITAL ENCOUNTER (OUTPATIENT)
Dept: RADIATION ONCOLOGY | Age: 22
Discharge: HOME | End: 2022-02-28
Attending: RADIOLOGY
Payer: MEDICAID

## 2022-02-28 PROCEDURE — 77386 HC NTSTY MODUL RAD TX DLVR CPLX: CPT | Performed by: RADIOLOGY

## 2022-02-28 PROCEDURE — 77014 PR CT GUIDANCE PLACEMENT RAD THERAPY FIELDS: CPT | Performed by: RADIOLOGY

## 2022-03-01 ENCOUNTER — HOSPITAL ENCOUNTER (OUTPATIENT)
Dept: RADIATION ONCOLOGY | Age: 22
Discharge: HOME | End: 2022-03-01
Attending: RADIOLOGY
Payer: MEDICAID

## 2022-03-01 PROCEDURE — 77386 HC NTSTY MODUL RAD TX DLVR CPLX: CPT | Performed by: RADIOLOGY

## 2022-03-02 ENCOUNTER — HOSPITAL ENCOUNTER (OUTPATIENT)
Dept: RADIATION ONCOLOGY | Age: 22
Discharge: HOME | End: 2022-03-02
Attending: RADIOLOGY
Payer: MEDICAID

## 2022-03-02 PROCEDURE — 77014 PR CT GUIDANCE PLACEMENT RAD THERAPY FIELDS: CPT | Performed by: RADIOLOGY

## 2022-03-02 PROCEDURE — 77386 HC NTSTY MODUL RAD TX DLVR CPLX: CPT | Performed by: RADIOLOGY

## 2022-03-03 ENCOUNTER — HOSPITAL ENCOUNTER (OUTPATIENT)
Dept: RADIATION ONCOLOGY | Age: 22
Discharge: HOME | End: 2022-03-03
Attending: RADIOLOGY
Payer: MEDICAID

## 2022-03-03 VITALS
HEART RATE: 87 BPM | RESPIRATION RATE: 18 BRPM | SYSTOLIC BLOOD PRESSURE: 100 MMHG | WEIGHT: 138.7 LBS | BODY MASS INDEX: 19.9 KG/M2 | OXYGEN SATURATION: 98 % | TEMPERATURE: 97.1 F | DIASTOLIC BLOOD PRESSURE: 74 MMHG

## 2022-03-03 PROCEDURE — 77014 PR CT GUIDANCE PLACEMENT RAD THERAPY FIELDS: CPT | Performed by: RADIOLOGY

## 2022-03-03 PROCEDURE — 77427 RADIATION TX MANAGEMENT X5: CPT | Performed by: RADIOLOGY

## 2022-03-03 PROCEDURE — 77386 HC NTSTY MODUL RAD TX DLVR CPLX: CPT | Performed by: RADIOLOGY

## 2022-03-03 NOTE — PROGRESS NOTES
Erasmo Rivera  3/3/2022  Wt Readings from Last 3 Encounters:   03/03/22 138 lb 11.2 oz (62.9 kg)   02/09/22 140 lb (63.5 kg)   01/21/22 139 lb 12.8 oz (63.4 kg)     Body mass index is 19.9 kg/m². Treatment Area:neck    Patient was seen today for weekly visit. Comfort Alteration  KPS:90%  Fatigue: Mild    Mucous Membrane Alteration  Mucositis Due to Radiation: Yes  Thrush: No  Voice Changes: No    Nutritional Alteration  Anorexia: No   Nausea: na   Vomiting: No     Skin Alteration   Sensation:using coconut oil    Radiation Dermatitis:  na    Ventilation Alteration  Cough:No    Emotional  Coping: effective    Injury, potential bleeding or infection: na    Radioprotectant Tolerance  No            Lab Results   Component Value Date    WBC 2.9 (L) 12/19/2021     12/19/2021         /74   Pulse 87   Temp 97.1 °F (36.2 °C) (Skin)   Resp 18   Wt 138 lb 11.2 oz (62.9 kg)   SpO2 98%   BMI 19.90 kg/m²   BP within normal range?  yes           Assessment/Plan: 4/15fx   800/3000cGY and tolerating    Zohra Escalante RN

## 2022-03-04 ENCOUNTER — HOSPITAL ENCOUNTER (OUTPATIENT)
Dept: RADIATION ONCOLOGY | Age: 22
Discharge: HOME | End: 2022-03-04
Attending: RADIOLOGY
Payer: MEDICAID

## 2022-03-04 PROCEDURE — 77014 PR CT GUIDANCE PLACEMENT RAD THERAPY FIELDS: CPT | Performed by: RADIOLOGY

## 2022-03-04 PROCEDURE — 77386 HC NTSTY MODUL RAD TX DLVR CPLX: CPT | Performed by: RADIOLOGY

## 2022-03-04 PROCEDURE — 77336 RADIATION PHYSICS CONSULT: CPT | Performed by: RADIOLOGY

## 2022-03-07 ENCOUNTER — HOSPITAL ENCOUNTER (OUTPATIENT)
Dept: RADIATION ONCOLOGY | Age: 22
Discharge: HOME | End: 2022-03-07
Attending: RADIOLOGY
Payer: MEDICAID

## 2022-03-07 PROCEDURE — 77386 HC NTSTY MODUL RAD TX DLVR CPLX: CPT | Performed by: RADIOLOGY

## 2022-03-07 PROCEDURE — 77014 PR CT GUIDANCE PLACEMENT RAD THERAPY FIELDS: CPT | Performed by: RADIOLOGY

## 2022-03-08 ENCOUNTER — HOSPITAL ENCOUNTER (OUTPATIENT)
Dept: RADIATION ONCOLOGY | Age: 22
Discharge: HOME | End: 2022-03-08
Attending: RADIOLOGY
Payer: MEDICAID

## 2022-03-08 PROCEDURE — 77386 HC NTSTY MODUL RAD TX DLVR CPLX: CPT | Performed by: RADIOLOGY

## 2022-03-08 PROCEDURE — 77014 PR CT GUIDANCE PLACEMENT RAD THERAPY FIELDS: CPT | Performed by: RADIOLOGY

## 2022-03-09 ENCOUNTER — HOSPITAL ENCOUNTER (OUTPATIENT)
Dept: RADIATION ONCOLOGY | Age: 22
Discharge: HOME | End: 2022-03-09
Attending: RADIOLOGY
Payer: MEDICAID

## 2022-03-09 VITALS
HEART RATE: 64 BPM | OXYGEN SATURATION: 99 % | DIASTOLIC BLOOD PRESSURE: 74 MMHG | RESPIRATION RATE: 18 BRPM | TEMPERATURE: 97.1 F | SYSTOLIC BLOOD PRESSURE: 110 MMHG | WEIGHT: 135.4 LBS | BODY MASS INDEX: 19.43 KG/M2

## 2022-03-09 DIAGNOSIS — C85.91 LYMPHOMA OF LYMPH NODES OF NECK, UNSPECIFIED LYMPHOMA TYPE (HCC): Primary | ICD-10-CM

## 2022-03-09 PROCEDURE — 99999 PR OFFICE/OUTPT VISIT,PROCEDURE ONLY: CPT | Performed by: RADIOLOGY

## 2022-03-09 PROCEDURE — 77014 PR CT GUIDANCE PLACEMENT RAD THERAPY FIELDS: CPT | Performed by: RADIOLOGY

## 2022-03-09 PROCEDURE — 77386 HC NTSTY MODUL RAD TX DLVR CPLX: CPT | Performed by: RADIOLOGY

## 2022-03-09 NOTE — PROGRESS NOTES
Erasmo CORTEZ Miguel  3/9/2022  Wt Readings from Last 3 Encounters:   03/09/22 135 lb 6.4 oz (61.4 kg)   03/03/22 138 lb 11.2 oz (62.9 kg)   02/09/22 140 lb (63.5 kg)     Body mass index is 19.43 kg/m². Treatment Area:CTV Head & Neck    Patient was seen today for weekly visit. Comfort Alteration  KPS:90%  Fatigue: Moderate    Mucous Membrane Alteration  Mucositis Due to Radiation: Yes  Thrush: No  Voice Changes: No    Nutritional Alteration  Anorexia: Yes   Nausea: No   Vomiting: No     Skin Alteration   Sensation:no    Radiation Dermatitis:  no    Ventilation Alteration  Cough:Yes, slight cough    Emotional  Coping: effective    Injury, potential bleeding or infection: no    Radioprotectant Tolerance  n/a            Lab Results   Component Value Date    WBC 2.9 (L) 12/19/2021     12/19/2021         /74   Pulse 64   Temp 97.1 °F (36.2 °C) (Temporal)   Resp 18   Wt 135 lb 6.4 oz (61.4 kg)   SpO2 99%   BMI 19.43 kg/m²   BP within normal range? yes   -if no, manually recheck in 5-10 min        Assessment/Plan:8/15fx 1600/3000cGy completed. Patient tolerating RT well. Lost 3.3lbs since last week, gave Ensure samples and encouraged to eat small meals and use Ensure or Pedialyte if not able to tolerate food.     Jose Maria Murcia RN

## 2022-03-09 NOTE — PATIENT INSTRUCTIONS
Continue daily fractionated radiation therapy as scheduled. Please see weekly OTV note and intial consultation letter in [facility] for clinical details. Clarice Vazquez. Cynthia Sheth MD MS Vianney Lockwoodjosé miguel:  [phone]   FAX: [phone]  [address] [place] [address]:  [phone]   FAX:    [phone]  [address] [place] [address] Road:  [phone]   FAX:  [phone]  Email: [email or web address]

## 2022-03-09 NOTE — PROGRESS NOTES
DEPARTMENT OF RADIATION ONCOLOGY ON TREATMENT VISIT         3/9/2022      NAME:  Alex Murphy    YOB: 2000    Diagnosis: HN CA    SUBJECTIVE:   Alex Murphy has now received fractionated external beam radiation therapy - ongoing. Past medical, surgical, social and family histories reviewed and updated as indicated. Pain: controlled    ALLERGIES:  Patient has no known allergies. Current Outpatient Medications   Medication Sig Dispense Refill    cetirizine (ZYRTEC) 10 MG tablet take 1 tablet by mouth once daily (Patient not taking: Reported on 2/9/2022)      fluticasone (FLONASE) 50 MCG/ACT nasal spray 2 sprays by Nasal route daily (Patient not taking: Reported on 2/9/2022) 16 g 11    azelastine (ASTELIN) 0.1 % nasal spray 2 sprays by Nasal route 2 times daily Use in each nostril as directed (Patient not taking: Reported on 2/9/2022) 120 mL 1     No current facility-administered medications for this encounter. OBJECTIVE:  Alert and fully ambulatory. Pleasant and conversant. Physical Examination: General appearance - alert, well appearing, and in no distress. Wt Readings from Last 3 Encounters:   03/09/22 135 lb 6.4 oz (61.4 kg)   03/03/22 138 lb 11.2 oz (62.9 kg)   02/09/22 140 lb (63.5 kg)         ASSESSMENT/PLAN:     Patient is tolerating treatments well with expected toxicities. RBA were reviewed prior to first fraction and PRN. Current and planned dose reviewed. Goals of treatment and potential side effects were reviewed with the patient PRN. Treatment imaging has been personally reviewed for accuracy and precision. Questions answered to apparent satisfaction. Treatments will continue as planned. Clarice Vazquez.  Cynthia Sheth MD MS JOSHR  Radiation Oncologist        PHYSICIANS [facility] ([address] [place] [address]): [phone] /// FAX: [phone]  [facility]): [phone] /// FAX: [phone]  [address] [place] [address]): [phone] /// FAX: [phone]

## 2022-03-10 ENCOUNTER — HOSPITAL ENCOUNTER (OUTPATIENT)
Dept: RADIATION ONCOLOGY | Age: 22
Discharge: HOME | End: 2022-03-10
Attending: RADIOLOGY
Payer: MEDICAID

## 2022-03-10 PROCEDURE — 77386 HC NTSTY MODUL RAD TX DLVR CPLX: CPT | Performed by: RADIOLOGY

## 2022-03-10 PROCEDURE — 77427 RADIATION TX MANAGEMENT X5: CPT | Performed by: RADIOLOGY

## 2022-03-10 PROCEDURE — 77014 PR CT GUIDANCE PLACEMENT RAD THERAPY FIELDS: CPT | Performed by: RADIOLOGY

## 2022-03-11 ENCOUNTER — HOSPITAL ENCOUNTER (OUTPATIENT)
Dept: RADIATION ONCOLOGY | Age: 22
Discharge: HOME | End: 2022-03-11
Attending: RADIOLOGY
Payer: MEDICAID

## 2022-03-11 PROCEDURE — 77386 HC NTSTY MODUL RAD TX DLVR CPLX: CPT | Performed by: RADIOLOGY

## 2022-03-11 PROCEDURE — 77014 PR CT GUIDANCE PLACEMENT RAD THERAPY FIELDS: CPT | Performed by: RADIOLOGY

## 2022-03-11 PROCEDURE — 77336 RADIATION PHYSICS CONSULT: CPT | Performed by: RADIOLOGY

## 2022-03-14 ENCOUNTER — HOSPITAL ENCOUNTER (OUTPATIENT)
Dept: RADIATION ONCOLOGY | Age: 22
Discharge: HOME | End: 2022-03-14
Attending: RADIOLOGY
Payer: MEDICAID

## 2022-03-14 PROCEDURE — 77386 HC NTSTY MODUL RAD TX DLVR CPLX: CPT | Performed by: RADIOLOGY

## 2022-03-14 PROCEDURE — 77014 PR CT GUIDANCE PLACEMENT RAD THERAPY FIELDS: CPT | Performed by: RADIOLOGY

## 2022-03-15 ENCOUNTER — HOSPITAL ENCOUNTER (OUTPATIENT)
Dept: RADIATION ONCOLOGY | Age: 22
Discharge: HOME | End: 2022-03-15
Attending: RADIOLOGY
Payer: MEDICAID

## 2022-03-15 PROCEDURE — 77014 PR CT GUIDANCE PLACEMENT RAD THERAPY FIELDS: CPT | Performed by: RADIOLOGY

## 2022-03-15 PROCEDURE — 77386 HC NTSTY MODUL RAD TX DLVR CPLX: CPT | Performed by: RADIOLOGY

## 2022-03-16 ENCOUNTER — HOSPITAL ENCOUNTER (OUTPATIENT)
Dept: RADIATION ONCOLOGY | Age: 22
Discharge: HOME | End: 2022-03-16
Attending: RADIOLOGY
Payer: MEDICAID

## 2022-03-16 VITALS
DIASTOLIC BLOOD PRESSURE: 78 MMHG | WEIGHT: 129.9 LBS | BODY MASS INDEX: 18.64 KG/M2 | HEART RATE: 86 BPM | OXYGEN SATURATION: 98 % | TEMPERATURE: 98.1 F | SYSTOLIC BLOOD PRESSURE: 110 MMHG | RESPIRATION RATE: 18 BRPM

## 2022-03-16 DIAGNOSIS — C85.91 LYMPHOMA OF LYMPH NODES OF NECK, UNSPECIFIED LYMPHOMA TYPE (HCC): Primary | ICD-10-CM

## 2022-03-16 PROCEDURE — 99999 PR OFFICE/OUTPT VISIT,PROCEDURE ONLY: CPT | Performed by: RADIOLOGY

## 2022-03-16 PROCEDURE — 77386 HC NTSTY MODUL RAD TX DLVR CPLX: CPT | Performed by: RADIOLOGY

## 2022-03-16 PROCEDURE — 77014 PR CT GUIDANCE PLACEMENT RAD THERAPY FIELDS: CPT | Performed by: RADIOLOGY

## 2022-03-16 ASSESSMENT — PAIN SCALES - GENERAL: PAINLEVEL_OUTOF10: 8

## 2022-03-16 ASSESSMENT — PAIN DESCRIPTION - LOCATION: LOCATION: MOUTH

## 2022-03-16 NOTE — PROGRESS NOTES
Erasmo CORTEZ Miguel  3/16/2022  Wt Readings from Last 3 Encounters:   03/16/22 129 lb 14.4 oz (58.9 kg)   03/09/22 135 lb 6.4 oz (61.4 kg)   03/03/22 138 lb 11.2 oz (62.9 kg)     Body mass index is 18.64 kg/m². Treatment area CTV H/N    Patient was seen today for weekly visit. Comfort Alteration  KPS:90%  Fatigue: Mild    Mucous Membrane Alteration  Mucositis Due to Radiation: No  Thrush: No  Voice Changes: No    Nutritional Alteration  Anorexia: Yes , dietician on board  Nausea: No   Vomiting: No     Skin Alteration   Sensation:good    Radiation Dermatitis:  na    Ventilation Alteration  Cough:No    Emotional  Coping: effective    Injury, potential bleeding or infection: na    Radioprotectant Tolerance  No            Lab Results   Component Value Date    WBC 2.9 (L) 12/19/2021     12/19/2021         /78   Pulse 86   Temp 98.1 °F (36.7 °C) (Skin)   Resp 18   Wt 129 lb 14.4 oz (58.9 kg)   SpO2 98%   BMI 18.64 kg/m²   BP within normal range? yes           Assessment/Plan:13/15fx and tolerating, mouth pain.     Beth Beth RN

## 2022-03-16 NOTE — PROGRESS NOTES
DEPARTMENT OF RADIATION ONCOLOGY ON TREATMENT VISIT         3/16/2022      NAME:  German Thomas    YOB: 2000    Diagnosis: HN CA    SUBJECTIVE:   German Thomas has now received fractionated external beam radiation therapy - ongoing. Past medical, surgical, social and family histories reviewed and updated as indicated. Pain: controlled    ALLERGIES:  Patient has no known allergies. Current Outpatient Medications   Medication Sig Dispense Refill    Magic Mouthwash (MIRACLE MOUTHWASH) Swish and spit 5 mLs 4 times daily as needed for Irritation      cetirizine (ZYRTEC) 10 MG tablet take 1 tablet by mouth once daily (Patient not taking: Reported on 2/9/2022)      fluticasone (FLONASE) 50 MCG/ACT nasal spray 2 sprays by Nasal route daily (Patient not taking: Reported on 2/9/2022) 16 g 11    azelastine (ASTELIN) 0.1 % nasal spray 2 sprays by Nasal route 2 times daily Use in each nostril as directed (Patient not taking: Reported on 2/9/2022) 120 mL 1     No current facility-administered medications for this encounter. OBJECTIVE:  Alert and fully ambulatory. Pleasant and conversant. Physical Examination: General appearance - alert, well appearing, and in no distress. Wt Readings from Last 3 Encounters:   03/16/22 129 lb 14.4 oz (58.9 kg)   03/09/22 135 lb 6.4 oz (61.4 kg)   03/03/22 138 lb 11.2 oz (62.9 kg)         ASSESSMENT/PLAN:     Patient is tolerating treatments well with expected toxicities. RBA were reviewed prior to first fraction and PRN. Current and planned dose reviewed. Goals of treatment and potential side effects were reviewed with the patient PRN. Treatment imaging has been personally reviewed for accuracy and precision. Questions answered to apparent satisfaction. Treatments will continue as planned. Jenny Sethi.  Arian Pearson MD [facility]  Radiation Oncologist        [facility] ([address] [place] [address]): [phone] /// FAX: [phone]  [facility]): [phone] /// FAX: [phone]  [address] [place] [address] Adonay Tamayo): [phone] /// FAX: [phone]

## 2022-03-16 NOTE — PATIENT INSTRUCTIONS
Continue daily fractionated radiation therapy as scheduled. Please see weekly OTV note and intial consultation letter in [facility] for clinical details. Nabil Granger. Althea Pastrana MD MS Demetra Carrel:  [phone]   FAX: [phone]  [facility]:  [phone]   FAX:    [phone]  [address] [place] [address] Road:  [phone]   FAX:  [phone]  Email: [email or web address]

## 2022-03-17 ENCOUNTER — HOSPITAL ENCOUNTER (OUTPATIENT)
Dept: RADIATION ONCOLOGY | Age: 22
Discharge: HOME | End: 2022-03-17
Attending: RADIOLOGY
Payer: MEDICAID

## 2022-03-17 PROCEDURE — 77014 PR CT GUIDANCE PLACEMENT RAD THERAPY FIELDS: CPT | Performed by: RADIOLOGY

## 2022-03-17 PROCEDURE — 77386 HC NTSTY MODUL RAD TX DLVR CPLX: CPT | Performed by: RADIOLOGY

## 2022-03-18 ENCOUNTER — TELEPHONE (OUTPATIENT)
Dept: RADIATION ONCOLOGY | Age: 22
End: 2022-03-18

## 2022-03-18 ENCOUNTER — HOSPITAL ENCOUNTER (OUTPATIENT)
Dept: RADIATION ONCOLOGY | Age: 22
Discharge: HOME | End: 2022-03-18
Attending: RADIOLOGY
Payer: MEDICAID

## 2022-03-18 PROCEDURE — 77386 HC NTSTY MODUL RAD TX DLVR CPLX: CPT | Performed by: RADIOLOGY

## 2022-03-18 PROCEDURE — 77336 RADIATION PHYSICS CONSULT: CPT | Performed by: RADIOLOGY

## 2022-03-18 PROCEDURE — 77014 PR CT GUIDANCE PLACEMENT RAD THERAPY FIELDS: CPT | Performed by: RADIOLOGY

## 2022-03-18 PROCEDURE — 77427 RADIATION TX MANAGEMENT X5: CPT | Performed by: RADIOLOGY

## 2022-03-18 NOTE — TELEPHONE ENCOUNTER
Erasmo CORTEZ Miguel  3/18/2022  Wt Readings from Last 10 Encounters:   03/16/22 129 lb 14.4 oz (58.9 kg)   03/09/22 135 lb 6.4 oz (61.4 kg)   03/03/22 138 lb 11.2 oz (62.9 kg)   02/09/22 140 lb (63.5 kg)   01/21/22 139 lb 12.8 oz (63.4 kg)   12/19/21 130 lb (59 kg)   11/26/21 139 lb (63 kg)   11/05/21 137 lb (62.1 kg)   10/22/21 141 lb 12.8 oz (64.3 kg)   06/26/21 130 lb (59 kg)     Ht Readings from Last 1 Encounters:   02/09/22 5' 10\" (1.778 m)     Contacted pt per referral for wt loss and anorexia. Pt completed XRT to [facility] today for Hodgkin's lymphoma. He notes ongoing dysgeusia. He has been trying to drink 1-2 Ensure daily, consuming 1-2 small meals as able. He admits warm foods have a more appealing flavor to him than cold foods at this time. He has been mainly consuming soups. Discussed tips for increasing kcal/pro content of soups, as well as other options for warm, soft foods such as oatmeal w/ CIB. Pt denies any issues w/ N/V/D/C, consistently drinking water throughout the day. Provided pt w/ recipes and techniques for increased nutrient consumption. Pt appreciative of assistance, contact info provided. Will follow as needed.     Weight change: -9# x ~2 weeks (6.5%)  Appetite: poor  N/V/D/C: none noted  Calculated Needs if applicable: 22-72 kcal/kg CBW =  kcal, 1.3-1.5 gm/kg CBW = 75-90 gm pro, 1 ml/kcal =  ml fluids    Pre-Hab Eligible?: No    Recommendations:Pt to consume 5-6 small meals daily w/ 2-3 Ensure Complete as able    ASPEN GUIDELINES FOR CLINICAL CHARACTERISTICS OF MALNUTRITION IN CHRONIC ILLNESS     Moderate Malnutrition  Severe Malnutrition    Energy intake  <75% energy intake compared to estimated needs for >1month <75% energy intake compared to estimated needs for >1month   Weight changes  5% x 1 month  7.5% x 3 months   10% x 6 months   20% x 1 year  >5% x 1 month  >7.5% x 3 months   >10% x 6 months   >20% x 1 year    Physical findings  Mild   Decrease subcutaneous fat    Decrease muscle mass     Increase fluid/edema   Severe  Decrease subcutaneous fat    Decrease muscle mass     Increase fluid/edema    Pt severely malnourished w/ 6.5% wt loss x 2 weeks and ongoing minimal PO intake    Carlyle Loza, MS, RD, LD'

## 2022-03-18 NOTE — PROGRESS NOTES
Erasmo Greene  3/18/2022  7:36 AM          Current Outpatient Medications   Medication Sig Dispense Refill    Magic Mouthwash (MIRACLE MOUTHWASH) Swish and spit 5 mLs 4 times daily as needed for Irritation      cetirizine (ZYRTEC) 10 MG tablet take 1 tablet by mouth once daily (Patient not taking: Reported on 2/9/2022)      fluticasone (FLONASE) 50 MCG/ACT nasal spray 2 sprays by Nasal route daily (Patient not taking: Reported on 2/9/2022) 16 g 11    azelastine (ASTELIN) 0.1 % nasal spray 2 sprays by Nasal route 2 times daily Use in each nostril as directed (Patient not taking: Reported on 2/9/2022) 120 mL 1     No current facility-administered medications for this encounter. This is an up-to-date medication list.    Please take this list to your next care provider, and discard any previous medication lists.

## 2022-04-28 ENCOUNTER — TELEPHONE (OUTPATIENT)
Dept: CASE MANAGEMENT | Age: 22
End: 2022-04-28

## 2022-04-28 ENCOUNTER — HOSPITAL ENCOUNTER (OUTPATIENT)
Dept: RADIATION ONCOLOGY | Age: 22
Discharge: HOME | End: 2022-04-28
Attending: RADIOLOGY

## 2022-04-28 VITALS
SYSTOLIC BLOOD PRESSURE: 122 MMHG | TEMPERATURE: 98.1 F | HEART RATE: 86 BPM | DIASTOLIC BLOOD PRESSURE: 78 MMHG | WEIGHT: 145.7 LBS | BODY MASS INDEX: 20.91 KG/M2 | RESPIRATION RATE: 18 BRPM | OXYGEN SATURATION: 98 %

## 2022-04-28 NOTE — PROGRESS NOTES
DEPARTMENT OF RADIATION ONCOLOGY   Follow up visit        2022    NAME:  Sandor Pugh    :  2000 24 y.o. male     PCP: Eva Byrne DO    REFERRING PROVIDER: Lina Eugene    DIAGNOSIS:  No diagnosis found. STAGING: Cancer Staging  No matching staging information was found for the patient. RECENT HISTORY: Sandor Pugh returns for a post radiation treatment follow up visit. Treatments were completed on 2022. 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 Kendrick Donohue He returns today feeling well. He has gained 16 pounds since completing therapy and is eating most foods without difficulty. Still having some difficulty with very spicy foods. Has some minor xerostomia and taste alteration. Denies any \"B\" symptoms. He is unsure about his follow-up with Dr. Lina Eugene.  He has no further restaging studies yet scheduled. Past medical, surgical, social and family histories reviewed and updated as indicated. ALLERGIES:  Patient has no known allergies. MEDICATIONS:   Current Outpatient Medications:     Magic Mouthwash (MIRACLE MOUTHWASH), Swish and spit 5 mLs 4 times daily as needed for Irritation, Disp: , Rfl:     cetirizine (ZYRTEC) 10 MG tablet, take 1 tablet by mouth once daily (Patient not taking: Reported on 2022), Disp: , Rfl:     fluticasone (FLONASE) 50 MCG/ACT nasal spray, 2 sprays by Nasal route daily (Patient not taking: Reported on 2022), Disp: 16 g, Rfl: 11    azelastine (ASTELIN) 0.1 % nasal spray, 2 sprays by Nasal route 2 times daily Use in each nostril as directed (Patient not taking: Reported on 2022), Disp: 120 mL, Rfl: 1    REVIEW OF SYSTEMS: Obtained from the patient, chart review and nursing assessment.   12 system review negative except     PHYSICAL EXAMINATION:    Vitals:    22 0907   BP: 122/78   Pulse: 86   Resp: 18   Temp: 98.1 °F (36.7 °C) TempSrc: Skin   SpO2: 98%   Weight: 145 lb 11.2 oz (66.1 kg)       Wt Readings from Last 3 Encounters:   04/28/22 145 lb 11.2 oz (66.1 kg)   03/16/22 129 lb 14.4 oz (58.9 kg)   03/09/22 135 lb 6.4 oz (61.4 kg)       YULIASKVEL PERFORMACE STATUS: 100    Constitutional: A well developed, well nourished 24 y.o. male who is alert, oriented, cooperative and in no apparent distress. EENT: EOMI KATRINA. MMM. No icterus. No conjunctival injections. External canals are patent and no discharge was appreciated. Septum was midline, mucosa was without erythema, exudates or cobblestoning. Neck: Supple without thyromegaly. No elevated JVP. Trachea was midline. No carotid bruits. No stridor or subglottic wheeze. ASSESSMENT/PLAN: Stage I lymphocyte predominant Hodgkin's disease status post involved site radiation therapy (March 18, 2022, 3000 cGy). At this point he is doing well and is without evidence of active disease. We will order a PET scan to be obtained in approximately 2 months (3 months posttreatment) and we will see him for follow-up soon thereafter to go over the results. He was encouraged to continue regular follow-up with Dr. Paula Gimenez as well. I asked Rin Holt  to contact us at any time for any questions or concerns. Thank you for the opportunity to participate in multidisciplinary management of this remarkable and pleasant patient. Abel Mart M.D.     Department of Radiation Oncology  [facility]) [facility]: [phone] (AUD: [phone])  [address] [place] [address]: [phone] (DJK: [phone])  [facility]) [facility]:  [phone] (VXM:  [phone])

## 2022-04-28 NOTE — PROGRESS NOTES
Erasmo Houston Primer  4/28/2022  9:10 AM      Vitals:    04/28/22 0907   BP: 122/78   Pulse: 86   Resp: 18   Temp: 98.1 °F (36.7 °C)   SpO2: 98%    : Wt Readings from Last 3 Encounters:   04/28/22 145 lb 11.2 oz (66.1 kg)   03/16/22 129 lb 14.4 oz (58.9 kg)   03/09/22 135 lb 6.4 oz (61.4 kg)                Current Outpatient Medications:     Magic Mouthwash (MIRACLE MOUTHWASH), Swish and spit 5 mLs 4 times daily as needed for Irritation, Disp: , Rfl:     cetirizine (ZYRTEC) 10 MG tablet, take 1 tablet by mouth once daily (Patient not taking: Reported on 2/9/2022), Disp: , Rfl:     fluticasone (FLONASE) 50 MCG/ACT nasal spray, 2 sprays by Nasal route daily (Patient not taking: Reported on 2/9/2022), Disp: 16 g, Rfl: 11    azelastine (ASTELIN) 0.1 % nasal spray, 2 sprays by Nasal route 2 times daily Use in each nostril as directed (Patient not taking: Reported on 2/9/2022), Disp: 120 mL, Rfl: 1      Patient is seen today in follow up with Dr. Vikash Sanz    Instructions:  Assess the patient and enter the appropriate indicators that are present for fall risk identification. Total the numbers entered and assign a fall risk score from Table 2.  Reassess patient at a minimum every 12 weeks or with status change. Assessment   Date  4/28/2022     1. Mental Ability: confusion/cognitively impaired No - 0       2. Elimination Issues: incontinence, frequency No - 0       3. Ambulatory: use of assistive devices (walker, cane, off-loading devices), attached to equipment (IV pole, oxygen) No - 0     4. Sensory Limitations: dizziness, vertigo, impaired vision No - 0       5. Age Less than 65 years - 0       6. Medication: diuretics, strong analgesics, hypnotics, sedatives, antihypertensive agents   No - 0   7. Falls:  recent history of falls within the last 3 months (not to include slipping or tripping)   No - 0   TOTAL 0    If score of 4 or greater was education given?  No       TABLE 2   Risk Score Risk Level Plan of Care   0-3 Little or  No Risk 1. Provide assistance as indicated for ambulation activities  2. Reorient confused/cognitively impaired patient  3. Call-light/bell within patient's reach  4. Chair/bed in low position, stretcher/bed with siderails up except when performing patient care activities  5. Educate patient/family/caregiver on falls prevention  6.  Reassess in 12 weeks or with any noted change in patient condition which places them at a risk for a fall   4-6 Moderate Risk 1. Provide assistance as indicated for ambulation activities  2. Reorient confused/cognitively impaired patient  3. Call-light/bell within patient's reach  4. Chair/bed in low position, stretcher/bed with siderails up except when performing patient care activities  5. Educate patient/family/caregiver on falls prevention  6. Falls risk precaution (Yellow sticker Level II) placed on patient chart   7 or   Higher High Risk 1. Place patient in easily observable treatment room  2. Patient attended at all times by family member or staff  3. Provide assistance as indicated for ambulation activities  4. Reorient confused/cognitively impaired patient  5. Call-light/bell within patient's reach  6. Chair/bed in low position, stretcher/bed with siderails up except when performing patient care activities  7. Educate patient/family/caregiver on falls prevention  8. Falls risk precaution (Yellow sticker Level III) placed on patient chart           MALNUTRITION RISK SCREENING ASSESSMENT    4/28/2022   Patient:  Juma Pulido  Sex:  male    Instructions:  Assess the patient and enter the appropriate indicators that are present for nutrition risk identification. Total the numbers entered and assign a risk score. Follow the appropriate action for total score listed below. Assessment   Date  4/28/2022     1. Have you lost weight without trying? 0- No     2.  Have you been eating poorly because of a decreased appetite? 0- No   3. Do you have a diagnosis of head and neck cancer?       0- No                                                                                    TOTAL 0          Score of 0-1: No action  Score 2 or greater:  · For Non-Diabetic Patient: Recommend adding Ensure Complete 2 x daily and provide patient with Ensure wellness bag with coupons  · For Diabetic Patient: Recommend adding Glucerna Shake 2 x daily and provide patient with Glucerna Wellness bag with coupons  · Route to the dietitian via Chitra Mcgee RN

## 2022-04-28 NOTE — TELEPHONE ENCOUNTER
Met with patient prior to his follow up appointment with Dr. Tila Carroll today. Patient completed his active treatment March 2022 for his Lymphoma cancer. Patient appears in good spirits. States he is doing well. He denies any issues with swallowing and eating. Provided support and encouragement. Patient has not followed up with Dr Gely Escalante for Medical Oncology. Reviewed importance of following with Medical Oncology for follow ups, scans and blood work. Provided with Dr Sulma Blood office number to schedule follow up as soon as possible. Instructed patient in detail on his Cancer Treatment Summary and Survivorship Care Plan. Copies electronically sent to patient's PCP. Provided written copies of Treatment Summary/Survivorship Care Plan, Patient Resource: Cancer Survivorship: A Guide for Patients and Their Families booklet, [facility] [employer] and Thriving and Surviving program handouts. Patient will call to schedule follow up with Medical Oncology. Instructed to call with any questions or concerns. Verbally agreed. Patient appreciative of visit and information.

## 2022-06-10 ENCOUNTER — HOSPITAL ENCOUNTER (EMERGENCY)
Age: 22
Discharge: HOME | End: 2022-06-10
Payer: MEDICAID

## 2022-06-10 ENCOUNTER — APPOINTMENT (OUTPATIENT)
Dept: GENERAL RADIOLOGY | Age: 22
End: 2022-06-10
Payer: MEDICAID

## 2022-06-10 VITALS
SYSTOLIC BLOOD PRESSURE: 103 MMHG | TEMPERATURE: 96.9 F | OXYGEN SATURATION: 100 % | RESPIRATION RATE: 18 BRPM | WEIGHT: 140 LBS | HEART RATE: 92 BPM | BODY MASS INDEX: 20.09 KG/M2 | DIASTOLIC BLOOD PRESSURE: 72 MMHG

## 2022-06-10 DIAGNOSIS — Z23 NEED FOR TETANUS BOOSTER: ICD-10-CM

## 2022-06-10 DIAGNOSIS — S61.411A LACERATION OF RIGHT HAND WITHOUT FOREIGN BODY, INITIAL ENCOUNTER: Primary | ICD-10-CM

## 2022-06-10 PROCEDURE — 12001 RPR S/N/AX/GEN/TRNK 2.5CM/<: CPT

## 2022-06-10 PROCEDURE — 73130 X-RAY EXAM OF HAND: CPT

## 2022-06-10 PROCEDURE — 99283 EMERGENCY DEPT VISIT LOW MDM: CPT

## 2022-06-10 PROCEDURE — 2500000003 HC RX 250 WO HCPCS: Performed by: NURSE PRACTITIONER

## 2022-06-10 RX ORDER — TETANUS AND DIPHTHERIA TOXOIDS ADSORBED 2; 2 [LF]/.5ML; [LF]/.5ML
0.5 INJECTION INTRAMUSCULAR ONCE
Status: DISCONTINUED | OUTPATIENT
Start: 2022-06-10 | End: 2022-06-10 | Stop reason: HOSPADM

## 2022-06-10 RX ORDER — LIDOCAINE HYDROCHLORIDE 10 MG/ML
5 INJECTION, SOLUTION INFILTRATION; PERINEURAL ONCE
Status: COMPLETED | OUTPATIENT
Start: 2022-06-10 | End: 2022-06-10

## 2022-06-10 RX ORDER — IBUPROFEN 800 MG/1
800 TABLET ORAL ONCE
Status: DISCONTINUED | OUTPATIENT
Start: 2022-06-10 | End: 2022-06-10 | Stop reason: HOSPADM

## 2022-06-10 RX ADMIN — LIDOCAINE HYDROCHLORIDE 5 ML: 10 INJECTION, SOLUTION INFILTRATION; PERINEURAL at 18:01

## 2022-06-10 ASSESSMENT — PAIN DESCRIPTION - FREQUENCY: FREQUENCY: CONTINUOUS

## 2022-06-10 ASSESSMENT — PAIN DESCRIPTION - LOCATION: LOCATION: FINGER (COMMENT WHICH ONE)

## 2022-06-10 ASSESSMENT — PAIN DESCRIPTION - DESCRIPTORS: DESCRIPTORS: BURNING

## 2022-06-10 ASSESSMENT — PAIN - FUNCTIONAL ASSESSMENT: PAIN_FUNCTIONAL_ASSESSMENT: 0-10

## 2022-06-10 ASSESSMENT — PAIN SCALES - GENERAL: PAINLEVEL_OUTOF10: 3

## 2022-06-10 ASSESSMENT — PAIN DESCRIPTION - PAIN TYPE: TYPE: ACUTE PAIN

## 2022-06-10 ASSESSMENT — PAIN DESCRIPTION - ORIENTATION: ORIENTATION: RIGHT

## 2022-06-10 ASSESSMENT — PAIN DESCRIPTION - ONSET: ONSET: ON-GOING

## 2022-06-10 NOTE — ED PROVIDER NOTES
[facility]  Department of Emergency Medicine   ED  Encounter Note  Admit Date/RoomTime: 6/10/2022  4:47 PM  ED Room: [address]    NAME: Dinora Graff  : 2000  MRN: [id number]     Chief Complaint:  Hand Injury (playing basketball hurt ring finger)    History of Present Illness       Erasmo Cummings is a 24 y.o. old male presenting to the emergency department by private vehicle, for traumatic Right hand pain which occured several minute(s) prior to arrival.  The complaint is due to was playing basketball and tangled up with another patient. He is left handed. Patient has no prior history of pain/injury with regards to today's visit. The patients tetanus status is unknown. Since onset the symptoms have been stable. His pain is aggraveated by any movement and relieved by nothing, as no treatment has been provided prior to this visit. ROS   Pertinent positives and negatives are stated within HPI, all other systems reviewed and are negative. Past Medical History:  has a past medical history of Heart murmur, Hodgkin lymphoma ([facility] [address] and Neck mass. Surgical History:  has a past surgical history that includes bronchoscopy (N/A, 2021) and Neck surgery (N/A, 2021). Social History:  reports that he has been smoking cigarettes. He has a 1.25 pack-year smoking history. He has never used smokeless tobacco. He reports current alcohol use of about 6.0 standard drinks of alcohol per week. He reports current drug use. Drug: Marijuana [place]). Family History: family history is not on file. Allergies: Patient has no known allergies.     Physical Exam   Oxygen Saturation Interpretation: Normal.        ED Triage Vitals   BP Temp Temp src Heart Rate Resp SpO2 Height Weight   06/10/22 1646 06/10/22 1601 -- 06/10/22 1601 06/10/22 1645 06/10/22 1601 -- 06/10/22 1645   103/72 96.9 °F (36.1 °C)  92 18 100 %  140 lb (63.5 kg)         Constitutional:  Alert, development consistent with age. Neck:  Normal ROM. Supple. Non-tender. Hand: Right webspace between 3rd and fourth digits            Tenderness: mild. Swelling: None. Deformity: no deformity observed/palpated. Skin:  1.5 cm laceration. Neurovascular: Motor deficit: none. Sensory deficit:   none. Pulse deficit: none. Capillary refill: normal.  Fingers:  all            Tenderness:  mild. Swelling: None. Deformity: no deformity observed/palpated. ROM: full range of motion. Skin:  no wounds, erythema, or swelling. Wrist:  diffusely across carpal bones. Tenderness:  none. Swelling: None. Deformity: no deformity observed/palpated. ROM: full range of motion. Skin: no wounds, erythema, or swelling. Lymphatics: No lymphangitis or adenopathy noted. Neurological:  Oriented. Motor functions intact. Lab / Imaging Results   (All laboratory and radiology results have been personally reviewed by myself)  Labs:  No results found for this visit on 06/10/22. Imaging: All Radiology results interpreted by Radiologist unless otherwise noted. XR HAND RIGHT (MIN 3 VIEWS)   Final Result   No acute osseous abnormality. ED Course / Medical Decision Making     Medications   diptheria-tetanus toxoids [facility]) 2-2 LF/0.5ML injection 0.5 mL (0.5 mLs IntraMUSCular Not Given 6/10/22 1800)   ibuprofen (ADVIL;MOTRIN) tablet 800 mg (800 mg Oral Not Given 6/10/22 1801)   lidocaine 1 % injection 5 mL (5 mLs IntraDERmal Given by Other 6/10/22 1801)        Consult(s):   None    Procedure(s):  PROCEDURE NOTE  6/10/22       Time: 1810    LACERATION REPAIR  Risks, benefits and alternatives (for applicable procedures below) described. Performed By: DARWIN Payne CNP. Informed consent: Verbal consent obtained.   The patient was counseled regarding the procedure in person, it's indications, risks, potential complications and alternatives and any questions were answered. Verbal consent was obtained. Laceration #: 1. Location: webspace between 3rd and 4th digit. Length: 1.5 cm. The wound area was irrigated with sterile saline, cleansed with povidone iodine scrub and draped in a sterile fashion. Local Anesthesia:  obtained with Lidocaine 1% without epinephrine. The wound was explored with the following results: Thickness: partial thickness. no foreign body or tendon injury seen. Debridement: full thickness. Undermining: None. Wound Margins Revised: no.  Flaps Aligned: yes. The wound was closed in single layer closure with #4  4-0 Prolene using interrupted suture(s). Dressing:  bacitracin and a sterile dressing. There were no additional wounds requiring formal closure. MDM: Imaging was obtained based on moderate suspicion for fracture / bony abnormality, dislocation as per history/physical findings, negative, wound cleansed irrigated and closed without difficulty. Tetanus updated. Plan is subsequently for symptom control, limited use as tolerated and  outpatient follow-up with PCP for wound recheck and suture removal. Patient educated on signs and symptoms which require emergent evaluation. Plan of Care/Counseling:  DARWIN Levine CNP reviewed today's visit with the patient in addition to providing specific details for the plan of care and counseling regarding the diagnosis and prognosis. Questions are answered at this time and are agreeable with the plan. Assessment      1. Laceration of right hand without foreign body, initial encounter    2. Need for tetanus booster      Plan   Discharged home.   Patient condition is good    New Medications     Discharge Medication List as of 6/10/2022  5:38 PM        Electronically signed by DARWIN Levine CNP   DD: 6/10/22  **This report was transcribed using voice recognition software. Every effort was made to ensure accuracy; however, inadvertent computerized transcription errors may be present.   END OF ED PROVIDER NOTE      DARWIN Mann - CANDIEC  06/10/22 5696

## 2022-07-11 ENCOUNTER — TELEPHONE (OUTPATIENT)
Dept: PULMONOLOGY | Age: 22
End: 2022-07-11

## 2022-08-29 ENCOUNTER — HOSPITAL ENCOUNTER (OUTPATIENT)
Dept: CT IMAGING | Age: 22
Discharge: HOME | End: 2022-08-31

## 2022-08-29 DIAGNOSIS — J98.4 PULMONARY CAVITARY LESION: ICD-10-CM

## 2022-09-30 ENCOUNTER — HOSPITAL ENCOUNTER (EMERGENCY)
Age: 22
Discharge: HOME | End: 2022-09-30
Attending: EMERGENCY MEDICINE
Payer: MEDICAID

## 2022-09-30 ENCOUNTER — APPOINTMENT (OUTPATIENT)
Dept: CT IMAGING | Age: 22
End: 2022-09-30
Payer: MEDICAID

## 2022-09-30 VITALS
HEIGHT: 70 IN | TEMPERATURE: 97.7 F | SYSTOLIC BLOOD PRESSURE: 111 MMHG | WEIGHT: 10 LBS | OXYGEN SATURATION: 97 % | DIASTOLIC BLOOD PRESSURE: 71 MMHG | HEART RATE: 71 BPM | BODY MASS INDEX: 1.43 KG/M2 | RESPIRATION RATE: 12 BRPM

## 2022-09-30 DIAGNOSIS — R10.9 ABDOMINAL PAIN, UNSPECIFIED ABDOMINAL LOCATION: Primary | ICD-10-CM

## 2022-09-30 DIAGNOSIS — K52.9 COLITIS: ICD-10-CM

## 2022-09-30 LAB
ALBUMIN SERPL-MCNC: 5 G/DL (ref 3.5–5.2)
ALP BLD-CCNC: 120 U/L (ref 40–129)
ALT SERPL-CCNC: 13 U/L (ref 0–40)
ANION GAP SERPL CALCULATED.3IONS-SCNC: 14 MMOL/L (ref 7–16)
ANISOCYTOSIS: ABNORMAL
AST SERPL-CCNC: 22 U/L (ref 0–39)
BACTERIA: NORMAL /HPF
BASOPHILS ABSOLUTE: 0.01 E9/L (ref 0–0.2)
BASOPHILS RELATIVE PERCENT: 0.3 % (ref 0–2)
BILIRUB SERPL-MCNC: 0.2 MG/DL (ref 0–1.2)
BILIRUBIN URINE: NEGATIVE
BLOOD, URINE: ABNORMAL
BUN BLDV-MCNC: 9 MG/DL (ref 6–20)
CALCIUM SERPL-MCNC: 10.2 MG/DL (ref 8.6–10.2)
CHLORIDE BLD-SCNC: 101 MMOL/L (ref 98–107)
CLARITY: CLEAR
CO2: 22 MMOL/L (ref 22–29)
COLOR: YELLOW
CREAT SERPL-MCNC: 0.9 MG/DL (ref 0.7–1.2)
EOSINOPHILS ABSOLUTE: 0.03 E9/L (ref 0.05–0.5)
EOSINOPHILS RELATIVE PERCENT: 0.9 % (ref 0–6)
GFR AFRICAN AMERICAN: >60
GFR NON-AFRICAN AMERICAN: >60 ML/MIN/1.73
GLUCOSE BLD-MCNC: 92 MG/DL (ref 74–99)
GLUCOSE URINE: NEGATIVE MG/DL
HCT VFR BLD CALC: 42 % (ref 37–54)
HEMOGLOBIN: 14.2 G/DL (ref 12.5–16.5)
IMMATURE GRANULOCYTES #: 0.07 E9/L
IMMATURE GRANULOCYTES %: 2.1 % (ref 0–5)
KETONES, URINE: 15 MG/DL
LACTIC ACID, SEPSIS: 1.2 MMOL/L (ref 0.5–1.9)
LEUKOCYTE ESTERASE, URINE: NEGATIVE
LIPASE: 27 U/L (ref 13–60)
LYMPHOCYTES ABSOLUTE: 0.49 E9/L (ref 1.5–4)
LYMPHOCYTES RELATIVE PERCENT: 14.4 % (ref 20–42)
MCH RBC QN AUTO: 32.1 PG (ref 26–35)
MCHC RBC AUTO-ENTMCNC: 33.8 % (ref 32–34.5)
MCV RBC AUTO: 95 FL (ref 80–99.9)
MONOCYTES ABSOLUTE: 0.31 E9/L (ref 0.1–0.95)
MONOCYTES RELATIVE PERCENT: 9.1 % (ref 2–12)
NEUTROPHILS ABSOLUTE: 2.49 E9/L (ref 1.8–7.3)
NEUTROPHILS RELATIVE PERCENT: 73.2 % (ref 43–80)
NITRITE, URINE: NEGATIVE
PDW BLD-RTO: 14.1 FL (ref 11.5–15)
PH UA: 6.5 (ref 5–9)
PLATELET # BLD: 173 E9/L (ref 130–450)
PMV BLD AUTO: 10.1 FL (ref 7–12)
POTASSIUM SERPL-SCNC: 4.3 MMOL/L (ref 3.5–5)
PROTEIN UA: NEGATIVE MG/DL
RBC # BLD: 4.42 E12/L (ref 3.8–5.8)
RBC UA: NORMAL /HPF (ref 0–2)
SODIUM BLD-SCNC: 137 MMOL/L (ref 132–146)
SPECIFIC GRAVITY UA: <=1.005 (ref 1–1.03)
TOTAL PROTEIN: 8.2 G/DL (ref 6.4–8.3)
UROBILINOGEN, URINE: 0.2 E.U./DL
WBC # BLD: 3.4 E9/L (ref 4.5–11.5)
WBC UA: NORMAL /HPF (ref 0–5)

## 2022-09-30 PROCEDURE — 2580000003 HC RX 258: Performed by: RADIOLOGY

## 2022-09-30 PROCEDURE — 99285 EMERGENCY DEPT VISIT HI MDM: CPT

## 2022-09-30 PROCEDURE — 81001 URINALYSIS AUTO W/SCOPE: CPT

## 2022-09-30 PROCEDURE — 83605 ASSAY OF LACTIC ACID: CPT

## 2022-09-30 PROCEDURE — 85025 COMPLETE CBC W/AUTO DIFF WBC: CPT

## 2022-09-30 PROCEDURE — 83690 ASSAY OF LIPASE: CPT

## 2022-09-30 PROCEDURE — 6360000002 HC RX W HCPCS: Performed by: EMERGENCY MEDICINE

## 2022-09-30 PROCEDURE — 2580000003 HC RX 258: Performed by: EMERGENCY MEDICINE

## 2022-09-30 PROCEDURE — 96374 THER/PROPH/DIAG INJ IV PUSH: CPT

## 2022-09-30 PROCEDURE — 6360000004 HC RX CONTRAST MEDICATION: Performed by: RADIOLOGY

## 2022-09-30 PROCEDURE — 36415 COLL VENOUS BLD VENIPUNCTURE: CPT

## 2022-09-30 PROCEDURE — 6370000000 HC RX 637 (ALT 250 FOR IP): Performed by: EMERGENCY MEDICINE

## 2022-09-30 PROCEDURE — 80053 COMPREHEN METABOLIC PANEL: CPT

## 2022-09-30 PROCEDURE — 74177 CT ABD & PELVIS W/CONTRAST: CPT

## 2022-09-30 RX ORDER — KETOROLAC TROMETHAMINE 30 MG/ML
15 INJECTION, SOLUTION INTRAMUSCULAR; INTRAVENOUS ONCE
Status: COMPLETED | OUTPATIENT
Start: 2022-09-30 | End: 2022-09-30

## 2022-09-30 RX ORDER — SODIUM CHLORIDE 0.9 % (FLUSH) 0.9 %
10 SYRINGE (ML) INJECTION
Status: COMPLETED | OUTPATIENT
Start: 2022-09-30 | End: 2022-09-30

## 2022-09-30 RX ORDER — CEFDINIR 300 MG/1
300 CAPSULE ORAL 2 TIMES DAILY
Qty: 20 CAPSULE | Refills: 0 | Status: SHIPPED | OUTPATIENT
Start: 2022-09-30 | End: 2022-10-10

## 2022-09-30 RX ORDER — METRONIDAZOLE 500 MG/1
500 TABLET ORAL 4 TIMES DAILY
Qty: 40 TABLET | Refills: 0 | Status: SHIPPED | OUTPATIENT
Start: 2022-09-30 | End: 2022-10-10

## 2022-09-30 RX ORDER — METRONIDAZOLE 500 MG/1
500 TABLET ORAL ONCE
Status: COMPLETED | OUTPATIENT
Start: 2022-09-30 | End: 2022-09-30

## 2022-09-30 RX ORDER — CEFDINIR 300 MG/1
300 CAPSULE ORAL ONCE
Status: COMPLETED | OUTPATIENT
Start: 2022-09-30 | End: 2022-09-30

## 2022-09-30 RX ORDER — 0.9 % SODIUM CHLORIDE 0.9 %
1000 INTRAVENOUS SOLUTION INTRAVENOUS ONCE
Status: COMPLETED | OUTPATIENT
Start: 2022-09-30 | End: 2022-09-30

## 2022-09-30 RX ADMIN — IOPAMIDOL 75 ML: 755 INJECTION, SOLUTION INTRAVENOUS at 11:59

## 2022-09-30 RX ADMIN — METRONIDAZOLE 500 MG: 500 TABLET ORAL at 13:12

## 2022-09-30 RX ADMIN — SODIUM CHLORIDE 1000 ML: 9 INJECTION, SOLUTION INTRAVENOUS at 13:19

## 2022-09-30 RX ADMIN — KETOROLAC TROMETHAMINE 15 MG: 30 INJECTION, SOLUTION INTRAMUSCULAR; INTRAVENOUS at 13:16

## 2022-09-30 RX ADMIN — CEFDINIR 300 MG: 300 CAPSULE ORAL at 13:12

## 2022-09-30 RX ADMIN — Medication 10 ML: at 11:59

## 2022-09-30 ASSESSMENT — PAIN DESCRIPTION - ORIENTATION
ORIENTATION: MID
ORIENTATION: LOWER

## 2022-09-30 ASSESSMENT — PAIN - FUNCTIONAL ASSESSMENT: PAIN_FUNCTIONAL_ASSESSMENT: 0-10

## 2022-09-30 ASSESSMENT — PAIN DESCRIPTION - PAIN TYPE: TYPE: ACUTE PAIN

## 2022-09-30 ASSESSMENT — PAIN DESCRIPTION - DESCRIPTORS
DESCRIPTORS: SHOOTING
DESCRIPTORS: ACHING

## 2022-09-30 ASSESSMENT — PAIN SCALES - GENERAL: PAINLEVEL_OUTOF10: 4

## 2022-09-30 ASSESSMENT — PAIN DESCRIPTION - LOCATION
LOCATION: ABDOMEN
LOCATION: ABDOMEN

## 2022-09-30 NOTE — ED PROVIDER NOTES
Department of Emergency Medicine   ED  Provider Note  Admit Date/RoomTime: 9/30/2022 11:00 AM  ED Room: [facility]          History of Present Illness:  9/30/22, Time: 3:43 PM EDT  Chief Complaint   Patient presents with    Abdominal Pain     Abdominal pain for 1 day with N/V/D                James Velez is a 20 y.o. male presenting to the ED for abdominal pain. Patient states he has had abdominal for the past day. Came on gradually, nothing makes it better or worse, cramping sensation throughout his abdomen. Also planes of loose watery stools, denies hematemesis hematochezia. Has not had this in the past.  Does have remote history of Hodgkin's lymphoma, he is no longer on chemo or radiation. Denies any c diff risk factors. Denies surgical history otherwise. Denies fever, chills, chest marshals breath, cough, sputum, lethargy, blurred vision, or any other symptoms or complaints. Review of Systems:   Pertinent positives and negatives are stated within HPI, all other systems reviewed and are negative.        --------------------------------------------- PAST HISTORY ---------------------------------------------  Past Medical History:  has a past medical history of Heart murmur, Hodgkin lymphoma ([facility] [address] and Neck mass. Past Surgical History:  has a past surgical history that includes bronchoscopy (N/A, 6/28/2021) and Neck surgery (N/A, 11/26/2021). Social History:  reports that he has been smoking cigarettes. He has a 1.25 pack-year smoking history. He has never used smokeless tobacco. He reports current alcohol use of about 6.0 standard drinks per week. He reports current drug use. Drug: Marijuana [employer]). Family History: family history is not on file. . Unless otherwise noted, family history is non contributory    The patients home medications have been reviewed. Allergies: Patient has no known allergies.         ---------------------------------------------------PHYSICAL EXAM--------------------------------------    Constitutional/General: Alert and oriented x3  Head: Normocephalic and atraumatic  Eyes: PERRL, EOMI, sclera non icteric  Mouth: Oropharynx clear, handling secretions, no trismus, no asymmetry of the posterior oropharynx or uvular edema  Neck: Supple, full ROM, no stridor, no meningeal signs  Respiratory: Lungs clear to auscultation bilaterally, no wheezes, rales, or rhonchi. Not in respiratory distress  Cardiovascular:  Regular rate. Regular rhythm. 2+ distal pulses. Equal extremity pulses. Chest: No chest wall tenderness  GI:  Abdomen Soft, with mild diffuse tenderness, no guarding or rebound   Musculoskeletal: Moves all extremities x 4. Warm and well perfused, no clubbing, cyanosis, or edema. Capillary refill <3 seconds  Integument: skin warm and dry. No rashes. Neurologic: GCS 15, no focal deficits, symmetric strength 5/5 in the upper and lower extremities bilaterally  Psychiatric: Normal Affect          -------------------------------------------------- RESULTS -------------------------------------------------  I have personally reviewed all laboratory and imaging results for this patient. Results are listed below.      LABS: (Lab results interpreted by me)  Results for orders placed or performed during the hospital encounter of 09/30/22   CBC with Auto Differential   Result Value Ref Range    WBC 3.4 (L) 4.5 - 11.5 E9/L    RBC 4.42 3.80 - 5.80 E12/L    Hemoglobin 14.2 12.5 - 16.5 g/dL    Hematocrit 42.0 37.0 - 54.0 %    MCV 95.0 80.0 - 99.9 fL    MCH 32.1 26.0 - 35.0 pg    MCHC 33.8 32.0 - 34.5 %    RDW 14.1 11.5 - 15.0 fL    Platelets 455 756 - 437 E9/L    MPV 10.1 7.0 - 12.0 fL    Neutrophils % 73.2 43.0 - 80.0 %    Immature Granulocytes % 2.1 0.0 - 5.0 %    Lymphocytes % 14.4 (L) 20.0 - 42.0 %    Monocytes % 9.1 2.0 - 12.0 %    Eosinophils % 0.9 0.0 - 6.0 %    Basophils % 0.3 0.0 - 2.0 %    Neutrophils Absolute 2.49 1.80 - 7.30 E9/L    Immature Granulocytes # 0.07 E9/L    Lymphocytes Absolute 0.49 (L) 1.50 - 4.00 E9/L    Monocytes Absolute 0.31 0.10 - 0.95 E9/L    Eosinophils Absolute 0.03 (L) 0.05 - 0.50 E9/L    Basophils Absolute 0.01 0.00 - 0.20 E9/L    Anisocytosis 2+    Comprehensive Metabolic Panel   Result Value Ref Range    Sodium 137 132 - 146 mmol/L    Potassium 4.3 3.5 - 5.0 mmol/L    Chloride 101 98 - 107 mmol/L    CO2 22 22 - 29 mmol/L    Anion Gap 14 7 - 16 mmol/L    Glucose 92 74 - 99 mg/dL    BUN 9 6 - 20 mg/dL    Creatinine 0.9 0.7 - 1.2 mg/dL    GFR Non-African American >60 >=60 mL/min/1.73    GFR African American >60     Calcium 10.2 8.6 - 10.2 mg/dL    Total Protein 8.2 6.4 - 8.3 g/dL    Albumin 5.0 3.5 - 5.2 g/dL    Total Bilirubin 0.2 0.0 - 1.2 mg/dL    Alkaline Phosphatase 120 40 - 129 U/L    ALT 13 0 - 40 U/L    AST 22 0 - 39 U/L   Lactate, Sepsis   Result Value Ref Range    Lactic Acid, Sepsis 1.2 0.5 - 1.9 mmol/L   Urinalysis   Result Value Ref Range    Color, UA Yellow Straw/Yellow    Clarity, UA Clear Clear    Glucose, Ur Negative Negative mg/dL    Bilirubin Urine Negative Negative    Ketones, Urine 15 (A) Negative mg/dL    Specific Gravity, UA <=1.005 1.005 - 1.030    Blood, Urine TRACE-INTACT Negative    pH, UA 6.5 5.0 - 9.0    Protein, UA Negative Negative mg/dL    Urobilinogen, Urine 0.2 <2.0 E.U./dL    Nitrite, Urine Negative Negative    Leukocyte Esterase, Urine Negative Negative   Lipase   Result Value Ref Range    Lipase 27 13 - 60 U/L   Microscopic Urinalysis   Result Value Ref Range    WBC, UA NONE 0 - 5 /HPF    RBC, UA 0-1 0 - 2 /HPF    Bacteria, UA NONE SEEN None Seen /HPF   ,       RADIOLOGY:  Interpreted by Radiologist unless otherwise specified  CT ABDOMEN PELVIS W IV CONTRAST Additional Contrast? None   Final Result   1. Diffuse wall thickening of the colon compatible with pancolitis. No   evidence of intestinal obstruction. No free intraperitoneal air. No   evidence of abscess.       2.  2 areas of low density in the left hepatic lobe superiorly, not   definitely visualized on prior PET-CT of 1/22 or prior abdominal CT of   01/29/2020. Lesions could be artifactual.  Consider follow-up triple phase   CT of the liver. EKG Interpretation  Interpreted by emergency department physician, Dr. Paulette Drew              ------------------------- NURSING NOTES AND VITALS REVIEWED ---------------------------   The nursing notes within the ED encounter and vital signs as below have been reviewed by myself  /71   Pulse 71   Temp 97.7 °F (36.5 °C) (Oral)   Resp 12   Ht 5' 10\" (1.778 m)   Wt 10 lb (4.536 kg)   SpO2 97%   BMI 1.43 kg/m²     Oxygen Saturation Interpretation: Normal    The patients available past medical records and past encounters were reviewed. ------------------------------ ED COURSE/MEDICAL DECISION MAKING----------------------  Medications   iopamidol (ISOVUE-370) 76 % injection 75 mL (75 mLs IntraVENous Given 9/30/22 1159)   sodium chloride flush 0.9 % injection 10 mL (10 mLs IntraVENous Given 9/30/22 1159)   cefdinir (OMNICEF) capsule 300 mg (300 mg Oral Given 9/30/22 1312)   metroNIDAZOLE (FLAGYL) tablet 500 mg (500 mg Oral Given 9/30/22 1312)   0.9 % sodium chloride bolus (0 mLs IntraVENous Stopped 9/30/22 1534)   ketorolac (TORADOL) injection 15 mg (15 mg IntraVENous Given 9/30/22 1316)           The cardiac monitor revealed sinus with a heart rate in the 80s as interpreted by me. The cardiac monitor was ordered secondary to the patient's ab pain and to monitor the patient for dysrhythmia. CPT 34439         Medical Decision Making:    Labs and imaging reviewed. On reevaluation, patient's resting. Tolerating p.o. Pain is completely resolved. Repeat abdominal examination does not indicate a surgical abdomen. Discussed the findings with him, along with the incidental findings. He is comfortable going home. States he will follow-up with his PCP regarding the incidental findings.  Given this, along with his findings, did not feel that further emergent evaluation was indicated. Patient was discharged. Patient is to follow-up with the PCP in 1 to 2 days. Patient is to have a repeat abdominal examination on the emergent basis if new or worsening symptoms arise. Counseling: The emergency provider has spoken with the patient and discussed todays results, in addition to providing specific details for the plan of care and counseling regarding the diagnosis and prognosis. Questions are answered at this time and they are agreeable with the plan.       --------------------------------- IMPRESSION AND DISPOSITION ---------------------------------    IMPRESSION  1. Abdominal pain, unspecified abdominal location    2. Colitis        DISPOSITION  Disposition: Discharge to home  Patient condition is stable        NOTE: This report was transcribed using voice recognition software.  Every effort was made to ensure accuracy; however, inadvertent computerized transcription errors may be present        Khalida Dawkins MD  09/30/22 3220

## 2022-09-30 NOTE — ED NOTES
Department of Emergency Medicine  FIRST PROVIDER TRIAGE NOTE             Independent MLP           9/30/22  10:03 AM EDT    Date of Encounter: 9/30/22   MRN: [id number]      HPI: Cynthia Flood is a 20 y.o. male who presents to the ED for Abdominal Pain (Abdominal pain for 1 day with N/V/D)       ROS: Negative for cp or sob. PE: Gen Appearance/Constitutional: alert  Musculoskeletal: moves all extremities x 4     Initial Plan of Care: All treatment areas with department are currently occupied. Plan to order/Initiate the following while awaiting opening in ED: labs.   Initiate Treatment-Testing, Proceed toTreatment Area When Bed Available for ED Attending/MLP to Continue Care    Electronically signed by DARWIN Villarreal CNP   DD: 9/30/22      DARWIN Zuñiga CNP  09/30/22 1004

## 2022-11-29 ENCOUNTER — HOSPITAL ENCOUNTER (OUTPATIENT)
Dept: NUCLEAR MEDICINE | Age: 22
Discharge: HOME | End: 2022-12-01
Payer: MEDICAID

## 2022-11-29 DIAGNOSIS — D70.9 NEUTROPENIA, UNSPECIFIED TYPE (HCC): ICD-10-CM

## 2022-11-29 PROCEDURE — A9552 F18 FDG: HCPCS | Performed by: RADIOLOGY

## 2022-11-29 PROCEDURE — 3430000000 HC RX DIAGNOSTIC RADIOPHARMACEUTICAL: Performed by: RADIOLOGY

## 2022-11-29 PROCEDURE — 78815 PET IMAGE W/CT SKULL-THIGH: CPT | Performed by: RADIOLOGY

## 2022-11-29 PROCEDURE — 78815 PET IMAGE W/CT SKULL-THIGH: CPT

## 2022-11-29 RX ORDER — FLUDEOXYGLUCOSE F 18 200 MCI/ML
12.8 INJECTION, SOLUTION INTRAVENOUS
Status: COMPLETED | OUTPATIENT
Start: 2022-11-29 | End: 2022-11-29

## 2022-11-29 RX ADMIN — FLUDEOXYGLUCOSE F 18 12.8 MILLICURIE: 200 INJECTION, SOLUTION INTRAVENOUS at 08:11

## 2022-12-30 NOTE — PROGRESS NOTES
[facility] PRE-ADMISSION TESTING INSTRUCTIONS    The Preadmission Testing patient is instructed accordingly using the following criteria (check applicable):    ARRIVAL INSTRUCTIONS:  [x] Parking the day of Surgery is located in the Main Entrance lot. Upon entering the door, make an immediate right to the surgery reception desk    [x] Bring photo ID and insurance card    [] Bring in a copy of Living will or Durable Power of  papers. [x] Please be sure to arrange for responsible adult to provide transportation to and from the hospital    [x] Please arrange for responsible adult to be with you for the 24 hour period post procedure due to having anesthesia    [x] If you awake am of surgery not feeling well or have temperature >100 please call [phone]    GENERAL INSTRUCTIONS:    [x] Nothing by mouth after midnight, including gum, candy, mints or water    [x] You may brush your teeth, but do not swallow any water    [] Take medications as instructed with 1-2 oz of water    [x] Stop herbal supplements and vitamins 5 days prior to procedure    [x] Follow preop dosing of blood thinners per physician instructions    [] Take 1/2 dose of evening insulin, but no insulin after midnight    [] No oral diabetic medications after midnight    [] If diabetic and have low blood sugar or feel symptomatic, take 1-2oz apple juice only    [] Bring inhalers day of surgery    [] Bring C-PAP/ Bi-Pap day of surgery    [] Bring urine specimen day of surgery    [x] Shower or bath with soap, lather and rinse well, AM of Surgery, no lotion, powders or creams to surgical site    [] Follow bowel prep as instructed per surgeon    [x] No tobacco products within 24 hours of surgery     [x] No alcohol or illegal drug use within 24 hours of surgery.     [x] Jewelry, body piercing's, eyeglasses, contact lenses and dentures are not permitted into surgery (bring cases)      [] Please do not wear any nail polish, make up or hair products on the day of surgery    [x] You can expect a call the business day prior to procedure to notify you if your arrival time changes    [x] If you receive a survey after surgery we would greatly appreciate your comments    [] Parent/guardian of a minor must accompany their child and remain on the premises  the entire time they are under our care     [] Pediatric patients may bring favorite toy, blanket or comfort item with them    [] A caregiver or family member must remain with the patient during their stay if they are mentally handicapped, have dementia, disoriented or unable to use a call light or would be a safety concern if left unattended    [x] Please notify surgeon if you develop any illness between now and time of surgery (cold, cough, sore throat, fever, nausea, vomiting) or any signs of infections  including skin, wounds, and dental.    []  The Outpatient Pharmacy is available to fill your prescription here on your day of surgery, ask your preop nurse for details    [x] Other instructions: Wear comfortable clothing    EDUCATIONAL MATERIALS PROVIDED:    [] PAT Preoperative Education Packet/Booklet     [] Medication List    [] Transfusion bracelet applied with instructions    [] Shower with soap, lather and rinse well, and use CHG wipes provided the evening before surgery as instructed    [] Incentive spirometer with instructions

## 2023-01-04 ENCOUNTER — ANESTHESIA EVENT (OUTPATIENT)
Dept: OPERATING ROOM | Age: 23
End: 2023-01-04
Payer: MEDICAID

## 2023-01-04 ASSESSMENT — LIFESTYLE VARIABLES: SMOKING_STATUS: 1

## 2023-01-05 ENCOUNTER — HOSPITAL ENCOUNTER (OUTPATIENT)
Age: 23
Setting detail: OUTPATIENT SURGERY
Discharge: HOME | End: 2023-01-05
Attending: OTOLARYNGOLOGY | Admitting: OTOLARYNGOLOGY
Payer: MEDICAID

## 2023-01-05 ENCOUNTER — ANESTHESIA (OUTPATIENT)
Dept: OPERATING ROOM | Age: 23
End: 2023-01-05
Payer: MEDICAID

## 2023-01-05 VITALS
HEART RATE: 80 BPM | RESPIRATION RATE: 22 BRPM | DIASTOLIC BLOOD PRESSURE: 73 MMHG | SYSTOLIC BLOOD PRESSURE: 119 MMHG | OXYGEN SATURATION: 98 % | WEIGHT: 130 LBS | HEIGHT: 70 IN | BODY MASS INDEX: 18.61 KG/M2 | TEMPERATURE: 97.5 F

## 2023-01-05 DIAGNOSIS — Z90.89 S/P TONSILLECTOMY: ICD-10-CM

## 2023-01-05 DIAGNOSIS — C81.01 NODULAR LYMPHOCYTE PREDOMINANT HODGKIN LYMPHOMA OF LYMPH NODES OF NECK (HCC): Primary | ICD-10-CM

## 2023-01-05 DIAGNOSIS — C81.90 HODGKIN LYMPHOMA, UNSPECIFIED HODGKIN LYMPHOMA TYPE, UNSPECIFIED BODY REGION (HCC): ICD-10-CM

## 2023-01-05 PROCEDURE — 2709999900 HC NON-CHARGEABLE SUPPLY: Performed by: OTOLARYNGOLOGY

## 2023-01-05 PROCEDURE — 7100000001 HC PACU RECOVERY - ADDTL 15 MIN: Performed by: OTOLARYNGOLOGY

## 2023-01-05 PROCEDURE — 6370000000 HC RX 637 (ALT 250 FOR IP)

## 2023-01-05 PROCEDURE — 3600000002 HC SURGERY LEVEL 2 BASE: Performed by: OTOLARYNGOLOGY

## 2023-01-05 PROCEDURE — 2580000003 HC RX 258

## 2023-01-05 PROCEDURE — 7100000011 HC PHASE II RECOVERY - ADDTL 15 MIN: Performed by: OTOLARYNGOLOGY

## 2023-01-05 PROCEDURE — 6370000000 HC RX 637 (ALT 250 FOR IP): Performed by: OTOLARYNGOLOGY

## 2023-01-05 PROCEDURE — 88329 PATH CONSLTJ DRG SURG: CPT

## 2023-01-05 PROCEDURE — 6360000002 HC RX W HCPCS: Performed by: ANESTHESIOLOGY

## 2023-01-05 PROCEDURE — 2580000003 HC RX 258: Performed by: OTOLARYNGOLOGY

## 2023-01-05 PROCEDURE — 3700000001 HC ADD 15 MINUTES (ANESTHESIA): Performed by: OTOLARYNGOLOGY

## 2023-01-05 PROCEDURE — 2720000010 HC SURG SUPPLY STERILE: Performed by: OTOLARYNGOLOGY

## 2023-01-05 PROCEDURE — 88305 TISSUE EXAM BY PATHOLOGIST: CPT

## 2023-01-05 PROCEDURE — 7100000010 HC PHASE II RECOVERY - FIRST 15 MIN: Performed by: OTOLARYNGOLOGY

## 2023-01-05 PROCEDURE — 88304 TISSUE EXAM BY PATHOLOGIST: CPT

## 2023-01-05 PROCEDURE — 7100000000 HC PACU RECOVERY - FIRST 15 MIN: Performed by: OTOLARYNGOLOGY

## 2023-01-05 PROCEDURE — 3600000012 HC SURGERY LEVEL 2 ADDTL 15MIN: Performed by: OTOLARYNGOLOGY

## 2023-01-05 PROCEDURE — 3700000000 HC ANESTHESIA ATTENDED CARE: Performed by: OTOLARYNGOLOGY

## 2023-01-05 PROCEDURE — 6360000002 HC RX W HCPCS

## 2023-01-05 PROCEDURE — 2500000003 HC RX 250 WO HCPCS: Performed by: OTOLARYNGOLOGY

## 2023-01-05 PROCEDURE — 6360000002 HC RX W HCPCS: Performed by: OTOLARYNGOLOGY

## 2023-01-05 PROCEDURE — 2500000003 HC RX 250 WO HCPCS

## 2023-01-05 RX ORDER — LIDOCAINE HYDROCHLORIDE 20 MG/ML
INJECTION, SOLUTION INFILTRATION; PERINEURAL PRN
Status: DISCONTINUED | OUTPATIENT
Start: 2023-01-05 | End: 2023-01-05 | Stop reason: SDUPTHER

## 2023-01-05 RX ORDER — PREDNISOLONE 15 MG/5ML
15 SOLUTION ORAL DAILY
Qty: 25 ML | Refills: 0 | Status: CANCELLED | OUTPATIENT
Start: 2023-01-05 | End: 2023-01-10

## 2023-01-05 RX ORDER — SODIUM CHLORIDE 0.9 % (FLUSH) 0.9 %
5-40 SYRINGE (ML) INJECTION PRN
Status: DISCONTINUED | OUTPATIENT
Start: 2023-01-05 | End: 2023-01-05 | Stop reason: HOSPADM

## 2023-01-05 RX ORDER — FENTANYL CITRATE 50 UG/ML
50 INJECTION, SOLUTION INTRAMUSCULAR; INTRAVENOUS EVERY 5 MIN PRN
Status: DISCONTINUED | OUTPATIENT
Start: 2023-01-05 | End: 2023-01-05 | Stop reason: HOSPADM

## 2023-01-05 RX ORDER — ONDANSETRON 4 MG/1
4 TABLET, ORALLY DISINTEGRATING ORAL EVERY 8 HOURS PRN
Qty: 21 TABLET | Refills: 0 | Status: SHIPPED | OUTPATIENT
Start: 2023-01-05 | End: 2023-01-12

## 2023-01-05 RX ORDER — SODIUM CHLORIDE, SODIUM LACTATE, POTASSIUM CHLORIDE, CALCIUM CHLORIDE 600; 310; 30; 20 MG/100ML; MG/100ML; MG/100ML; MG/100ML
INJECTION, SOLUTION INTRAVENOUS CONTINUOUS
Status: DISCONTINUED | OUTPATIENT
Start: 2023-01-05 | End: 2023-01-05 | Stop reason: HOSPADM

## 2023-01-05 RX ORDER — ONDANSETRON 4 MG/1
4 TABLET, ORALLY DISINTEGRATING ORAL EVERY 8 HOURS PRN
Qty: 21 TABLET | Refills: 0 | Status: SHIPPED | OUTPATIENT
Start: 2023-01-05 | End: 2023-01-05 | Stop reason: SDUPTHER

## 2023-01-05 RX ORDER — PROPOFOL 10 MG/ML
INJECTION, EMULSION INTRAVENOUS PRN
Status: DISCONTINUED | OUTPATIENT
Start: 2023-01-05 | End: 2023-01-05 | Stop reason: SDUPTHER

## 2023-01-05 RX ORDER — SUCCINYLCHOLINE CHLORIDE 20 MG/ML
INJECTION INTRAMUSCULAR; INTRAVENOUS PRN
Status: DISCONTINUED | OUTPATIENT
Start: 2023-01-05 | End: 2023-01-05 | Stop reason: SDUPTHER

## 2023-01-05 RX ORDER — ONDANSETRON 2 MG/ML
4 INJECTION INTRAMUSCULAR; INTRAVENOUS
Status: DISCONTINUED | OUTPATIENT
Start: 2023-01-05 | End: 2023-01-05 | Stop reason: HOSPADM

## 2023-01-05 RX ORDER — SODIUM CHLORIDE 0.9 % (FLUSH) 0.9 %
5-40 SYRINGE (ML) INJECTION EVERY 12 HOURS SCHEDULED
Status: DISCONTINUED | OUTPATIENT
Start: 2023-01-05 | End: 2023-01-05 | Stop reason: HOSPADM

## 2023-01-05 RX ORDER — FENTANYL CITRATE 50 UG/ML
25 INJECTION, SOLUTION INTRAMUSCULAR; INTRAVENOUS EVERY 5 MIN PRN
Status: DISCONTINUED | OUTPATIENT
Start: 2023-01-05 | End: 2023-01-05 | Stop reason: HOSPADM

## 2023-01-05 RX ORDER — LIDOCAINE HYDROCHLORIDE AND EPINEPHRINE 10; 10 MG/ML; UG/ML
INJECTION, SOLUTION INFILTRATION; PERINEURAL PRN
Status: DISCONTINUED | OUTPATIENT
Start: 2023-01-05 | End: 2023-01-05 | Stop reason: ALTCHOICE

## 2023-01-05 RX ORDER — SODIUM CHLORIDE 9 MG/ML
INJECTION, SOLUTION INTRAVENOUS PRN
Status: DISCONTINUED | OUTPATIENT
Start: 2023-01-05 | End: 2023-01-05 | Stop reason: HOSPADM

## 2023-01-05 RX ORDER — ONDANSETRON 2 MG/ML
INJECTION INTRAMUSCULAR; INTRAVENOUS PRN
Status: DISCONTINUED | OUTPATIENT
Start: 2023-01-05 | End: 2023-01-05 | Stop reason: SDUPTHER

## 2023-01-05 RX ORDER — FENTANYL CITRATE 50 UG/ML
INJECTION, SOLUTION INTRAMUSCULAR; INTRAVENOUS PRN
Status: DISCONTINUED | OUTPATIENT
Start: 2023-01-05 | End: 2023-01-05 | Stop reason: SDUPTHER

## 2023-01-05 RX ORDER — SODIUM CHLORIDE 9 MG/ML
INJECTION, SOLUTION INTRAVENOUS CONTINUOUS PRN
Status: DISCONTINUED | OUTPATIENT
Start: 2023-01-05 | End: 2023-01-05 | Stop reason: SDUPTHER

## 2023-01-05 RX ORDER — FERRIC SUBSULFATE 0.21 G/G
LIQUID TOPICAL PRN
Status: DISCONTINUED | OUTPATIENT
Start: 2023-01-05 | End: 2023-01-05 | Stop reason: ALTCHOICE

## 2023-01-05 RX ORDER — MEPERIDINE HYDROCHLORIDE 25 MG/ML
12.5 INJECTION INTRAMUSCULAR; INTRAVENOUS; SUBCUTANEOUS EVERY 4 HOURS PRN
Status: COMPLETED | OUTPATIENT
Start: 2023-01-05 | End: 2023-01-05

## 2023-01-05 RX ORDER — MIDAZOLAM HYDROCHLORIDE 1 MG/ML
INJECTION INTRAMUSCULAR; INTRAVENOUS PRN
Status: DISCONTINUED | OUTPATIENT
Start: 2023-01-05 | End: 2023-01-05 | Stop reason: SDUPTHER

## 2023-01-05 RX ORDER — ROCURONIUM BROMIDE 10 MG/ML
INJECTION, SOLUTION INTRAVENOUS PRN
Status: DISCONTINUED | OUTPATIENT
Start: 2023-01-05 | End: 2023-01-05 | Stop reason: SDUPTHER

## 2023-01-05 RX ORDER — PROPOFOL 10 MG/ML
INJECTION, EMULSION INTRAVENOUS PRN
Status: DISCONTINUED | OUTPATIENT
Start: 2023-01-05 | End: 2023-01-05

## 2023-01-05 RX ADMIN — ROCURONIUM BROMIDE 20 MG: 50 INJECTION INTRAVENOUS at 09:38

## 2023-01-05 RX ADMIN — FENTANYL CITRATE 50 MCG: 50 INJECTION, SOLUTION INTRAMUSCULAR; INTRAVENOUS at 08:52

## 2023-01-05 RX ADMIN — LIDOCAINE HYDROCHLORIDE 100 MG: 20 INJECTION, SOLUTION INFILTRATION; PERINEURAL at 08:52

## 2023-01-05 RX ADMIN — HYDROCODONE BITARTRATE AND ACETAMINOPHEN 5 ML: 7.5; 325 SOLUTION ORAL at 12:57

## 2023-01-05 RX ADMIN — MIDAZOLAM 1 MG: 1 INJECTION INTRAMUSCULAR; INTRAVENOUS at 08:39

## 2023-01-05 RX ADMIN — PROPOFOL 200 MG: 10 INJECTION, EMULSION INTRAVENOUS at 08:52

## 2023-01-05 RX ADMIN — FENTANYL CITRATE 50 MCG: 50 INJECTION, SOLUTION INTRAMUSCULAR; INTRAVENOUS at 09:38

## 2023-01-05 RX ADMIN — SUGAMMADEX 118 MG: 100 INJECTION, SOLUTION INTRAVENOUS at 10:14

## 2023-01-05 RX ADMIN — MEPERIDINE HYDROCHLORIDE 12.5 MG: 25 INJECTION, SOLUTION INTRAMUSCULAR; INTRAVENOUS; SUBCUTANEOUS at 11:09

## 2023-01-05 RX ADMIN — WATER 2000 MG: 1 INJECTION INTRAMUSCULAR; INTRAVENOUS; SUBCUTANEOUS at 08:54

## 2023-01-05 RX ADMIN — MIDAZOLAM 1 MG: 1 INJECTION INTRAMUSCULAR; INTRAVENOUS at 08:43

## 2023-01-05 RX ADMIN — SUCCINYLCHOLINE CHLORIDE 140 MG: 20 INJECTION, SOLUTION INTRAMUSCULAR; INTRAVENOUS at 08:52

## 2023-01-05 RX ADMIN — ONDANSETRON 4 MG: 2 INJECTION INTRAMUSCULAR; INTRAVENOUS at 10:10

## 2023-01-05 RX ADMIN — PROPOFOL 50 MG: 10 INJECTION, EMULSION INTRAVENOUS at 09:38

## 2023-01-05 RX ADMIN — FENTANYL CITRATE 50 MCG: 50 INJECTION, SOLUTION INTRAMUSCULAR; INTRAVENOUS at 10:31

## 2023-01-05 RX ADMIN — SODIUM CHLORIDE: 9 INJECTION, SOLUTION INTRAVENOUS at 08:36

## 2023-01-05 ASSESSMENT — PAIN DESCRIPTION - DESCRIPTORS
DESCRIPTORS: SORE
DESCRIPTORS: DISCOMFORT
DESCRIPTORS: ACHING;SORE;TENDER;DISCOMFORT
DESCRIPTORS: ACHING;DISCOMFORT;DULL;SORE

## 2023-01-05 ASSESSMENT — PAIN SCALES - GENERAL
PAINLEVEL_OUTOF10: 5
PAINLEVEL_OUTOF10: 9
PAINLEVEL_OUTOF10: 2
PAINLEVEL_OUTOF10: 5

## 2023-01-05 ASSESSMENT — PAIN DESCRIPTION - LOCATION
LOCATION: THROAT

## 2023-01-05 ASSESSMENT — PAIN DESCRIPTION - ORIENTATION
ORIENTATION: INNER
ORIENTATION: INNER

## 2023-01-05 ASSESSMENT — LIFESTYLE VARIABLES: SMOKING_STATUS: 1

## 2023-01-05 ASSESSMENT — PAIN - FUNCTIONAL ASSESSMENT
PAIN_FUNCTIONAL_ASSESSMENT: PREVENTS OR INTERFERES SOME ACTIVE ACTIVITIES AND ADLS
PAIN_FUNCTIONAL_ASSESSMENT: 0-10

## 2023-01-05 NOTE — PROGRESS NOTES
INTRAOPERATIVE CONSULTATION (Gross Diagnosis)   A. Submental lymph node, biopsy: Grossly unremarkable node.

## 2023-01-05 NOTE — PROGRESS NOTES
INTRAOPERATIVE CONSULTATION (Gross Diagnosis)  B. Right tonsil, excision: grossly unremarkable on cross-sections. C.  Left tonsil, excision: Grossly unremarkable on cross-sections.

## 2023-01-05 NOTE — ANESTHESIA POSTPROCEDURE EVALUATION
Department of Anesthesiology  Postprocedure Note    Patient: Erasmo Rivera  MRN: [id number]  YOB: 2000  Date of evaluation: 1/5/2023      Procedure Summary     Date: 01/05/23 Room / Location: [address]    Anesthesia Start: 0839 Anesthesia Stop: 1035    Procedures:       TONSILLECTOMY WITH PATHOLOGY (Throat)      OPEN SUBMENTAL BIOPSY WITH NERVE INTEGRITY MONITOR (Neck) Diagnosis:       Hodgkin lymphoma, unspecified Hodgkin lymphoma type, unspecified body region (HCC)      (Hodgkin lymphoma, unspecified Hodgkin lymphoma type, unspecified body region (HCC) [C81.90])    Surgeons: Sky David Jr., MD Responsible Provider: Wilfredo Valles MD    Anesthesia Type: General ASA Status: 3          Anesthesia Type: General    Tiago Phase I: Tiago Score: 10    Tiago Phase II:        Anesthesia Post Evaluation    Patient location during evaluation: PACU  Patient participation: complete - patient participated  Level of consciousness: awake and alert  Pain score: 2  Airway patency: patent  Nausea & Vomiting: no nausea and no vomiting  Complications: no  Cardiovascular status: blood pressure returned to baseline  Respiratory status: acceptable  Hydration status: euvolemic

## 2023-01-05 NOTE — ANESTHESIA PRE PROCEDURE
Department of Anesthesiology  Preprocedure Note       Name:  Eileen Amador   Age:  20 y.o.  :  2000                                          MRN:  [id number]         Date:  2023      Surgeon: Cora Isidro):  Martinez Georges MD    Procedure: Procedure(s):  TONSILLECTOMY WITH PATHOLOGY  POSSIBLE OPEN SUBMENTAL BIOPSY FINE NEEDLE ASPIRATION SUBMENTAL / SUBMANDIBULAR CYTOMETRY   (PATHOLOGY NOTIFIED)    Medications prior to admission:   Prior to Admission medications    Medication Sig Start Date End Date Taking? Authorizing Provider   cetirizine (ZYRTEC) 10 MG tablet take 1 tablet by mouth once daily  Patient not taking: Reported on 2022 10/18/21   Historical Provider, MD   fluticasone (FLONASE) 50 MCG/ACT nasal spray 2 sprays by Nasal route daily  Patient not taking: Reported on 2022   Doe Oviedo DO   azelastine (ASTELIN) 0.1 % nasal spray 2 sprays by Nasal route 2 times daily Use in each nostril as directed  Patient not taking: Reported on 2022 10/22/21   Kurt Hashimoto, DO       Current medications:    Current Outpatient Medications   Medication Sig Dispense Refill    cetirizine (ZYRTEC) 10 MG tablet take 1 tablet by mouth once daily (Patient not taking: Reported on 2022)      fluticasone (FLONASE) 50 MCG/ACT nasal spray 2 sprays by Nasal route daily (Patient not taking: Reported on 2022) 16 g 11    azelastine (ASTELIN) 0.1 % nasal spray 2 sprays by Nasal route 2 times daily Use in each nostril as directed (Patient not taking: Reported on 2022) 120 mL 1     No current facility-administered medications for this visit.        Allergies:  No Known Allergies    Problem List:    Patient Active Problem List   Diagnosis Code    Cavitary lesion of lung J98.4    Pulmonary cavitary lesion J98.4    Hodgkin lymphoma ([address] C81.90    Scrotal lesion N50.9       Past Medical History:        Diagnosis Date    Heart murmur     age 1    Hodgkin lymphoma ([address] 2022 radiation complete; follows with Dr. Georges Love Neck mass        Past Surgical History:        Procedure Laterality Date    BRONCHOSCOPY N/A 6/28/2021    BRONCHOSCOPY ALVEOLAR LAVAGE performed by Veronica Cruz MD at [facility]. [facility] N/A 11/26/2021    OPEN BIOPSY OF SUBMENTAL NECK MASS WITH NERVE INTEGRITY MONITOR performed by Rafi Whitt MD at [address] [place] [address] History:    Social History     Tobacco Use    Smoking status: Some Days     Types: Cigars    Smokeless tobacco: Never    Tobacco comments:     2-3 Black -n- Milds   Substance Use Topics    Alcohol use: Yes     Alcohol/week: 6.0 standard drinks     Types: 6 Cans of beer per week     Comment: occasional                                Ready to quit: Not Answered  Counseling given: Not Answered  Tobacco comments: 2-3 Black -n- Milds      Vital Signs (Current): There were no vitals filed for this visit.                                            BP Readings from Last 3 Encounters:   09/30/22 111/71   06/10/22 103/72   04/28/22 122/78       NPO Status:                                                                                 BMI:   Wt Readings from Last 3 Encounters:   09/30/22 10 lb (4.536 kg)   06/10/22 140 lb (63.5 kg)   04/28/22 145 lb 11.2 oz (66.1 kg)     There is no height or weight on file to calculate BMI.    CBC:   Lab Results   Component Value Date/Time    WBC 3.4 09/30/2022 09:57 AM    RBC 4.42 09/30/2022 09:57 AM    HGB 14.2 09/30/2022 09:57 AM    HCT 42.0 09/30/2022 09:57 AM    MCV 95.0 09/30/2022 09:57 AM    RDW 14.1 09/30/2022 09:57 AM     09/30/2022 09:57 AM       CMP:   Lab Results   Component Value Date/Time     09/30/2022 09:57 AM    K 4.3 09/30/2022 09:57 AM    K 4.1 06/27/2021 09:22 AM     09/30/2022 09:57 AM    CO2 22 09/30/2022 09:57 AM    BUN 9 09/30/2022 09:57 AM    CREATININE 0.9 09/30/2022 09:57 AM    GFRAA >60 09/30/2022 09:57 AM    LABGLOM >60 09/30/2022 09:57 AM    GLUCOSE 92 09/30/2022 09:57 AM    PROT 8.2 09/30/2022 09:57 AM    CALCIUM 10.2 09/30/2022 09:57 AM    BILITOT 0.2 09/30/2022 09:57 AM    ALKPHOS 120 09/30/2022 09:57 AM    AST 22 09/30/2022 09:57 AM    ALT 13 09/30/2022 09:57 AM       POC Tests: No results for input(s): POCGLU, POCNA, POCK, POCCL, POCBUN, POCHEMO, POCHCT in the last 72 hours. Coags: No results found for: PROTIME, INR, APTT    HCG (If Applicable): No results found for: PREGTESTUR, PREGSERUM, HCG, HCGQUANT     ABGs: No results found for: PHART, PO2ART, DBL8YJG, SPO9IDN, BEART, L6RMXOPG     Type & Screen (If Applicable):  No results found for: LABABO, LABRH    Drug/Infectious Status (If Applicable):  No results found for: HIV, HEPCAB    COVID-19 Screening (If Applicable):   Lab Results   Component Value Date/Time    COVID19 Not Detected 11/22/2021 11:15 AM           Anesthesia Evaluation  Patient summary reviewed  Airway: Mallampati: II       Mouth opening: > = 3 FB   Dental:          Pulmonary:   (+) current smoker                           Cardiovascular:Negative CV ROS                      Neuro/Psych:   Negative Neuro/Psych ROS              GI/Hepatic/Renal: Neg GI/Hepatic/Renal ROS            Endo/Other: Negative Endo/Other ROS   (+) malignancy/cancer. ROS comment: Hodgkin Lymphoma s/p radiation treatment Abdominal:             Vascular: Other Findings:             Anesthesia Plan      general     ASA 3       Induction: intravenous.                                 Porter Block MD  January 4, 2023  7:19 PM                    Porter Block MD   1/4/2023

## 2023-01-05 NOTE — ANESTHESIA POSTPROCEDURE EVALUATION
Department of Anesthesiology  Postprocedure Note    Patient: Keith Cole  MRN: [id number]  YOB: 2000  Date of evaluation: 1/5/2023      Procedure Summary     Date: 01/05/23 Room / Location: [facility] 01 / [facility]    Anesthesia Start: 8462 Anesthesia Stop: 3359    Procedures:       TONSILLECTOMY WITH PATHOLOGY (Throat)      OPEN SUBMENTAL BIOPSY WITH NERVE INTEGRITY MONITOR (Neck) Diagnosis:       Hodgkin lymphoma, unspecified Hodgkin lymphoma type, unspecified body region ([address]      (Hodgkin lymphoma, unspecified Hodgkin lymphoma type, unspecified body region ([address] [C81.90])    Surgeons: Dwain Cullen MD Responsible Provider: Gerri Blake MD    Anesthesia Type: General ASA Status: 3          Anesthesia Type: General    Tiago Phase I: Tiago Score: 10    Tiago Phase II:        Anesthesia Post Evaluation    Patient location during evaluation: PACU  Patient participation: complete - patient participated  Level of consciousness: awake and alert  Pain score: 2  Airway patency: patent  Nausea & Vomiting: no nausea and no vomiting  Complications: no  Cardiovascular status: blood pressure returned to baseline  Respiratory status: acceptable  Hydration status: euvolemic

## 2023-01-05 NOTE — DISCHARGE INSTRUCTIONS
!. Patient should stay indoors and quiet for first 5 days. Do not return to work or school   until you have made your post-operative visit at our office on:___________________________________________________________________    2. A prescription for pain medication has been provided. 3. DIET: Avoid potato chips, peanuts, popcorn, and citrus juice. DAY OF OPERATION: Small quantities of water frequently repeated. Also sherbet in small quantity frequently repeated, cracked ice and popsicles. GENERALLY THE SOONER THE PATIENT RETURNS TO A NORMAL DIET,THE BETTER AND QUICKER IS THE HEALING. SECOND DAY: Milk, strained cereal, jello, pudding, beef or chicken broth, tamiko, pop, and popsicles. THIRD & FOURTH DAYS: Soft foods may be added gradually-mashed potatoes, soft cereals, soft boiled eggs, milk toast, etc.    4. Gargles should not be attempted unless recommended. Objectionable odors from the mouth are to be expected for several days. Coughing and hawking or clearing the throat are to be avoided as much as possible since bleeding may be started. Pain in the ears is common and usually comes from the throat. It is worse at night and before meals and in the morning. Frequent chewing of bubble gum or regular gum will help ease the pain. 5. Call the doctor if bleeding from the throat or nose occurs. If no answer call the [facility] LYNN Rodriguez at [phone], or [phone]. If bleeding occurs go to the ER at [address] [facility] or [address] on [facility]. 6. A rise of 1/2 to 1 degree in the child's temperature post-operatively is usually expected in those who do not take adequate amount of liquids, and is caused by mild dehydration rather than infection. 7. DO NOT TAKE ANY ASPIRIN CONTAINING DRUGS. NO IBUPROFEN, MOTRIN, ADVIL, NUPRIN, or ALEVE 2 WEEKS AFTER SURGERY. 8. NO STRENUOUS ACTIVITY FOR 2 WEEKS AFTER SURGERY. 9.No travel from the area for 2 weeks after surgery.

## 2023-01-05 NOTE — OP NOTE
Operative Note      Patient: Hairs No  YOB: 2000  MRN: [id number]    Date of Procedure: 1/5/2023    Pre-Op Diagnosis: Hodgkin lymphoma, unspecified Hodgkin lymphoma type, unspecified body region ([facility] [C81.90]    Post-Op Diagnosis: Same       Procedure(s):  TONSILLECTOMY WITH PATHOLOGY  OPEN SUBMENTAL BIOPSY WITH NERVE INTEGRITY MONITOR    Surgeon(s):  Shanelle Connor MD    Assistant:   Resident: Lisa Padgett DO    Anesthesia: General    Estimated Blood Loss (mL): less than 50     Complications: None    Specimens:   ID Type Source Tests Collected by Time Destination   A : SUBMENTAL LYMPH NODE Tissue Lymph Node SURGICAL PATHOLOGY Shanelle Connor MD 1/5/2023 9436    B : RIGHT TONSIL Tissue Tonsil SURGICAL PATHOLOGY Shanelle Connor MD 1/5/2023 5015    C : LEFT TONSIL Tissue Tonsil SURGICAL PATHOLOGY Shanelle Connor MD 1/5/2023 8941        Implants:  * No implants in log *      Drains: * No LDAs found *    Findings: ~1 cm, well-circumscribed lymph node to the right submandibular region. Detailed Description of Procedure:     Specimens: Right submental lymph node. Right and left tonsils sent separately    Indication: Pt presented with a history of hodgkin lymphoma treated with radiation in 3/2022. Presented for submental lymph node excision and tonsillectomy due to abnormal findings on PET scan. Procedure: Pt was consented preoperatively. Taken back into the OR and identified appropriately. Placed in a supine position and given to anesthesia for general induction. Once properly intubated, NIM monitor leads were placed in the right orbicularis oris and mentalis muscles. Pt was prepped and draped in a sterile fashion. An incision was made with a #15 blade in the right submental region overlying the palpable ~1 cm nodule. Dissection was performed using bovie electrocautery and hemostats with the nerve monitor in place.  Mass was tied off using 3-0 silk suture and sent for permanent pathology. Minimal bleeding was encountered and hemostasis was achieved. Wound was repaired in a subcutaneous fashion with 3-0 chromic suture in simple interrupted fashion followed by 5-0 fast absorbing gut suture in a running fashion. Mastisol and Steri-Strips  were used to dress wound. Attention was then turned to the tonsils. Pt was turned to a 90 deg angle from anesthesia. Pt was prepped and draped in a sterile fashion. A Hopland-Miguel mouth gag was placed into the pt's oral cavity to give exposure to the tonsils. The instrument was suspended from the [place] stand. Starting with the Right, the tonsil was grasped with a curved giovanny forceps and retracted medially to expose the capsule. The bovie electrocautery, was used to dissect the tonsil from the capsule and tonsil bed. Bleeding was controlled with the suction bovie. Minimal bleeding was seen. Once the tonsil was removed, it was given off the table for permanent pathology and was sent separately. Attention was then turned to the Left tonsil, the tonsil was grasped with a curved giovanny forceps and retracted medially to expose the capsule. The bovie electrocautery was used to dissect the tonsil from the capsule and tonsil bed. Bleeding was controlled with the suction bovie. Minimal bleeding was seen. Once the tonsil was removed, it was given off the table for permanent pathology and was sent separately. The tonsillar beds were then treated until hemostasis was achieved. Mirror was used to evaluate the adenoid pad and was found to be atrophic. Monsel's solution was placed in the bilateral tonsillar fossae. Pt's stomach was suctioned out with a Compton Sump, minimal bleeding seen. Pt was turned back to anesthesia for awakening. Pt tolerated procedure well. Dr. Kae Cano was present and scrubbed for the entire procedure.      Electronically signed by Leatha Spicer DO on 1/5/2023 at 10:19 AM      1/5/2023  Sunita Hernandez  [id number]

## 2023-01-05 NOTE — ANESTHESIA POSTPROCEDURE EVALUATION
Department of Anesthesiology  Postprocedure Note    Patient: Agus Rivera  MRN: [id number]  YOB: 2000  Date of evaluation: 1/5/2023      Procedure Summary     Date: 01/05/23 Room / Location: [facility] 01 / [facility]    Anesthesia Start: 5157 Anesthesia Stop: 4859    Procedures:       TONSILLECTOMY WITH PATHOLOGY (Throat)      OPEN SUBMENTAL BIOPSY WITH NERVE INTEGRITY MONITOR (Neck) Diagnosis:       Hodgkin lymphoma, unspecified Hodgkin lymphoma type, unspecified body region ([address]      (Hodgkin lymphoma, unspecified Hodgkin lymphoma type, unspecified body region ([address] [C81.90])    Surgeons: Krishan Nava MD Responsible Provider: Linda Robbins MD    Anesthesia Type: General ASA Status: 3          Anesthesia Type: General    Tiago Phase I: Tiago Score: 10    Tiago Phase II:        Anesthesia Post Evaluation    Patient location during evaluation: PACU  Patient participation: complete - patient participated  Level of consciousness: awake and alert  Pain score: 2  Airway patency: patent  Nausea & Vomiting: no nausea and no vomiting  Complications: no  Cardiovascular status: blood pressure returned to baseline  Respiratory status: acceptable  Hydration status: euvolemic

## 2023-01-05 NOTE — ANESTHESIA PRE PROCEDURE
Department of Anesthesiology  Preprocedure Note       Name:  Sheryl Rendon   Age:  20 y.o.  :  2000                                          MRN:  [id number]         Date:  2023      Surgeon: Rebeka Head):  Delaney Zhang MD    Procedure: Procedure(s):  TONSILLECTOMY WITH PATHOLOGY  POSSIBLE OPEN SUBMENTAL BIOPSY FINE NEEDLE ASPIRATION SUBMENTAL / SUBMANDIBULAR CYTOMETRY   (PATHOLOGY NOTIFIED)    Medications prior to admission:   Prior to Admission medications    Medication Sig Start Date End Date Taking? Authorizing Provider   cetirizine (ZYRTEC) 10 MG tablet take 1 tablet by mouth once daily 10/18/21   Historical Provider, MD   fluticasone (FLONASE) 50 MCG/ACT nasal spray 2 sprays by Nasal route daily  Patient not taking: Reported on 2022   Jessica Oviedo DO   azelastine (ASTELIN) 0.1 % nasal spray 2 sprays by Nasal route 2 times daily Use in each nostril as directed  Patient not taking: Reported on 2022 10/22/21   Santos De La Cruz DO       Current medications:    No current facility-administered medications for this visit. No current outpatient medications on file.      Facility-Administered Medications Ordered in Other Visits   Medication Dose Route Frequency Provider Last Rate Last Admin    lactated ringers infusion   IntraVENous Continuous Delaney Zhang MD        sodium chloride flush 0.9 % injection 5-40 mL  5-40 mL IntraVENous 2 times per day Delaney Zhang MD        sodium chloride flush 0.9 % injection 5-40 mL  5-40 mL IntraVENous PRN Delaney Zhang MD        0.9 % sodium chloride infusion   IntraVENous PRN Delaney Zhang MD        ceFAZolin (ANCEF) 2,000 mg in sterile water 20 mL IV syringe  2,000 mg IntraVENous On Call to [address] [place] [address] Omid [address]., MD           Allergies:  No Known Allergies    Problem List:    Patient Active Problem List   Diagnosis Code    Cavitary lesion of lung J98.4    Pulmonary cavitary lesion J98.4    Hodgkin lymphoma ([facility] C81.90    Scrotal lesion N50.9       Past Medical History:        Diagnosis Date    Heart murmur     age 1    Hodgkin lymphoma ([facility] 03/2022    radiation complete; follows with Dr. Vasquez Shoulder Neck mass        Past Surgical History:        Procedure Laterality Date    BRONCHOSCOPY N/A 6/28/2021    BRONCHOSCOPY ALVEOLAR LAVAGE performed by Annalisa Posadas MD at [facility]. [facility] N/A 11/26/2021    OPEN BIOPSY OF SUBMENTAL NECK MASS WITH NERVE INTEGRITY MONITOR performed by Dominick Villatoro MD at [address] [place] [address] History:    Social History     Tobacco Use    Smoking status: Some Days     Types: Cigars    Smokeless tobacco: Never    Tobacco comments:     2-3 Black -n- Milds   Substance Use Topics    Alcohol use: Yes     Alcohol/week: 6.0 standard drinks     Types: 6 Cans of beer per week     Comment: occasional                                Ready to quit: Not Answered  Counseling given: Not Answered  Tobacco comments: 2-3 Black -rigoberto uLbin      Vital Signs (Current): There were no vitals filed for this visit.                                            BP Readings from Last 3 Encounters:   01/05/23 108/69   09/30/22 111/71   06/10/22 103/72       NPO Status:                                                                                 BMI:   Wt Readings from Last 3 Encounters:   01/05/23 130 lb (59 kg)   09/30/22 10 lb (4.536 kg)   06/10/22 140 lb (63.5 kg)     There is no height or weight on file to calculate BMI.    CBC:   Lab Results   Component Value Date/Time    WBC 3.4 09/30/2022 09:57 AM    RBC 4.42 09/30/2022 09:57 AM    HGB 14.2 09/30/2022 09:57 AM    HCT 42.0 09/30/2022 09:57 AM    MCV 95.0 09/30/2022 09:57 AM    RDW 14.1 09/30/2022 09:57 AM     09/30/2022 09:57 AM       CMP:   Lab Results   Component Value Date/Time     09/30/2022 09:57 AM    K 4.3 09/30/2022 09:57 AM    K 4.1 06/27/2021 09:22 AM     09/30/2022 09:57 AM    CO2 22 09/30/2022 09:57 AM    BUN 9 09/30/2022 09:57 AM    CREATININE 0.9 09/30/2022 09:57 AM    GFRAA >60 09/30/2022 09:57 AM    LABGLOM >60 09/30/2022 09:57 AM    GLUCOSE 92 09/30/2022 09:57 AM    PROT 8.2 09/30/2022 09:57 AM    CALCIUM 10.2 09/30/2022 09:57 AM    BILITOT 0.2 09/30/2022 09:57 AM    ALKPHOS 120 09/30/2022 09:57 AM    AST 22 09/30/2022 09:57 AM    ALT 13 09/30/2022 09:57 AM       POC Tests: No results for input(s): POCGLU, POCNA, POCK, POCCL, POCBUN, POCHEMO, POCHCT in the last 72 hours. Coags: No results found for: PROTIME, INR, APTT    HCG (If Applicable): No results found for: PREGTESTUR, PREGSERUM, HCG, HCGQUANT     ABGs: No results found for: PHART, PO2ART, UKN7MMJ, KEM2BCU, BEART, J5TXJWBD     Type & Screen (If Applicable):  No results found for: LABABO, LABRH    Drug/Infectious Status (If Applicable):  No results found for: HIV, HEPCAB    COVID-19 Screening (If Applicable):   Lab Results   Component Value Date/Time    COVID19 Not Detected 11/22/2021 11:15 AM           Anesthesia Evaluation  Patient summary reviewed  Airway: Mallampati: II  TM distance: >3 FB   Neck ROM: full  Mouth opening: > = 3 FB   Dental: normal exam         Pulmonary:normal exam    (+) current smoker          Patient did not smoke on day of surgery. Cardiovascular:Negative CV ROS                      Neuro/Psych:   Negative Neuro/Psych ROS              GI/Hepatic/Renal: Neg GI/Hepatic/Renal ROS            Endo/Other: Negative Endo/Other ROS   (+) malignancy/cancer. ROS comment: Hodgkin Lymphoma s/p radiation treatment Abdominal:             Vascular: Other Findings:             Anesthesia Plan      general     ASA 3     (Will avoid the administration of steroids unless indicated by the surgical team.)  Induction: intravenous. MIPS: Postoperative opioids intended and Prophylactic antiemetics administered. Anesthetic plan and risks discussed with patient.       Plan discussed with Tara Gloria MD  January 5, 2023  7:59 AM                    Robinson Gamble MD   1/5/2023

## 2023-01-05 NOTE — H&P
Deepak Dc was seen and re-examined preoperatively today, January 5, 2023. There was no substantial change in his physical and medical status. Patient is fit for the proposed surgical procedure. All questions were appropriately addressed and had no further questions regarding the risks, benefits, and alternatives of the procedure. Deepak Dc and family wished to proceed.     Justus Sprague DO  Resident Physician  [facility])  Otolaryngology Residency  1/5/2023  7:25 AM

## 2023-01-06 NOTE — PROGRESS NOTES
CLINICAL PHARMACY NOTE: MEDS TO BEDS    Total # of Prescriptions Filled: 2   The following medications were delivered to the patient:  Zofran 4 odt  Hycodan 7.5 sol    Additional Documentation:

## 2024-03-28 PROBLEM — C85.91: Status: ACTIVE | Noted: 2024-03-28

## 2024-04-15 NOTE — PROGRESS NOTES
eye: No discharge.         Left eye: No discharge.      Conjunctiva/sclera: Conjunctivae normal.   Neck:      Trachea: No tracheal deviation.   Cardiovascular:      Rate and Rhythm: Normal rate and regular rhythm.      Heart sounds: Normal heart sounds.   Pulmonary:      Effort: Pulmonary effort is normal. No respiratory distress.      Breath sounds: Normal breath sounds. No wheezing.   Abdominal:      General: Bowel sounds are normal. There is no distension.      Palpations: Abdomen is soft.      Tenderness: There is no abdominal tenderness.   Genitourinary:     Comments: Warty growths present on scrotum  LAD felt in groin  Musculoskeletal:         General: No tenderness.      Cervical back: Normal range of motion and neck supple.   Skin:     General: Skin is warm and dry.   Neurological:      Mental Status: He is alert.   Psychiatric:         Behavior: Behavior normal.             Assessment and Plan       1. Annual physical exam  Needs labs and screening as below, lost to f/u with hem/onc and rad/onc, thus advised he call them and number of hem/onc provided and message sent, having issues with congestion for a week will treat, warty lesions on scrotum thus will refer to urology  - CBC  - Comprehensive Metabolic Panel  - TSH    2. Lymphoma of lymph nodes of neck, unspecified lymphoma type (HCC)  As above  - CBC  - Comprehensive Metabolic Panel  - TSH    3. Underweight  Recurring issue, will get labs, advised he has no diet restriction  - CBC  - Comprehensive Metabolic Panel  - TSH    4. Acute bacterial sinusitis  As above  - amoxicillin-clavulanate (AUGMENTIN) 875-125 MG per tablet; Take 1 tablet by mouth 2 times daily for 7 days  Dispense: 14 tablet; Refill: 0    5. Scrotal lesion  As above  - AFL - Elvis Mcarthur MD, Urology, [place]        Counseled regarding above diagnosis, including possible risks and complications,  especially if left uncontrolled. Counseled regarding the possible side effects, risks,

## 2024-04-16 ENCOUNTER — OFFICE VISIT (OUTPATIENT)
Dept: PRIMARY CARE CLINIC | Age: 24
End: 2024-04-16
Payer: MEDICAID

## 2024-04-16 VITALS
HEIGHT: 72 IN | DIASTOLIC BLOOD PRESSURE: 72 MMHG | HEART RATE: 76 BPM | RESPIRATION RATE: 17 BRPM | WEIGHT: 139 LBS | BODY MASS INDEX: 18.83 KG/M2 | SYSTOLIC BLOOD PRESSURE: 100 MMHG | OXYGEN SATURATION: 96 % | TEMPERATURE: 97.3 F

## 2024-04-16 DIAGNOSIS — C85.91 LYMPHOMA OF LYMPH NODES OF NECK, UNSPECIFIED LYMPHOMA TYPE (HCC): ICD-10-CM

## 2024-04-16 DIAGNOSIS — J01.90 ACUTE BACTERIAL SINUSITIS: ICD-10-CM

## 2024-04-16 DIAGNOSIS — Z00.00 ANNUAL PHYSICAL EXAM: Primary | ICD-10-CM

## 2024-04-16 DIAGNOSIS — N50.9 SCROTAL LESION: ICD-10-CM

## 2024-04-16 DIAGNOSIS — B96.89 ACUTE BACTERIAL SINUSITIS: ICD-10-CM

## 2024-04-16 DIAGNOSIS — R63.6 UNDERWEIGHT: ICD-10-CM

## 2024-04-16 LAB
ALBUMIN: 4.9 G/DL (ref 3.5–5.2)
ALP BLD-CCNC: 79 U/L (ref 40–129)
ALT SERPL-CCNC: 15 U/L (ref 0–40)
ANION GAP SERPL CALCULATED.3IONS-SCNC: 12 MMOL/L (ref 7–16)
AST SERPL-CCNC: 24 U/L (ref 0–39)
BILIRUB SERPL-MCNC: 0.3 MG/DL (ref 0–1.2)
BUN BLDV-MCNC: 6 MG/DL (ref 6–20)
CALCIUM SERPL-MCNC: 9.6 MG/DL (ref 8.6–10.2)
CHLORIDE BLD-SCNC: 102 MMOL/L (ref 98–107)
CO2: 25 MMOL/L (ref 22–29)
CREAT SERPL-MCNC: 0.8 MG/DL (ref 0.7–1.2)
GFR SERPL CREATININE-BSD FRML MDRD: >90 ML/MIN/1.73M2
GLUCOSE BLD-MCNC: 92 MG/DL (ref 74–99)
HCT VFR BLD CALC: 43.4 % (ref 37–54)
HEMOGLOBIN: 14.7 G/DL (ref 12.5–16.5)
MCH RBC QN AUTO: 32.8 PG (ref 26–35)
MCHC RBC AUTO-ENTMCNC: 33.9 G/DL (ref 32–34.5)
MCV RBC AUTO: 96.9 FL (ref 80–99.9)
PDW BLD-RTO: 15.4 % (ref 11.5–15)
PLATELET # BLD: 184 K/UL (ref 130–450)
PMV BLD AUTO: 10.2 FL (ref 7–12)
POTASSIUM SERPL-SCNC: 4.5 MMOL/L (ref 3.5–5)
RBC # BLD: 4.48 M/UL (ref 3.8–5.8)
SODIUM BLD-SCNC: 139 MMOL/L (ref 132–146)
TOTAL PROTEIN: 8 G/DL (ref 6.4–8.3)
TSH SERPL DL<=0.05 MIU/L-ACNC: 3.11 UIU/ML (ref 0.27–4.2)
WBC # BLD: 1.8 K/UL (ref 4.5–11.5)

## 2024-04-16 PROCEDURE — 99395 PREV VISIT EST AGE 18-39: CPT | Performed by: STUDENT IN AN ORGANIZED HEALTH CARE EDUCATION/TRAINING PROGRAM

## 2024-04-16 RX ORDER — AMOXICILLIN AND CLAVULANATE POTASSIUM 875; 125 MG/1; MG/1
1 TABLET, FILM COATED ORAL 2 TIMES DAILY
Qty: 14 TABLET | Refills: 0 | Status: SHIPPED | OUTPATIENT
Start: 2024-04-16 | End: 2024-04-23

## 2024-04-16 SDOH — ECONOMIC STABILITY: FOOD INSECURITY: WITHIN THE PAST 12 MONTHS, THE FOOD YOU BOUGHT JUST DIDN'T LAST AND YOU DIDN'T HAVE MONEY TO GET MORE.: NEVER TRUE

## 2024-04-16 SDOH — ECONOMIC STABILITY: INCOME INSECURITY: HOW HARD IS IT FOR YOU TO PAY FOR THE VERY BASICS LIKE FOOD, HOUSING, MEDICAL CARE, AND HEATING?: NOT VERY HARD

## 2024-04-16 SDOH — ECONOMIC STABILITY: HOUSING INSECURITY
IN THE LAST 12 MONTHS, WAS THERE A TIME WHEN YOU DID NOT HAVE A STEADY PLACE TO SLEEP OR SLEPT IN A SHELTER (INCLUDING NOW)?: NO

## 2024-04-16 SDOH — ECONOMIC STABILITY: FOOD INSECURITY: WITHIN THE PAST 12 MONTHS, YOU WORRIED THAT YOUR FOOD WOULD RUN OUT BEFORE YOU GOT MONEY TO BUY MORE.: NEVER TRUE

## 2024-04-16 ASSESSMENT — PATIENT HEALTH QUESTIONNAIRE - PHQ9
SUM OF ALL RESPONSES TO PHQ QUESTIONS 1-9: 1
1. LITTLE INTEREST OR PLEASURE IN DOING THINGS: NOT AT ALL
SUM OF ALL RESPONSES TO PHQ9 QUESTIONS 1 & 2: 1
SUM OF ALL RESPONSES TO PHQ QUESTIONS 1-9: 1
SUM OF ALL RESPONSES TO PHQ QUESTIONS 1-9: 1
2. FEELING DOWN, DEPRESSED OR HOPELESS: SEVERAL DAYS
SUM OF ALL RESPONSES TO PHQ QUESTIONS 1-9: 1

## 2024-06-13 NOTE — PROGRESS NOTES
[facility] PRE-ADMISSION TESTING INSTRUCTIONS    The Preadmission Testing patient is instructed accordingly using the following criteria (check applicable):    ARRIVAL INSTRUCTIONS:  [x] Parking the day of Surgery is located in the Main Entrance lot.  Upon entering the door, make an immediate right to the surgery reception desk    [x] Bring photo ID and insurance card    [] Bring in a copy of Living will or Durable Power of  papers.    [x] Please be sure to arrange for responsible adult to provide transportation to and from the hospital    [x] Please arrange for responsible adult to be with you for the 24 hour period post procedure due to having anesthesia    [x] If you awake am of surgery not feeling well or have temperature >100 please call [phone]    GENERAL INSTRUCTIONS:    [x] Nothing by mouth after midnight, including gum, candy, mints or water    [x] You may brush your teeth, but do not swallow any water    [] Take medications as instructed with 1-2 oz of water    [] Stop herbal supplements and vitamins 5 days prior to procedure    [] Follow preop dosing of blood thinners per physician instructions    [] Take 1/2 dose of evening insulin, but no insulin after midnight    [] No oral diabetic medications after midnight    [] If diabetic and have low blood sugar or feel symptomatic, take 1-2oz apple juice only    [] Bring inhalers day of surgery    [] Bring C-PAP/ Bi-Pap day of surgery    [] Bring urine specimen day of surgery    [x] Shower or bath with soap, lather and rinse well, AM of Surgery, no lotion, powders or creams     [] Follow bowel prep as instructed per surgeon    [x] No tobacco products within 24 hours of surgery     [x] No alcohol, Marijuana or illegal drug use within 24 hours of surgery.    [x] Jewelry, body piercing's, eyeglasses, contact lenses and dentures are not permitted into surgery (bring cases)      [] Please do not wear any nail polish, make up or

## 2024-06-16 ENCOUNTER — PREP FOR PROCEDURE (OUTPATIENT)
Dept: UROLOGY | Age: 24
End: 2024-06-16

## 2024-06-16 RX ORDER — SODIUM CHLORIDE 0.9 % (FLUSH) 0.9 %
5-40 SYRINGE (ML) INJECTION EVERY 12 HOURS SCHEDULED
Status: CANCELLED | OUTPATIENT
Start: 2024-06-16

## 2024-06-16 RX ORDER — SODIUM CHLORIDE 9 MG/ML
INJECTION, SOLUTION INTRAVENOUS CONTINUOUS
Status: CANCELLED | OUTPATIENT
Start: 2024-06-16

## 2024-06-16 RX ORDER — SODIUM CHLORIDE 9 MG/ML
INJECTION, SOLUTION INTRAVENOUS PRN
Status: CANCELLED | OUTPATIENT
Start: 2024-06-16

## 2024-06-16 RX ORDER — SODIUM CHLORIDE 0.9 % (FLUSH) 0.9 %
5-40 SYRINGE (ML) INJECTION PRN
Status: CANCELLED | OUTPATIENT
Start: 2024-06-16

## 2024-06-17 ENCOUNTER — ANESTHESIA EVENT (OUTPATIENT)
Dept: OPERATING ROOM | Age: 24
End: 2024-06-17
Payer: MEDICAID

## 2024-06-17 ENCOUNTER — HOSPITAL ENCOUNTER (OUTPATIENT)
Age: 24
Setting detail: OUTPATIENT SURGERY
Discharge: HOME | End: 2024-06-17
Attending: UROLOGY | Admitting: UROLOGY
Payer: MEDICAID

## 2024-06-17 ENCOUNTER — ANESTHESIA (OUTPATIENT)
Dept: OPERATING ROOM | Age: 24
End: 2024-06-17
Payer: MEDICAID

## 2024-06-17 VITALS
RESPIRATION RATE: 16 BRPM | DIASTOLIC BLOOD PRESSURE: 84 MMHG | HEART RATE: 62 BPM | OXYGEN SATURATION: 99 % | SYSTOLIC BLOOD PRESSURE: 132 MMHG | HEIGHT: 72 IN | TEMPERATURE: 97.8 F | BODY MASS INDEX: 20.32 KG/M2 | WEIGHT: 150 LBS

## 2024-06-17 DIAGNOSIS — L72.3 SEBACEOUS CYST: ICD-10-CM

## 2024-06-17 DIAGNOSIS — G89.18 POST-OPERATIVE PAIN: Primary | ICD-10-CM

## 2024-06-17 PROCEDURE — 3700000000 HC ANESTHESIA ATTENDED CARE: Performed by: UROLOGY

## 2024-06-17 PROCEDURE — 6360000002 HC RX W HCPCS: Performed by: NURSE ANESTHETIST, CERTIFIED REGISTERED

## 2024-06-17 PROCEDURE — 7100000000 HC PACU RECOVERY - FIRST 15 MIN: Performed by: UROLOGY

## 2024-06-17 PROCEDURE — 7100000001 HC PACU RECOVERY - ADDTL 15 MIN: Performed by: UROLOGY

## 2024-06-17 PROCEDURE — 6360000002 HC RX W HCPCS: Performed by: NURSE PRACTITIONER

## 2024-06-17 PROCEDURE — 3700000001 HC ADD 15 MINUTES (ANESTHESIA): Performed by: UROLOGY

## 2024-06-17 PROCEDURE — 3600000002 HC SURGERY LEVEL 2 BASE: Performed by: UROLOGY

## 2024-06-17 PROCEDURE — 2500000003 HC RX 250 WO HCPCS: Performed by: NURSE ANESTHETIST, CERTIFIED REGISTERED

## 2024-06-17 PROCEDURE — 2580000003 HC RX 258: Performed by: NURSE PRACTITIONER

## 2024-06-17 PROCEDURE — 7100000010 HC PHASE II RECOVERY - FIRST 15 MIN: Performed by: UROLOGY

## 2024-06-17 PROCEDURE — 88304 TISSUE EXAM BY PATHOLOGIST: CPT

## 2024-06-17 PROCEDURE — 7100000011 HC PHASE II RECOVERY - ADDTL 15 MIN: Performed by: UROLOGY

## 2024-06-17 PROCEDURE — 2709999900 HC NON-CHARGEABLE SUPPLY: Performed by: UROLOGY

## 2024-06-17 PROCEDURE — 6360000002 HC RX W HCPCS: Performed by: UROLOGY

## 2024-06-17 PROCEDURE — 3600000012 HC SURGERY LEVEL 2 ADDTL 15MIN: Performed by: UROLOGY

## 2024-06-17 RX ORDER — SODIUM CHLORIDE 0.9 % (FLUSH) 0.9 %
5-40 SYRINGE (ML) INJECTION EVERY 12 HOURS SCHEDULED
Status: DISCONTINUED | OUTPATIENT
Start: 2024-06-17 | End: 2024-06-17 | Stop reason: HOSPADM

## 2024-06-17 RX ORDER — GLUCAGON 1 MG/ML
1 KIT INJECTION PRN
Status: DISCONTINUED | OUTPATIENT
Start: 2024-06-17 | End: 2024-06-17 | Stop reason: HOSPADM

## 2024-06-17 RX ORDER — DEXAMETHASONE SODIUM PHOSPHATE 4 MG/ML
INJECTION, SOLUTION INTRA-ARTICULAR; INTRALESIONAL; INTRAMUSCULAR; INTRAVENOUS; SOFT TISSUE PRN
Status: DISCONTINUED | OUTPATIENT
Start: 2024-06-17 | End: 2024-06-17 | Stop reason: SDUPTHER

## 2024-06-17 RX ORDER — BUPIVACAINE HYDROCHLORIDE 5 MG/ML
INJECTION, SOLUTION EPIDURAL; INTRACAUDAL PRN
Status: DISCONTINUED | OUTPATIENT
Start: 2024-06-17 | End: 2024-06-17 | Stop reason: ALTCHOICE

## 2024-06-17 RX ORDER — MEPERIDINE HYDROCHLORIDE 25 MG/ML
12.5 INJECTION INTRAMUSCULAR; INTRAVENOUS; SUBCUTANEOUS EVERY 5 MIN PRN
Status: DISCONTINUED | OUTPATIENT
Start: 2024-06-17 | End: 2024-06-17 | Stop reason: HOSPADM

## 2024-06-17 RX ORDER — ONDANSETRON 2 MG/ML
INJECTION INTRAMUSCULAR; INTRAVENOUS PRN
Status: DISCONTINUED | OUTPATIENT
Start: 2024-06-17 | End: 2024-06-17 | Stop reason: SDUPTHER

## 2024-06-17 RX ORDER — SODIUM CHLORIDE 9 MG/ML
INJECTION, SOLUTION INTRAVENOUS PRN
Status: DISCONTINUED | OUTPATIENT
Start: 2024-06-17 | End: 2024-06-17 | Stop reason: HOSPADM

## 2024-06-17 RX ORDER — DEXTROSE MONOHYDRATE 100 MG/ML
INJECTION, SOLUTION INTRAVENOUS CONTINUOUS PRN
Status: DISCONTINUED | OUTPATIENT
Start: 2024-06-17 | End: 2024-06-17 | Stop reason: HOSPADM

## 2024-06-17 RX ORDER — SODIUM CHLORIDE 0.9 % (FLUSH) 0.9 %
5-40 SYRINGE (ML) INJECTION PRN
Status: DISCONTINUED | OUTPATIENT
Start: 2024-06-17 | End: 2024-06-17 | Stop reason: HOSPADM

## 2024-06-17 RX ORDER — FENTANYL CITRATE 50 UG/ML
25 INJECTION, SOLUTION INTRAMUSCULAR; INTRAVENOUS EVERY 5 MIN PRN
Status: DISCONTINUED | OUTPATIENT
Start: 2024-06-17 | End: 2024-06-17 | Stop reason: HOSPADM

## 2024-06-17 RX ORDER — PROCHLORPERAZINE EDISYLATE 5 MG/ML
5 INJECTION INTRAMUSCULAR; INTRAVENOUS
Status: DISCONTINUED | OUTPATIENT
Start: 2024-06-17 | End: 2024-06-17 | Stop reason: HOSPADM

## 2024-06-17 RX ORDER — MIDAZOLAM HYDROCHLORIDE 1 MG/ML
INJECTION INTRAMUSCULAR; INTRAVENOUS PRN
Status: DISCONTINUED | OUTPATIENT
Start: 2024-06-17 | End: 2024-06-17 | Stop reason: SDUPTHER

## 2024-06-17 RX ORDER — NALOXONE HYDROCHLORIDE 0.4 MG/ML
INJECTION, SOLUTION INTRAMUSCULAR; INTRAVENOUS; SUBCUTANEOUS PRN
Status: DISCONTINUED | OUTPATIENT
Start: 2024-06-17 | End: 2024-06-17 | Stop reason: HOSPADM

## 2024-06-17 RX ORDER — DIPHENHYDRAMINE HYDROCHLORIDE 50 MG/ML
INJECTION INTRAMUSCULAR; INTRAVENOUS PRN
Status: DISCONTINUED | OUTPATIENT
Start: 2024-06-17 | End: 2024-06-17 | Stop reason: SDUPTHER

## 2024-06-17 RX ORDER — DIPHENHYDRAMINE HYDROCHLORIDE 50 MG/ML
12.5 INJECTION INTRAMUSCULAR; INTRAVENOUS
Status: DISCONTINUED | OUTPATIENT
Start: 2024-06-17 | End: 2024-06-17 | Stop reason: HOSPADM

## 2024-06-17 RX ORDER — LIDOCAINE HYDROCHLORIDE 20 MG/ML
INJECTION, SOLUTION EPIDURAL; INFILTRATION; INTRACAUDAL; PERINEURAL PRN
Status: DISCONTINUED | OUTPATIENT
Start: 2024-06-17 | End: 2024-06-17 | Stop reason: SDUPTHER

## 2024-06-17 RX ORDER — HYDROCODONE BITARTRATE AND ACETAMINOPHEN 5; 325 MG/1; MG/1
1 TABLET ORAL EVERY 8 HOURS PRN
Qty: 5 TABLET | Refills: 0 | Status: SHIPPED | OUTPATIENT
Start: 2024-06-17 | End: 2024-06-22

## 2024-06-17 RX ORDER — FENTANYL CITRATE 50 UG/ML
INJECTION, SOLUTION INTRAMUSCULAR; INTRAVENOUS PRN
Status: DISCONTINUED | OUTPATIENT
Start: 2024-06-17 | End: 2024-06-17 | Stop reason: SDUPTHER

## 2024-06-17 RX ORDER — PROPOFOL 10 MG/ML
INJECTION, EMULSION INTRAVENOUS PRN
Status: DISCONTINUED | OUTPATIENT
Start: 2024-06-17 | End: 2024-06-17 | Stop reason: SDUPTHER

## 2024-06-17 RX ORDER — ONDANSETRON 2 MG/ML
4 INJECTION INTRAMUSCULAR; INTRAVENOUS
Status: DISCONTINUED | OUTPATIENT
Start: 2024-06-17 | End: 2024-06-17 | Stop reason: HOSPADM

## 2024-06-17 RX ORDER — SODIUM CHLORIDE 9 MG/ML
INJECTION, SOLUTION INTRAVENOUS CONTINUOUS
Status: DISCONTINUED | OUTPATIENT
Start: 2024-06-17 | End: 2024-06-17 | Stop reason: HOSPADM

## 2024-06-17 RX ADMIN — SODIUM CHLORIDE: 9 INJECTION, SOLUTION INTRAVENOUS at 06:00

## 2024-06-17 RX ADMIN — FENTANYL CITRATE 50 MCG: 50 INJECTION, SOLUTION INTRAMUSCULAR; INTRAVENOUS at 07:06

## 2024-06-17 RX ADMIN — ONDANSETRON 4 MG: 2 INJECTION INTRAMUSCULAR; INTRAVENOUS at 07:12

## 2024-06-17 RX ADMIN — PROPOFOL 200 MG: 10 INJECTION, EMULSION INTRAVENOUS at 07:06

## 2024-06-17 RX ADMIN — LIDOCAINE HYDROCHLORIDE 100 MG: 20 INJECTION, SOLUTION EPIDURAL; INFILTRATION; INTRACAUDAL; PERINEURAL at 07:06

## 2024-06-17 RX ADMIN — FENTANYL CITRATE 50 MCG: 50 INJECTION, SOLUTION INTRAMUSCULAR; INTRAVENOUS at 07:30

## 2024-06-17 RX ADMIN — MIDAZOLAM 2 MG: 1 INJECTION INTRAMUSCULAR; INTRAVENOUS at 06:57

## 2024-06-17 RX ADMIN — WATER 2000 MG: 1 INJECTION INTRAMUSCULAR; INTRAVENOUS; SUBCUTANEOUS at 06:54

## 2024-06-17 RX ADMIN — DEXAMETHASONE SODIUM PHOSPHATE 8 MG: 4 INJECTION, SOLUTION INTRAMUSCULAR; INTRAVENOUS at 07:12

## 2024-06-17 RX ADMIN — DIPHENHYDRAMINE HYDROCHLORIDE 12.5 MG: 50 INJECTION, SOLUTION INTRAMUSCULAR; INTRAVENOUS at 06:56

## 2024-06-17 ASSESSMENT — PAIN - FUNCTIONAL ASSESSMENT
PAIN_FUNCTIONAL_ASSESSMENT: 0-10
PAIN_FUNCTIONAL_ASSESSMENT: NONE - DENIES PAIN

## 2024-06-17 NOTE — H&P
EOMI  Neck:  No JVD, No thyromegaly  Cardiac:  RRR  Lungs:  No audible wheezes, symmetric respirations, non-labored  Abdomen: Soft, non-tender, non-distended  Extremities:  No clubbing, edema or cyanosis  Neurological:  Moves all extremities, normal DTR    Lab Results   Component Value Date    WBC 1.8 (L) 04/16/2024    HGB 14.7 04/16/2024    HCT 43.4 04/16/2024    MCV 96.9 04/16/2024     04/16/2024       Lab Results   Component Value Date    CREATININE 0.8 04/16/2024       No results found for: \"PSA\"    No results for input(s): \"LABURIN\" in the last 72 hours.    No results for input(s): \"BC\" in the last 72 hours.    No results for input(s): \"BLOODCULT2\" in the last 72 hours.    Assessment and Plan:  1.  To operating room for sebaceous cyst removal.  I discussed with him that he will have scarring afterwards he will also have recurrence of these.

## 2024-06-17 NOTE — ANESTHESIA PRE PROCEDURE
04/16/2024 11:18 AM    BUN 6 04/16/2024 11:18 AM    CREATININE 0.8 04/16/2024 11:18 AM    GFRAA >60 09/30/2022 09:57 AM    LABGLOM >90 04/16/2024 11:18 AM    GLUCOSE 92 04/16/2024 11:18 AM    CALCIUM 9.6 04/16/2024 11:18 AM    BILITOT 0.3 04/16/2024 11:18 AM    ALKPHOS 79 04/16/2024 11:18 AM    AST 24 04/16/2024 11:18 AM    ALT 15 04/16/2024 11:18 AM       POC Tests: No results for input(s): \"POCGLU\", \"POCNA\", \"POCK\", \"POCCL\", \"POCBUN\", \"POCHEMO\", \"POCHCT\" in the last 72 hours.    Coags: No results found for: \"PROTIME\", \"INR\", \"APTT\"    HCG (If Applicable): No results found for: \"PREGTESTUR\", \"PREGSERUM\", \"HCG\", \"HCGQUANT\"     ABGs: No results found for: \"PHART\", \"PO2ART\", \"PZX2ZZG\", \"HFF7EHY\", \"BEART\", \"[id number]\"     Type & Screen (If Applicable):  No results found for: \"LABABO\"    Drug/Infectious Status (If Applicable):  No results found for: \"HIV\", \"HEPCAB\"    COVID-19 Screening (If Applicable):   Lab Results   Component Value Date/Time    COVID19 Not Detected 11/22/2021 11:15 AM           Anesthesia Evaluation  Patient summary reviewed and Nursing notes reviewed  Airway: Mallampati: II  TM distance: >3 FB   Neck ROM: full  Mouth opening: > = 3 FB   Dental: normal exam         Pulmonary:Negative Pulmonary ROS and normal exam                              ROS comment: Cavitary lesion   Cardiovascular:Negative CV ROS          ECG reviewed               Beta Blocker:  Not on Beta Blocker         Neuro/Psych:   Negative Neuro/Psych ROS              GI/Hepatic/Renal: Neg GI/Hepatic/Renal ROS            Endo/Other:    (+) malignancy/cancer (Hodgkins lymphoma remission).                 Abdominal:             Vascular: negative vascular ROS.         Other Findings:       Anesthesia Plan      general     ASA 3       Induction: intravenous.    MIPS: Postoperative opioids intended and Prophylactic antiemetics administered.  Anesthetic plan and risks discussed with patient.      Plan discussed with IMAN Husain

## 2024-06-17 NOTE — ANESTHESIA POSTPROCEDURE EVALUATION
Department of Anesthesiology  Postprocedure Note    Patient: Erasmo Rivera  MRN: [id number]  YOB: 2000  Date of evaluation: 6/17/2024    Procedure Summary       Date: 06/17/24 Room / Location: [address]    Anesthesia Start: 0654 Anesthesia Stop:     Procedure: EXCISION MULTIPLE SCROTAL SEBACEOUS CYST Diagnosis:       Sebaceous cyst      (Sebaceous cyst [L72.3])    Surgeons: Luis Siddiqui MD Responsible Provider: Rodrigue Flowers DO    Anesthesia Type: general ASA Status: 3            Anesthesia Type: No value filed.    Tiago Phase I: Tiago Score: 10    Tiago Phase II:      Anesthesia Post Evaluation    Patient location during evaluation: PACU  Patient participation: complete - patient participated  Level of consciousness: awake  Airway patency: patent  Nausea & Vomiting: no nausea and no vomiting  Cardiovascular status: hemodynamically stable  Respiratory status: acceptable  Hydration status: euvolemic  Pain management: adequate        No notable events documented.

## 2024-06-17 NOTE — OP NOTE
Operative Note      Patient: Erasmo Rivera  YOB: 2000  MRN: [id number]    Date of Procedure: 6/17/2024    Pre-Op Diagnosis Codes:     * Sebaceous cyst [L72.3], multiple scrotum    Post-Op Diagnosis: Same       Procedure(s):  EXCISION MULTIPLE SCROTAL SEBACEOUS CYSTS    Surgeon(s):  Luis Siddiqui MD    Assistant:   * No surgical staff found *    Anesthesia: General    Estimated Blood Loss (mL): Minimal    Complications: None    Specimens:   ID Type Source Tests Collected by Time Destination   A : SCROTAL SEBACEOUS CYSTS Tissue Tissue SURGICAL PATHOLOGY Luis Siddiqui MD 6/17/2024 0722        Implants:  * No implants in log *      Drains: * No LDAs found *        INDICATION FOR PROCEDURE: Erasmo Rivera is a 23 y.o.  male who presents for excision of scrotal cysts.  We discussed that these will recur and that he have scarring from this procedure today.    PROCEDURE: The patient was brought into the operating room and placed under anesthesia in the supine position.  He was shaven, prepped, and draped in sterile fashion.  Marcaine was injected beneath each of the 10 sebaceous cysts.      An elliptical incision was made around each of these incisions and the sebaceous cyst removed.  The skin was cauterized for hemostasis and each incision was closed with 4-0 chromic suture in a series of horizontal mattresses.  Dermabond was applied to the skin.  The patient was awakened from anesthesia and taken recovery in stable condition.    PLAN:  Erasmo will follow-up with Blayne Umanzor in 2 weeks.    Luis Siddiqui MD  6/17/2024  8:15 AM        Electronically signed by Luis Siddiqui MD on 6/17/2024 at 8:14 AM

## 2024-06-17 NOTE — DISCHARGE INSTRUCTIONS
[facility]  [address] [address]  [phone]  FAX [phone]      Discharge instructions for Dr. Luis Siddiqui     General:   -You will be groggy throughout the day due to the effects of anesthesia.  Have a responsible adult monitor you for the next 24 hours.  -Call if fever or chills  -It is very normal to have burning and pain.  If the pain is severe and out of your control contact Dr. Siddiqui.    Diet: You may eat or drink whatever you like today.  You may experience some nausea.  Call if you have vomiting or inability to tolerate food or drink.    Driving: You may not drive for 24 hours.  You also may not drive if you are taking narcotic pain medications.    Activity: There are no restrictions on your activity.

## 2024-06-21 LAB — SURGICAL PATHOLOGY REPORT: NORMAL

## 2024-08-01 ENCOUNTER — HOSPITAL ENCOUNTER (OUTPATIENT)
Age: 24
Discharge: HOME | End: 2024-08-01
Attending: INTERNAL MEDICINE
Payer: MEDICAID

## 2024-08-01 ENCOUNTER — HOSPITAL ENCOUNTER (OUTPATIENT)
Dept: CT IMAGING | Age: 24
Discharge: HOME | End: 2024-08-03
Attending: INTERNAL MEDICINE
Payer: MEDICAID

## 2024-08-01 DIAGNOSIS — D72.819 LEUKOPENIA, UNSPECIFIED TYPE: ICD-10-CM

## 2024-08-01 DIAGNOSIS — C81.01 NODLR LYMPHOCY PREDOM HDGKN LYMPH, NODES OF HEAD, FACE, & NK (HCC): ICD-10-CM

## 2024-08-01 LAB
BUN SERPL-MCNC: 11 MG/DL (ref 6–20)
CREAT SERPL-MCNC: 0.9 MG/DL (ref 0.7–1.2)
GFR, ESTIMATED: >90 ML/MIN/1.73M2

## 2024-08-01 PROCEDURE — 6360000004 HC RX CONTRAST MEDICATION: Performed by: RADIOLOGY

## 2024-08-01 PROCEDURE — 82565 ASSAY OF CREATININE: CPT

## 2024-08-01 PROCEDURE — 84520 ASSAY OF UREA NITROGEN: CPT

## 2024-08-01 PROCEDURE — 2580000003 HC RX 258: Performed by: RADIOLOGY

## 2024-08-01 PROCEDURE — 70491 CT SOFT TISSUE NECK W/DYE: CPT

## 2024-08-01 PROCEDURE — 36415 COLL VENOUS BLD VENIPUNCTURE: CPT

## 2024-08-01 RX ORDER — SODIUM CHLORIDE 0.9 % (FLUSH) 0.9 %
10 SYRINGE (ML) INJECTION
Status: COMPLETED | OUTPATIENT
Start: 2024-08-01 | End: 2024-08-01

## 2024-08-01 RX ADMIN — IOPAMIDOL 75 ML: 755 INJECTION, SOLUTION INTRAVENOUS at 08:53

## 2024-08-01 RX ADMIN — Medication 10 ML: at 08:53

## 2025-09-03 ENCOUNTER — TELEPHONE (OUTPATIENT)
Dept: PRIMARY CARE CLINIC | Age: 25
End: 2025-09-03

## (undated) DEVICE — DOUBLE BASIN SET: Brand: MEDLINE INDUSTRIES, INC.

## (undated) DEVICE — SKIN PREP TRAY 4 COMPARTM TRAY: Brand: MEDLINE INDUSTRIES, INC.

## (undated) DEVICE — BLADE,STAINLESS-STEEL,11,STRL,DISPOSABLE: Brand: MEDLINE

## (undated) DEVICE — BLADE,STAINLESS-STEEL,15,STRL,DISPOSABLE: Brand: MEDLINE

## (undated) DEVICE — DUAL LUMEN STOMACH TUBE: Brand: SALEM SUMP

## (undated) DEVICE — CATHETER,RED SUCT,POLY-CATH,10: Brand: MEDLINE INDUSTRIES, INC.

## (undated) DEVICE — DRAPE,LAPAROTOMY,PCH,STERILE: Brand: MEDLINE

## (undated) DEVICE — COVER HNDL LT DISP

## (undated) DEVICE — Device: Brand: MEDEX

## (undated) DEVICE — ELECTRODE 8227411 PAIRED 4 CH SET ROHS

## (undated) DEVICE — READY WET SKIN SCRUB TRAY-LF: Brand: MEDLINE INDUSTRIES, INC.

## (undated) DEVICE — SINGLE USE SUCTION VALVE MAJ-209: Brand: SINGLE USE SUCTION VALVE (STERILE)

## (undated) DEVICE — PACK PROCEDURE SURG GEN CUST

## (undated) DEVICE — PROBE 8225101 5PK STD PRASS FL TIP ROHS

## (undated) DEVICE — DECANTER: Brand: UNBRANDED

## (undated) DEVICE — SYRINGE,CONTROL,LL,FINGER,GRIP: Brand: MEDLINE INDUSTRIES, INC.

## (undated) DEVICE — TAPE ADH W1INXL10YD WHT PAPR GENTLE BRTH FLX COMFORTABLE

## (undated) DEVICE — SOLUTION IRRIG 1000ML 0.9% SOD CHL USP POUR PLAS BTL

## (undated) DEVICE — KIT,ANTI FOG,W/SPONGE & FLUID,SOFT PACK: Brand: MEDLINE

## (undated) DEVICE — PENCIL ES CRD L10FT HND SWCHING ROCK SWCH W/ EDGE COAT BLDE

## (undated) DEVICE — BLADE ES ELASTOMERIC COAT INSUL DURABLE BEND UPTO 90DEG

## (undated) DEVICE — GLOVE ORANGE PI 7 1/2   MSG9075

## (undated) DEVICE — SYSTEM SURG HEMSTAT PWD 1 GM POLYSACCHARIDE HEMOSPHERES

## (undated) DEVICE — ELECTRODE PT RET AD L9FT HI MOIST COND ADH HYDRGEL CORDED

## (undated) DEVICE — ELECTRODE ELECSURG NDL 2.8 INX7.2 CM COAT INSUL EDGE

## (undated) DEVICE — SET EXTN IV L30IN TBNG DIA0.1IN PRIMING 4ML MACBOR FEM ADPT

## (undated) DEVICE — SPONGE,TONSIL,DBL STRNG,XRAY,MED,1",STRL: Brand: MEDLINE INDUSTRIES, INC.

## (undated) DEVICE — NEEDLE HYPO 25GA L1.5IN BLU POLYPR HUB S STL REG BVL STR

## (undated) DEVICE — LIQUIBAND RAPID ADHESIVE 36/CS 0.8ML: Brand: MEDLINE

## (undated) DEVICE — GAUZE,SPONGE,4"X4",8PLY,STRL,LF,10/TRAY: Brand: MEDLINE

## (undated) DEVICE — TOWEL,OR,DSP,ST,BLUE,STD,6/PK,12PK/CS: Brand: MEDLINE

## (undated) DEVICE — CLEANER,CAUTERY TIP,2X2",STERILE: Brand: MEDLINE

## (undated) DEVICE — SYRINGE MED 50ML LUERLOCK TIP

## (undated) DEVICE — TUBING SUCT 12FR MAL ALUM SHFT FN CAP VENT UNIV CONN W/ OBT

## (undated) DEVICE — PAD,NON-ADHERENT,2X3,STERILE,LF,1/PK: Brand: MEDLINE

## (undated) DEVICE — SYRINGE,EAR/ULCER, 2 OZ, STERILE: Brand: MEDLINE

## (undated) DEVICE — CONTROL SYRINGE LUER-LOCK TIP: Brand: MONOJECT

## (undated) DEVICE — 4-PORT MANIFOLD: Brand: NEPTUNE 2

## (undated) DEVICE — SINGLE USE BIOPSY VALVE MAJ-210: Brand: SINGLE USE BIOPSY VALVE (STERILE)

## (undated) DEVICE — ADAPTER TBNG DIA15MM SWVL FBROPT BRONCHSCP TERM 2 AXIS PEEP

## (undated) DEVICE — SPONGE LAP W18XL18IN WHT COT 4 PLY FLD STRUNG RADPQ DISP ST 2 PER PACK

## (undated) DEVICE — SURGICAL PROCEDURE PACK EENT CUST

## (undated) DEVICE — INTENDED FOR TISSUE SEPARATION, AND OTHER PROCEDURES THAT REQUIRE A SHARP SURGICAL BLADE TO PUNCTURE OR CUT.: Brand: BARD-PARKER ® STAINLESS STEEL BLADES

## (undated) DEVICE — SOLUTION IV IRRIG WATER 1000ML POUR BRL 2F7114

## (undated) DEVICE — MARKER,SKIN,WI/RULER AND LABELS: Brand: MEDLINE

## (undated) DEVICE — BLADE CLIPPER GEN PURP NS

## (undated) DEVICE — SOLUTION IV IRRIG 500ML 0.9% SODIUM CHL 2F7123

## (undated) DEVICE — STRIP,CLOSURE,WOUND,MEDI-STRIP,1/2X4: Brand: MEDLINE

## (undated) DEVICE — COAGULATOR SUCT 10FR LAIN FTSWCH ACTIVATION DISP VALLEYLAB

## (undated) DEVICE — MAGNETIC INSTR DRAPE 20X16: Brand: MEDLINE INDUSTRIES, INC.

## (undated) DEVICE — BASIC SINGLE BASIN 1-LF: Brand: MEDLINE INDUSTRIES, INC.

## (undated) DEVICE — SOLUTION IV IRRIG POUR BRL 0.9% SODIUM CHL 2F7124

## (undated) DEVICE — Device

## (undated) DEVICE — SURGICAL PROCEDURE PACK BRONCH

## (undated) DEVICE — SET SURG BASIN MAYO REUSABLE